# Patient Record
Sex: FEMALE | NOT HISPANIC OR LATINO | URBAN - METROPOLITAN AREA
[De-identification: names, ages, dates, MRNs, and addresses within clinical notes are randomized per-mention and may not be internally consistent; named-entity substitution may affect disease eponyms.]

---

## 2020-02-12 ENCOUNTER — HOSPITAL ENCOUNTER (OUTPATIENT)
Dept: CARDIOLOGY | Facility: HOSPITAL | Age: 85
Discharge: HOME | End: 2020-02-12

## 2020-03-10 ENCOUNTER — HOSPITAL ENCOUNTER (OUTPATIENT)
Dept: CARDIOLOGY | Facility: HOSPITAL | Age: 85
Discharge: HOME | End: 2020-03-10

## 2020-07-29 ENCOUNTER — HOSPITAL ENCOUNTER (OUTPATIENT)
Dept: WOUND CARE | Facility: HOSPITAL | Age: 85
Setting detail: RECURRING SERIES
Discharge: STILL ADMITTED OR ADMINISTRATIVE | End: 2020-10-27
Attending: INTERNAL MEDICINE

## 2020-08-14 LAB — BACTERIA SPEC AEROBE CULT: NORMAL

## 2023-08-11 ENCOUNTER — APPOINTMENT (OUTPATIENT)
Dept: CT IMAGING | Facility: HOSPITAL | Age: 88
DRG: 683 | End: 2023-08-11
Payer: MEDICARE

## 2023-08-11 ENCOUNTER — APPOINTMENT (OUTPATIENT)
Dept: GENERAL RADIOLOGY | Facility: HOSPITAL | Age: 88
DRG: 683 | End: 2023-08-11
Payer: MEDICARE

## 2023-08-11 ENCOUNTER — HOSPITAL ENCOUNTER (INPATIENT)
Facility: HOSPITAL | Age: 88
LOS: 12 days | Discharge: NURSING, REHAB OR LONG-TERM CARE | DRG: 683 | End: 2023-08-24
Attending: EMERGENCY MEDICINE | Admitting: INTERNAL MEDICINE
Payer: MEDICARE

## 2023-08-11 DIAGNOSIS — I71.40 ABDOMINAL AORTIC ANEURYSM (AAA) WITHOUT RUPTURE, UNSPECIFIED PART: ICD-10-CM

## 2023-08-11 DIAGNOSIS — N17.9 AKI (ACUTE KIDNEY INJURY): ICD-10-CM

## 2023-08-11 DIAGNOSIS — R26.2 DIFFICULTY IN WALKING: ICD-10-CM

## 2023-08-11 DIAGNOSIS — Z78.9 DECREASED ACTIVITIES OF DAILY LIVING (ADL): ICD-10-CM

## 2023-08-11 DIAGNOSIS — E86.0 DEHYDRATION: ICD-10-CM
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
## 2023-08-11 LAB
ALBUMIN SERPL-MCNC: 3.6 G/DL (ref 3.5–5.2)
ALBUMIN/GLOB SERPL: 1.2 G/DL
ALP SERPL-CCNC: 101 U/L (ref 39–117)
ALT SERPL W P-5'-P-CCNC: 21 U/L (ref 1–33)
ANION GAP SERPL CALCULATED.3IONS-SCNC: 12.9 MMOL/L (ref 5–15)
AST SERPL-CCNC: 20 U/L (ref 1–32)
BACTERIA UR QL AUTO: ABNORMAL /HPF
BASOPHILS # BLD AUTO: 0.03 10*3/MM3 (ref 0–0.2)
BASOPHILS NFR BLD AUTO: 0.2 % (ref 0–1.5)
BILIRUB SERPL-MCNC: 0.9 MG/DL (ref 0–1.2)
BILIRUB UR QL STRIP: NEGATIVE
BUN SERPL-MCNC: 59 MG/DL (ref 8–23)
BUN/CREAT SERPL: 26.7 (ref 7–25)
CALCIUM SPEC-SCNC: 9.6 MG/DL (ref 8.2–9.6)
CHLORIDE SERPL-SCNC: 106 MMOL/L (ref 98–107)
CK SERPL-CCNC: 101 U/L (ref 20–180)
CLARITY UR: CLEAR
CO2 SERPL-SCNC: 17.1 MMOL/L (ref 22–29)
COLOR UR: YELLOW
CREAT SERPL-MCNC: 2.21 MG/DL (ref 0.57–1)
DEPRECATED RDW RBC AUTO: 53.8 FL (ref 37–54)
EGFRCR SERPLBLD CKD-EPI 2021: 20.3 ML/MIN/1.73
EOSINOPHIL # BLD AUTO: 0.01 10*3/MM3 (ref 0–0.4)
EOSINOPHIL NFR BLD AUTO: 0.1 % (ref 0.3–6.2)
ERYTHROCYTE [DISTWIDTH] IN BLOOD BY AUTOMATED COUNT: 18 % (ref 12.3–15.4)
GLOBULIN UR ELPH-MCNC: 3 GM/DL
GLUCOSE SERPL-MCNC: 148 MG/DL (ref 65–99)
GLUCOSE UR STRIP-MCNC: NEGATIVE MG/DL
HCT VFR BLD AUTO: 39 % (ref 34–46.6)
HGB BLD-MCNC: 13.2 G/DL (ref 12–15.9)
HGB UR QL STRIP.AUTO: NEGATIVE
HOLD SPECIMEN: NORMAL
HOLD SPECIMEN: NORMAL
HYALINE CASTS UR QL AUTO: ABNORMAL /LPF
IMM GRANULOCYTES # BLD AUTO: 0.11 10*3/MM3 (ref 0–0.05)
IMM GRANULOCYTES NFR BLD AUTO: 0.8 % (ref 0–0.5)
KETONES UR QL STRIP: ABNORMAL
LEUKOCYTE ESTERASE UR QL STRIP.AUTO: NEGATIVE
LYMPHOCYTES # BLD AUTO: 0.89 10*3/MM3 (ref 0.7–3.1)
LYMPHOCYTES NFR BLD AUTO: 6.3 % (ref 19.6–45.3)
MAGNESIUM SERPL-MCNC: 2.7 MG/DL (ref 1.7–2.3)
MCH RBC QN AUTO: 31.1 PG (ref 26.6–33)
MCHC RBC AUTO-ENTMCNC: 33.8 G/DL (ref 31.5–35.7)
MCV RBC AUTO: 91.8 FL (ref 79–97)
MONOCYTES # BLD AUTO: 1.35 10*3/MM3 (ref 0.1–0.9)
MONOCYTES NFR BLD AUTO: 9.5 % (ref 5–12)
NEUTROPHILS NFR BLD AUTO: 11.78 10*3/MM3 (ref 1.7–7)
NEUTROPHILS NFR BLD AUTO: 83.1 % (ref 42.7–76)
NITRITE UR QL STRIP: NEGATIVE
NRBC BLD AUTO-RTO: 0 /100 WBC (ref 0–0.2)
PH UR STRIP.AUTO: 5.5 [PH] (ref 5–8)
PLATELET # BLD AUTO: 238 10*3/MM3 (ref 140–450)
PMV BLD AUTO: 9 FL (ref 6–12)
POTASSIUM SERPL-SCNC: 3.5 MMOL/L (ref 3.5–5.2)
PROT SERPL-MCNC: 6.6 G/DL (ref 6–8.5)
PROT UR QL STRIP: ABNORMAL
RBC # BLD AUTO: 4.25 10*6/MM3 (ref 3.77–5.28)
RBC # UR STRIP: ABNORMAL /HPF
REF LAB TEST METHOD: ABNORMAL
SODIUM SERPL-SCNC: 136 MMOL/L (ref 136–145)
SP GR UR STRIP: 1.02 (ref 1–1.03)
SQUAMOUS #/AREA URNS HPF: ABNORMAL /HPF
TROPONIN T SERPL HS-MCNC: 63 NG/L
UROBILINOGEN UR QL STRIP: ABNORMAL
WBC # UR STRIP: ABNORMAL /HPF
WBC NRBC COR # BLD: 14.17 10*3/MM3 (ref 3.4–10.8)
WHOLE BLOOD HOLD COAG: NORMAL
WHOLE BLOOD HOLD SPECIMEN: NORMAL

## 2023-08-11 PROCEDURE — 84100 ASSAY OF PHOSPHORUS: CPT | Performed by: STUDENT IN AN ORGANIZED HEALTH CARE EDUCATION/TRAINING PROGRAM

## 2023-08-11 PROCEDURE — G0378 HOSPITAL OBSERVATION PER HR: HCPCS

## 2023-08-11 PROCEDURE — 83735 ASSAY OF MAGNESIUM: CPT | Performed by: EMERGENCY MEDICINE

## 2023-08-11 PROCEDURE — 74176 CT ABD & PELVIS W/O CONTRAST: CPT

## 2023-08-11 PROCEDURE — 85025 COMPLETE CBC W/AUTO DIFF WBC: CPT | Performed by: EMERGENCY MEDICINE

## 2023-08-11 PROCEDURE — 72100 X-RAY EXAM L-S SPINE 2/3 VWS: CPT

## 2023-08-11 PROCEDURE — 84439 ASSAY OF FREE THYROXINE: CPT | Performed by: STUDENT IN AN ORGANIZED HEALTH CARE EDUCATION/TRAINING PROGRAM

## 2023-08-11 PROCEDURE — 99223 1ST HOSP IP/OBS HIGH 75: CPT | Performed by: STUDENT IN AN ORGANIZED HEALTH CARE EDUCATION/TRAINING PROGRAM

## 2023-08-11 PROCEDURE — 84443 ASSAY THYROID STIM HORMONE: CPT | Performed by: STUDENT IN AN ORGANIZED HEALTH CARE EDUCATION/TRAINING PROGRAM

## 2023-08-11 PROCEDURE — 70450 CT HEAD/BRAIN W/O DYE: CPT

## 2023-08-11 PROCEDURE — 82550 ASSAY OF CK (CPK): CPT | Performed by: STUDENT IN AN ORGANIZED HEALTH CARE EDUCATION/TRAINING PROGRAM

## 2023-08-11 PROCEDURE — 93005 ELECTROCARDIOGRAM TRACING: CPT | Performed by: EMERGENCY MEDICINE

## 2023-08-11 PROCEDURE — 99285 EMERGENCY DEPT VISIT HI MDM: CPT

## 2023-08-11 PROCEDURE — 93005 ELECTROCARDIOGRAM TRACING: CPT

## 2023-08-11 PROCEDURE — 80053 COMPREHEN METABOLIC PANEL: CPT | Performed by: EMERGENCY MEDICINE

## 2023-08-11 PROCEDURE — 84484 ASSAY OF TROPONIN QUANT: CPT | Performed by: EMERGENCY MEDICINE

## 2023-08-11 PROCEDURE — 71045 X-RAY EXAM CHEST 1 VIEW: CPT

## 2023-08-11 PROCEDURE — 81001 URINALYSIS AUTO W/SCOPE: CPT | Performed by: EMERGENCY MEDICINE

## 2023-08-11 RX ORDER — MONTELUKAST SODIUM 10 MG/1
10 TABLET ORAL NIGHTLY
COMMUNITY
Start: 2023-05-01

## 2023-08-11 RX ORDER — LEVOTHYROXINE SODIUM 75 MCG
75 TABLET ORAL DAILY
COMMUNITY
Start: 2023-05-01

## 2023-08-11 RX ORDER — POTASSIUM CHLORIDE 20 MEQ/1
20 TABLET, EXTENDED RELEASE ORAL DAILY
COMMUNITY
Start: 2023-05-01

## 2023-08-11 RX ORDER — SIMVASTATIN 20 MG
20 TABLET ORAL NIGHTLY
COMMUNITY
Start: 2023-05-01

## 2023-08-11 RX ORDER — SODIUM CHLORIDE 0.9 % (FLUSH) 0.9 %
10 SYRINGE (ML) INJECTION AS NEEDED
Status: DISCONTINUED | OUTPATIENT
Start: 2023-08-11 | End: 2023-08-24 | Stop reason: HOSPADM

## 2023-08-11 RX ORDER — FUROSEMIDE 40 MG/1
40 TABLET ORAL DAILY
COMMUNITY
Start: 2023-05-01

## 2023-08-11 RX ORDER — ATENOLOL 50 MG/1
50 TABLET ORAL DAILY
COMMUNITY
Start: 2023-05-01

## 2023-08-11 RX ADMIN — SODIUM CHLORIDE, POTASSIUM CHLORIDE, SODIUM LACTATE AND CALCIUM CHLORIDE 1000 ML: 600; 310; 30; 20 INJECTION, SOLUTION INTRAVENOUS at 22:31

## 2023-08-11 RX ADMIN — SODIUM CHLORIDE 500 ML: 9 INJECTION, SOLUTION INTRAVENOUS at 18:24

## 2023-08-11 RX ADMIN — Medication 10 ML: at 18:25

## 2023-08-11 RX ADMIN — SODIUM CHLORIDE 500 ML: 9 INJECTION, SOLUTION INTRAVENOUS at 19:50

## 2023-08-11 NOTE — ED PROVIDER NOTES
Time: 6:05 PM EDT  Date of encounter:  8/11/2023  Independent Historian/Clinical History and Information was obtained by:   Patient and friend    History is limited by: N/A    Chief Complaint   Patient presents with    Fall     Patient lived by self and neighbor/friend found her in floor         History of Present Illness:  Patient is a 89 y.o. year old female who presents to the emergency department for evaluation of low back pain.  She was found on the floor by a friend who checks on her periodically.  The patient states that she does not see a doctor and has not seen a doctor in years.  She lives by herself and she has no next of kin.  The patient's previous medical notes from [facility] suggest that she has a history of diabetes and COPD.    [facility]    Patient Care Team  Primary Care Provider: Provider, No Known    Past Medical History:     No Known Allergies  Past Medical History:   Diagnosis Date    Diabetes mellitus     Disease of thyroid gland     Hyperlipidemia     Hypertension     Renal disorder      History reviewed. No pertinent surgical history.  History reviewed. No pertinent family history.    Home Medications:  Prior to Admission medications    Not on File        Social History:   Social History     Tobacco Use    Smoking status: Never    Smokeless tobacco: Never   Vaping Use    Vaping Use: Never used   Substance Use Topics    Alcohol use: Never    Drug use: Never         Review of Systems:  Review of Systems   Constitutional:  Positive for activity change, appetite change and fatigue. Negative for chills and fever.   HENT:  Negative for congestion, ear pain and sore throat.    Eyes:  Negative for pain.   Respiratory:  Negative for cough, chest tightness and shortness of breath.    Cardiovascular:  Negative for chest pain.   Gastrointestinal:  Negative for abdominal pain, diarrhea, nausea and vomiting.   Genitourinary:  Negative for flank pain and hematuria.   Musculoskeletal:  Positive for back  "pain. Negative for joint swelling.   Skin:  Negative for pallor.   Neurological:  Positive for weakness. Negative for seizures and headaches.   Hematological: Negative.    Psychiatric/Behavioral: Negative.     All other systems reviewed and are negative.     Physical Exam:  /80 (BP Location: Right arm, Patient Position: Sitting)   Pulse 86   Temp 97.7 øF (36.5 øC) (Oral)   Resp 16   Ht 165.1 cm (65\")   Wt 75.6 kg (166 lb 10.7 oz)   SpO2 97%   BMI 27.73 kg/mý         Physical Exam  Vitals and nursing note reviewed.   Constitutional:       General: She is not in acute distress.     Appearance: Normal appearance. She is not toxic-appearing.   HENT:      Head: Normocephalic and atraumatic.      Mouth/Throat:      Mouth: Mucous membranes are moist.   Eyes:      General: No scleral icterus.  Cardiovascular:      Rate and Rhythm: Normal rate and regular rhythm.      Pulses: Normal pulses.      Heart sounds: Normal heart sounds.   Pulmonary:      Effort: Pulmonary effort is normal. No respiratory distress.      Breath sounds: Normal breath sounds.   Abdominal:      General: Abdomen is flat.      Palpations: Abdomen is soft.      Tenderness: There is no abdominal tenderness.   Musculoskeletal:         General: Normal range of motion.      Cervical back: Normal range of motion and neck supple.   Skin:     General: Skin is warm and dry.   Neurological:      General: No focal deficit present.      Mental Status: She is alert and oriented to person, place, and time. Mental status is at baseline.      Motor: Weakness present.   Psychiatric:         Mood and Affect: Mood normal.         Behavior: Behavior normal.         Thought Content: Thought content normal.         Judgment: Judgment normal.                    Procedures:  Procedures      Medical Decision Making:      Comorbidities that affect care:    COPD    External Notes reviewed:    Previous Clinic Note: Clinic note for COPD and diabetes.      The following " orders were placed and all results were independently analyzed by me:  Orders Placed This Encounter   Procedures    XR Chest 1 View    CT Head Without Contrast    XR Spine Lumbar 2 or 3 View    CT Abdomen Pelvis Without Contrast    M[place]ou Draw    Comprehensive Metabolic Panel    Single High Sensitivity Troponin T    Magnesium    Urinalysis With Microscopic If Indicated (No Culture) - Urine, Clean Catch    CBC Auto Differential    High Sensitivity Troponin T 2Hr    Urinalysis, Microscopic Only - Urine, Clean Catch    CK    CBC Auto Differential    Comprehensive Metabolic Panel    Magnesium    Phosphorus    Phosphorus    TSH Rfx On Abnormal To Free T4    T4, Free    Basic Metabolic Panel    CBC (No Diff)    Magnesium    Diet: Regular/House Diet; Texture: Regular Texture (IDDSI 7); Fluid Consistency: Thin (IDDSI 0)    Undress & Gown    Continuous Pulse Oximetry    Vital Signs    Orthostatic Blood Pressure    Vital Signs    Intake & Output    Weigh Patient    Oral Care    Saline Lock & Maintain IV Access    Apply Warming Horton    Code Status and Medical Interventions:    IP General Consult (Use specialty-specific consult if known)    Hospitalist (on-call MD unless specified)    Inpatient Vascular Surgery Consult    Inpatient Consult to Advance Care Planning    Inpatient Case Management  Consult    Inpatient Palliative Care Team Consult    OT Consult: Eval & Treat    PT Consult: Eval & Treat Functional Mobility Below Baseline    Oxygen Therapy- Nasal Cannula; Titrate 1-6 LPM Per SpO2; 90 - 95%    POC Glucose 4x Daily Before Meals & at Bedtime    POC Glucose Once    POC Glucose Once    POC Glucose Once    POC Glucose Once    ECG 12 Lead ED Triage Standing Order; Weak / Dizzy / AMS    Consult to Wound / Ostomy Care    Insert Peripheral IV    Insert Peripheral IV    Initiate Observation Status    Inpatient Admission    Transfer Patient    Fall Precautions    Fall Precautions    CBC & Differential    Green  Top (Gel)    Lavender Top    Gold Top - SST    Light Blue Top       Medications Given in the Emergency Department:  Medications   sodium chloride 0.9 % flush 10 mL (10 mL Intravenous Given 8/11/23 1825)   sodium chloride 0.9 % flush 10 mL (10 mL Intravenous Not Given 8/13/23 1032)   sodium chloride 0.9 % flush 10 mL (has no administration in time range)   sodium chloride 0.9 % infusion 40 mL (has no administration in time range)   sennosides-docusate (PERICOLACE) 8.6-50 MG per tablet 2 tablet (2 tablets Oral Not Given 8/13/23 1032)     And   polyethylene glycol (MIRALAX) packet 17 g (has no administration in time range)     And   bisacodyl (DULCOLAX) EC tablet 5 mg (has no administration in time range)     And   bisacodyl (DULCOLAX) suppository 10 mg (has no administration in time range)   lactated ringers infusion (100 mL/hr Intravenous New Bag 8/12/23 0612)   dextrose (GLUTOSE) oral gel 15 g (has no administration in time range)   dextrose (D50W) (25 g/50 mL) IV injection 25 g (has no administration in time range)   glucagon (GLUCAGEN) injection 1 mg (has no administration in time range)   Insulin Lispro (humaLOG) injection 2-7 Units (2 Units Subcutaneous Not Given 8/13/23 1228)   potassium chloride 10 mEq in 100 mL IVPB (10 mEq Intravenous Not Given 8/12/23 0817)   levothyroxine (SYNTHROID, LEVOTHROID) tablet 100 mcg (100 mcg Oral Given 8/13/23 0606)   nystatin (MYCOSTATIN) powder ( Topical Given 8/13/23 0839)   acetaminophen (TYLENOL) tablet 650 mg (650 mg Oral Given 8/13/23 0413)   famotidine (PEPCID) tablet 10 mg (10 mg Oral Given 8/13/23 1040)   sodium chloride 0.9 % bolus 500 mL (0 mL Intravenous Stopped 8/11/23 2229)   sodium chloride 0.9 % bolus 500 mL (0 mL Intravenous Stopped 8/12/23 0027)   lactated ringers bolus 1,000 mL (1,000 mL Intravenous New Bag 8/11/23 2231)   potassium chloride (MICRO-K) CR capsule 40 mEq (40 mEq Oral Given 8/12/23 7761)   potassium chloride 10 mEq in 100 mL IVPB (0 mEq  Intravenous Stopped 8/12/23 1251)   potassium chloride (MICRO-K) CR capsule 40 mEq (40 mEq Oral Given 8/13/23 1040)        ED Course:    The patient was initially evaluated in the triage area where orders were placed. The patient was later dispositioned by Elliot Moya DO.      The patient was advised to stay for completion of workup which includes but is not limited to communication of labs and radiological results, reassessment and plan. The patient was advised that leaving prior to disposition by a provider could result in critical findings that are not communicated to the patient.            EKG: Sinus rhythm with a rate of 83 bpm normal P wave and DC interval  Normal QRS with left axis deviation  Nonspecific ST changes.    Labs:    Lab Results (last 24 hours)       Procedure Component Value Units Date/Time    POC Glucose Once [[id number]]  (Abnormal) Collected: 08/12/23 1659    Specimen: Blood Updated: 08/12/23 1702     Glucose 110 mg/dL      Comment: Serial Number: [id number]:  [id number]       POC Glucose Once [[id number]]  (Abnormal) Collected: 08/12/23 2016    Specimen: Blood Updated: 08/12/23 2017     Glucose 126 mg/dL      Comment: Serial Number: [id number]:  [id number]       POC Glucose Once [[id number]]  (Abnormal) Collected: 08/13/23 0751    Specimen: Blood Updated: 08/13/23 0805     Glucose 103 mg/dL      Comment: Serial Number: [id number]:  [id number]       Basic Metabolic Panel [[id number]]  (Abnormal) Collected: 08/13/23 1101    Specimen: Blood Updated: 08/13/23 1242     Glucose 134 mg/dL      BUN 48 mg/dL      Creatinine 1.82 mg/dL      Sodium 135 mmol/L      Potassium 3.7 mmol/L      Chloride 107 mmol/L      CO2 15.2 mmol/L      Calcium 9.4 mg/dL      BUN/Creatinine Ratio 26.4     Anion Gap 12.8 mmol/L      eGFR 25.7 mL/min/1.73     Narrative:      GFR Normal >60  Chronic Kidney Disease <60  Kidney Failure <15    The GFR formula is only valid for adults with stable renal  function between ages 18 and 70.    CBC (No Diff) [[id number]]  (Abnormal) Collected: 08/13/23 1101    Specimen: Blood Updated: 08/13/23 1115     WBC 17.64 10*3/mm3      RBC 3.71 10*6/mm3      Hemoglobin 11.5 g/dL      Hematocrit 34.6 %      MCV 93.3 fL      MCH 31.0 pg      MCHC 33.2 g/dL      RDW 19.1 %      RDW-SD 55.9 fl      MPV 9.0 fL      Platelets 221 10*3/mm3     Magnesium [[id number]]  (Normal) Collected: 08/13/23 1101    Specimen: Blood Updated: 08/13/23 1242     Magnesium 2.3 mg/dL     POC Glucose Once [[id number]]  (Abnormal) Collected: 08/13/23 1134    Specimen: Blood Updated: 08/13/23 1147     Glucose 105 mg/dL      Comment: Serial Number: [id number]:  [id number]                Imaging:    CT Abdomen Pelvis Without Contrast    Result Date: 8/11/2023  Narrative: PROCEDURE: CT ABDOMEN PELVIS WO CONTRAST  COMPARISON: 8/11/2023.  INDICATIONS: Aneurysm on x-ray with back pain; generalized lower back pain.  TECHNIQUE: 728 CT images were created without intravenous contrast.   PROTOCOL:   Standard CT imaging protocol performed.    RADIATION:   Total DLP: 599.5 mGy*cm.   Automated exposure control was utilized to minimize radiation dose.  FINDINGS: There is a large (7.6 cm) infrarenal abdominal aortic aneurysm, probably fusiform in configuration.  The aneurysm neck is roughly estimated at 3 cm, as on image 106 of series 202.  The fusiform aneurysm extends is about 9.4 cm caudally to just above the aortic bifurcation.  No acute retroperitoneal or intraperitoneal hemorrhage is seen to suggest aneurysm leakage or rupture.  There may be mild fusiform dilatation of the bilateral common iliac arteries.  The left common iliac artery measures about 1.6 cm in greatest diameter.  The right common iliac artery measures about 1.5 cm in greatest diameter.  The proximal left internal iliac artery measures about 1.4 cm in greatest diameter.  The proximal right internal iliac artery measures about 1.1 cm in greatest  diameter.  Extensive atherosclerotic changes are seen.  For instance, extensive arterial calcification involves the proximal bilateral renal arteries.  Minimal calcified plaque is seen at the origins of the celiac trunk and superior mesenteric artery.  The inferior mesenteric artery is not well evaluated by this study; it appears to arise from the lower left anterior margin of the large fusiform aneurysm.   Age-indeterminate vertebral compression fractures are seen at T11 and L1, estimated at 30 percent or less in degree.  Vacuum phenomenon involves the disc interspaces at T11-12, T12-L1, L1-2, L2-3, L3-4, and L4-5.  Severe degenerative changes are seen throughout the imaged spine.  There is suspected levoscoliosis of the lumbar spine.  Chronic malalignment (at several levels) is seen throughout the lumbar spine.   Additionally, colonic diverticulosis is seen without acute diverticulitis.  No acute appendicitis.  No mechanical bowel obstruction.  No pneumoperitoneum or pneumatosis.  The patient has undergone cholecystectomy.  No acute pancreatitis.  Small to moderate sized hiatal hernia is seen.  There are coronary artery calcifications.  No cardiac enlargement.  A trace amount of pericardial effusion is seen.  There may be tiny bilateral pleural effusions.  Motion artifact obscures detail.  Mild prominence of the adrenal glands is seen, which is nonspecific.  No definite hydronephrosis, nephrolithiasis, or ureterolithiasis.  There are arterial calcifications in close proximity to the distal ureters.  It appears that the patient has undergone hysterectomy.  Degenerative changes involve the bilateral hip joints and the bilateral sacroiliac joints.  There is mild distension of the urinary bladder.      Impression:    1. There is a 7.6 cm fusiform aneurysm of the infrarenal abdominal aorta, as discussed.  No acute retroperitoneal hemorrhage is seen to suggest aneurysm leakage or rupture.   2. Age-indeterminate (but  possibly subacute) vertebral compression fractures are seen at T11 and L1, estimated at 30 percent or less in degree.  There is minimal if any posterior wall retropulsion associated with these findings.   3. There are small bilateral pleural effusions.   4. The patient has undergone hysterectomy.   5. Probably no significant acute findings are seen otherwise.   6. Please see above comments for further detail.     A preliminary report (regarding the abdominal aortic aneurysm, AAA) was given to Dr. Moya (ordering/attending [facility] ED Physician) at approximately 2205 hours on 8/11/2023.   Please note that portions of this note were completed with a voice recognition program.  VALERIA FRIEDMAN JR, MD       Electronically Signed and Approved By: VALERIA FRIEDMAN JR, MD on 8/11/2023 at 22:18              XR Spine Lumbar 2 or 3 View    Result Date: 8/11/2023  Narrative: PROCEDURE: XR SPINE LUMBAR 2 OR 3 VW  COMPARISON: [facility], MR, LUMBAR SPINE WITHOUT, 3/02/2010, 17:47.  INDICATIONS: GENERALIZED LOWER BACK PAIN AFTER FALL  FINDINGS:  Mild anterior wedging compression fracture of L1 is of indeterminate age.  This is not evident on 3/2/2010.  Vertebral body heights are otherwise well maintained.  Bony structures have an osteopenic appearance consistent with osteoporosis.  Disc spaces are maintained.  Moderate facet arthropathy is seen at L3-4.  Marked facet arthropathy is seen at L4-5.  A few mm of subluxation of L4 anteriorly over L5 is probably on a degenerative basis, and is unchanged in comparison to 3/2/2010.  Marked facet arthropathy is seen at L5-S1.  Left paravertebral mass measures 11 cm in greatest craniocaudal dimension, and 8 cm in greatest transverse dimension.  Curvilinear calcifications are evident.  This may represent a large abdominal aortic aneurysm, not evident on the prior MR.      Impression:   Lumbar spine series demonstrating mild anterior wedging compression fracture of L1 of  indeterminate age.  Large left paravertebral mass with peripheral curvilinear calcifications may represent an abdominal aortic aneurysm.     HERNANDEZ TILLEY MD       Electronically Signed and Approved By: HERNANDEZ TILLEY MD on 8/11/2023 at 18:53             CT Head Without Contrast    Result Date: 8/11/2023  Narrative: PROCEDURE: CT HEAD WO CONTRAST  COMPARISON:  None  INDICATIONS: HEAD TRAUMA POST FALL X TODAY, LOC  PROTOCOL:   Standard imaging protocol performed    RADIATION:   DLP: 954.9 mGy*cm   MA and/or KV was adjusted to minimize radiation dose.    TECHNIQUE: CT images were obtained without non-ionic intravenous contrast material.  FINDINGS:  The ventricles, sulci, and cerebellar folia are mildly and diffusely prominent consistent with atrophy.  Ill-defined diminished density in cerebral white matter is consistent with mild gliosis and/or scattered old lacunar infarcts.  There is no CT evidence of acute intracranial hemorrhage, mass, or mass effect.  The orbits have a normal appearance.  The paranasal sinuses, middle ears, and mastoid air cells are well aerated.  No fractures are seen on bone window images.      Impression:   CT scan of the head without IV contrast demonstrating mild atrophy and white matter changes.  No acute intracranial abnormality is seen.     HERNANDEZ TILLEY MD       Electronically Signed and Approved By: HERNANDEZ TILLEY MD on 8/11/2023 at 19:35             XR Chest 1 View    Result Date: 8/11/2023  Narrative: PROCEDURE: XR CHEST 1 VW  COMPARISON: [facility], CR, CHEST PA/AP & LAT 2V, 11/06/2010, 21:59.  INDICATIONS: Weak/Dizzy/AMS triage protocol/GENERALIZED WEAKNESS, FALL  FINDINGS:  The heart is normal in size.  The lungs are well-expanded and free of infiltrates.  Healed fracture of the lateral left 6th rib is evident.      Impression:   No active cardiopulmonary disease is seen.       HERNANDEZ TILLEY MD       Electronically Signed and Approved By: HERNANDEZ TILLEY MD on  8/11/2023 at 18:54                No Radiology Exams Resulted Within Past 24 Hours      Differential Diagnosis and Discussion:      Back Pain: The patient presents with back pain. My differential diagnosis includes but is not limited to acute spinal epidural abscess, acute spinal epidural bleed, cauda equina syndrome, abdominal aortic aneurysm, aortic dissection, kidney stone, pyelonephritis, musculoskeletal back pain, spinal fracture, and osteoarthritis.   Weakness: Based on the patient's history, signs, and symptoms, the diffential diagnosis includes but is not limited to meningitis, stroke, sepsis, subarachnoid hemorrhage, intracranial bleeding, encephalitis, acute uti, dehydration, MS, myasthenia gravis, Guillan Elizabeth, migraine variant, neuromuscular disorders vertigo, electrolyte imbalance, and metabolic disorders.    All labs were reviewed and interpreted by me.  EKG was interpreted by me.    MDM     Amount and/or Complexity of Data Reviewed  Tests in the radiology section of CPTr: reviewed  Tests in the medicine section of CPTr: reviewed  Decide to obtain previous medical records or to obtain history from someone other than the patient: yes             Patient Care Considerations:    CT CHEST: I considered ordering a CT scan of the chest, however the patient has no cardiopulmonary symptoms      Consultants/Shared Management Plan:    Hospitalist: I have discussed the case with hospitalist who agrees to accept the patient for admission.    Social Determinants of Health:    Patient is unable to carry out activities of daily life. Escalation of care is necessary.       Disposition and Care Coordination:    Admit:   Through independent evaluation of the patient's history, physical, and imperical data, the patient meets criteria for observation/admission to the hospital.        Final diagnoses:   Fall, initial encounter   Dehydration   HANNAH (acute kidney injury)   Abdominal aortic aneurysm (AAA) without rupture,  unspecified part        ED Disposition       ED Disposition   Decision to Admit    Condition   --    Comment   Level of Care: Remote Telemetry [26]   Diagnosis: HANNAH (acute kidney injury) [[id number]]                 This medical record created using voice recognition software.             Elliot Moya, DO  08/13/23 [id number]

## 2023-08-12 PROBLEM — T68.XXXA HYPOTHERMIA: Status: ACTIVE | Noted: 2023-08-12

## 2023-08-12 LAB
ALBUMIN SERPL-MCNC: 3.3 G/DL (ref 3.5–5.2)
ALBUMIN/GLOB SERPL: 1.3 G/DL
ALP SERPL-CCNC: 94 U/L (ref 39–117)
ALT SERPL W P-5'-P-CCNC: 18 U/L (ref 1–33)
ANION GAP SERPL CALCULATED.3IONS-SCNC: 13.8 MMOL/L (ref 5–15)
AST SERPL-CCNC: 13 U/L (ref 1–32)
BASOPHILS # BLD AUTO: 0.02 10*3/MM3 (ref 0–0.2)
BASOPHILS NFR BLD AUTO: 0.1 % (ref 0–1.5)
BILIRUB SERPL-MCNC: 1.1 MG/DL (ref 0–1.2)
BUN SERPL-MCNC: 54 MG/DL (ref 8–23)
BUN/CREAT SERPL: 26.1 (ref 7–25)
CALCIUM SPEC-SCNC: 9.5 MG/DL (ref 8.2–9.6)
CHLORIDE SERPL-SCNC: 108 MMOL/L (ref 98–107)
CO2 SERPL-SCNC: 17.2 MMOL/L (ref 22–29)
CREAT SERPL-MCNC: 2.07 MG/DL (ref 0.57–1)
DEPRECATED RDW RBC AUTO: 56.1 FL (ref 37–54)
EGFRCR SERPLBLD CKD-EPI 2021: 22 ML/MIN/1.73
EOSINOPHIL # BLD AUTO: 0.05 10*3/MM3 (ref 0–0.4)
EOSINOPHIL NFR BLD AUTO: 0.3 % (ref 0.3–6.2)
ERYTHROCYTE [DISTWIDTH] IN BLOOD BY AUTOMATED COUNT: 18.5 % (ref 12.3–15.4)
GEN 5 2HR TROPONIN T REFLEX: 62 NG/L
GLOBULIN UR ELPH-MCNC: 2.6 GM/DL
GLUCOSE BLDC GLUCOMTR-MCNC: 110 MG/DL (ref 70–99)
GLUCOSE BLDC GLUCOMTR-MCNC: 126 MG/DL (ref 70–99)
GLUCOSE SERPL-MCNC: 128 MG/DL (ref 65–99)
HCT VFR BLD AUTO: 37.6 % (ref 34–46.6)
HGB BLD-MCNC: 12.7 G/DL (ref 12–15.9)
IMM GRANULOCYTES # BLD AUTO: 0.07 10*3/MM3 (ref 0–0.05)
IMM GRANULOCYTES NFR BLD AUTO: 0.5 % (ref 0–0.5)
LYMPHOCYTES # BLD AUTO: 1.23 10*3/MM3 (ref 0.7–3.1)
LYMPHOCYTES NFR BLD AUTO: 8.2 % (ref 19.6–45.3)
MAGNESIUM SERPL-MCNC: 2.6 MG/DL (ref 1.7–2.3)
MCH RBC QN AUTO: 31.4 PG (ref 26.6–33)
MCHC RBC AUTO-ENTMCNC: 33.8 G/DL (ref 31.5–35.7)
MCV RBC AUTO: 92.8 FL (ref 79–97)
MONOCYTES # BLD AUTO: 1.48 10*3/MM3 (ref 0.1–0.9)
MONOCYTES NFR BLD AUTO: 9.9 % (ref 5–12)
NEUTROPHILS NFR BLD AUTO: 12.14 10*3/MM3 (ref 1.7–7)
NEUTROPHILS NFR BLD AUTO: 81 % (ref 42.7–76)
NRBC BLD AUTO-RTO: 0 /100 WBC (ref 0–0.2)
PHOSPHATE SERPL-MCNC: 2.9 MG/DL (ref 2.5–4.5)
PHOSPHATE SERPL-MCNC: 3 MG/DL (ref 2.5–4.5)
PLATELET # BLD AUTO: 225 10*3/MM3 (ref 140–450)
PMV BLD AUTO: 8.6 FL (ref 6–12)
POTASSIUM SERPL-SCNC: 2.7 MMOL/L (ref 3.5–5.2)
PROT SERPL-MCNC: 5.9 G/DL (ref 6–8.5)
RBC # BLD AUTO: 4.05 10*6/MM3 (ref 3.77–5.28)
SODIUM SERPL-SCNC: 139 MMOL/L (ref 136–145)
T4 FREE SERPL-MCNC: 0.49 NG/DL (ref 0.93–1.7)
TROPONIN T DELTA: -1 NG/L
TSH SERPL DL<=0.05 MIU/L-ACNC: 13.33 UIU/ML (ref 0.27–4.2)
WBC NRBC COR # BLD: 14.99 10*3/MM3 (ref 3.4–10.8)

## 2023-08-12 PROCEDURE — 36415 COLL VENOUS BLD VENIPUNCTURE: CPT | Performed by: STUDENT IN AN ORGANIZED HEALTH CARE EDUCATION/TRAINING PROGRAM

## 2023-08-12 PROCEDURE — 99222 1ST HOSP IP/OBS MODERATE 55: CPT | Performed by: SURGERY

## 2023-08-12 PROCEDURE — 84100 ASSAY OF PHOSPHORUS: CPT | Performed by: STUDENT IN AN ORGANIZED HEALTH CARE EDUCATION/TRAINING PROGRAM

## 2023-08-12 PROCEDURE — 82948 REAGENT STRIP/BLOOD GLUCOSE: CPT

## 2023-08-12 PROCEDURE — 80053 COMPREHEN METABOLIC PANEL: CPT | Performed by: STUDENT IN AN ORGANIZED HEALTH CARE EDUCATION/TRAINING PROGRAM

## 2023-08-12 PROCEDURE — 85025 COMPLETE CBC W/AUTO DIFF WBC: CPT | Performed by: STUDENT IN AN ORGANIZED HEALTH CARE EDUCATION/TRAINING PROGRAM

## 2023-08-12 PROCEDURE — 83735 ASSAY OF MAGNESIUM: CPT | Performed by: STUDENT IN AN ORGANIZED HEALTH CARE EDUCATION/TRAINING PROGRAM

## 2023-08-12 PROCEDURE — 99232 SBSQ HOSP IP/OBS MODERATE 35: CPT | Performed by: INTERNAL MEDICINE

## 2023-08-12 PROCEDURE — 0 POTASSIUM CHLORIDE 10 MEQ/100ML SOLUTION: Performed by: PHYSICIAN ASSISTANT

## 2023-08-12 PROCEDURE — 84484 ASSAY OF TROPONIN QUANT: CPT | Performed by: STUDENT IN AN ORGANIZED HEALTH CARE EDUCATION/TRAINING PROGRAM

## 2023-08-12 RX ORDER — SODIUM CHLORIDE 9 MG/ML
40 INJECTION, SOLUTION INTRAVENOUS AS NEEDED
Status: DISCONTINUED | OUTPATIENT
Start: 2023-08-12 | End: 2023-08-24 | Stop reason: HOSPADM

## 2023-08-12 RX ORDER — BISACODYL 5 MG/1
5 TABLET, DELAYED RELEASE ORAL DAILY PRN
Status: DISCONTINUED | OUTPATIENT
Start: 2023-08-12 | End: 2023-08-24 | Stop reason: HOSPADM

## 2023-08-12 RX ORDER — NYSTATIN 100000 [USP'U]/G
POWDER TOPICAL EVERY 12 HOURS SCHEDULED
Status: DISCONTINUED | OUTPATIENT
Start: 2023-08-12 | End: 2023-08-16

## 2023-08-12 RX ORDER — LEVOTHYROXINE SODIUM 0.1 MG/1
100 TABLET ORAL
Status: DISCONTINUED | OUTPATIENT
Start: 2023-08-13 | End: 2023-08-24 | Stop reason: HOSPADM

## 2023-08-12 RX ORDER — POLYETHYLENE GLYCOL 3350 17 G/17G
17 POWDER, FOR SOLUTION ORAL DAILY PRN
Status: DISCONTINUED | OUTPATIENT
Start: 2023-08-12 | End: 2023-08-24 | Stop reason: HOSPADM

## 2023-08-12 RX ORDER — DEXTROSE MONOHYDRATE 25 G/50ML
25 INJECTION, SOLUTION INTRAVENOUS
Status: DISCONTINUED | OUTPATIENT
Start: 2023-08-12 | End: 2023-08-24 | Stop reason: HOSPADM

## 2023-08-12 RX ORDER — SODIUM CHLORIDE, SODIUM LACTATE, POTASSIUM CHLORIDE, CALCIUM CHLORIDE 600; 310; 30; 20 MG/100ML; MG/100ML; MG/100ML; MG/100ML
100 INJECTION, SOLUTION INTRAVENOUS CONTINUOUS
Status: ACTIVE | OUTPATIENT
Start: 2023-08-12 | End: 2023-08-12

## 2023-08-12 RX ORDER — INSULIN LISPRO 100 [IU]/ML
2-7 INJECTION, SOLUTION INTRAVENOUS; SUBCUTANEOUS
Status: DISCONTINUED | OUTPATIENT
Start: 2023-08-12 | End: 2023-08-24 | Stop reason: HOSPADM

## 2023-08-12 RX ORDER — NICOTINE POLACRILEX 4 MG
15 LOZENGE BUCCAL
Status: DISCONTINUED | OUTPATIENT
Start: 2023-08-12 | End: 2023-08-24 | Stop reason: HOSPADM

## 2023-08-12 RX ORDER — ACETAMINOPHEN 325 MG/1
650 TABLET ORAL EVERY 6 HOURS PRN
Status: DISCONTINUED | OUTPATIENT
Start: 2023-08-12 | End: 2023-08-24 | Stop reason: HOSPADM

## 2023-08-12 RX ORDER — SODIUM CHLORIDE 0.9 % (FLUSH) 0.9 %
10 SYRINGE (ML) INJECTION EVERY 12 HOURS SCHEDULED
Status: DISCONTINUED | OUTPATIENT
Start: 2023-08-12 | End: 2023-08-24 | Stop reason: HOSPADM

## 2023-08-12 RX ORDER — POTASSIUM CHLORIDE 7.45 MG/ML
10 INJECTION INTRAVENOUS
Status: COMPLETED | OUTPATIENT
Start: 2023-08-12 | End: 2023-08-12

## 2023-08-12 RX ORDER — POTASSIUM CHLORIDE 750 MG/1
40 CAPSULE, EXTENDED RELEASE ORAL ONCE
Status: COMPLETED | OUTPATIENT
Start: 2023-08-12 | End: 2023-08-12

## 2023-08-12 RX ORDER — POTASSIUM CHLORIDE 7.45 MG/ML
10 INJECTION INTRAVENOUS
Status: DISPENSED | OUTPATIENT
Start: 2023-08-12 | End: 2023-08-12

## 2023-08-12 RX ORDER — SODIUM CHLORIDE 0.9 % (FLUSH) 0.9 %
10 SYRINGE (ML) INJECTION AS NEEDED
Status: DISCONTINUED | OUTPATIENT
Start: 2023-08-12 | End: 2023-08-24 | Stop reason: HOSPADM

## 2023-08-12 RX ORDER — BISACODYL 10 MG
10 SUPPOSITORY, RECTAL RECTAL DAILY PRN
Status: DISCONTINUED | OUTPATIENT
Start: 2023-08-12 | End: 2023-08-24 | Stop reason: HOSPADM

## 2023-08-12 RX ORDER — FAMOTIDINE 10 MG/ML
20 INJECTION, SOLUTION INTRAVENOUS DAILY
Status: DISCONTINUED | OUTPATIENT
Start: 2023-08-12 | End: 2023-08-13

## 2023-08-12 RX ORDER — AMOXICILLIN 250 MG
2 CAPSULE ORAL 2 TIMES DAILY
Status: DISCONTINUED | OUTPATIENT
Start: 2023-08-12 | End: 2023-08-24 | Stop reason: HOSPADM

## 2023-08-12 RX ADMIN — SODIUM CHLORIDE, POTASSIUM CHLORIDE, SODIUM LACTATE AND CALCIUM CHLORIDE 100 ML/HR: 600; 310; 30; 20 INJECTION, SOLUTION INTRAVENOUS at 01:16

## 2023-08-12 RX ADMIN — SENNOSIDES AND DOCUSATE SODIUM 2 TABLET: 50; 8.6 TABLET ORAL at 20:41

## 2023-08-12 RX ADMIN — NYSTATIN: 100000 POWDER TOPICAL at 13:36

## 2023-08-12 RX ADMIN — POTASSIUM CHLORIDE 10 MEQ: 7.46 INJECTION, SOLUTION INTRAVENOUS at 10:04

## 2023-08-12 RX ADMIN — NYSTATIN: 100000 POWDER TOPICAL at 20:18

## 2023-08-12 RX ADMIN — Medication 10 ML: at 12:52

## 2023-08-12 RX ADMIN — Medication 10 ML: at 01:16

## 2023-08-12 RX ADMIN — Medication 10 ML: at 20:18

## 2023-08-12 RX ADMIN — POTASSIUM CHLORIDE 10 MEQ: 7.46 INJECTION, SOLUTION INTRAVENOUS at 06:21

## 2023-08-12 RX ADMIN — FAMOTIDINE 20 MG: 10 INJECTION INTRAVENOUS at 13:36

## 2023-08-12 RX ADMIN — POTASSIUM CHLORIDE 40 MEQ: 10 CAPSULE, COATED, EXTENDED RELEASE ORAL at 04:17

## 2023-08-12 RX ADMIN — POTASSIUM CHLORIDE 10 MEQ: 7.46 INJECTION, SOLUTION INTRAVENOUS at 08:15

## 2023-08-12 RX ADMIN — SENNOSIDES AND DOCUSATE SODIUM 2 TABLET: 50; 8.6 TABLET ORAL at 09:15

## 2023-08-12 RX ADMIN — SODIUM CHLORIDE, POTASSIUM CHLORIDE, SODIUM LACTATE AND CALCIUM CHLORIDE 100 ML/HR: 600; 310; 30; 20 INJECTION, SOLUTION INTRAVENOUS at 06:12

## 2023-08-12 NOTE — H&P
[facility] HISTORY AND PHYSICAL  Date: 2023   Patient Name: Jayashree Longoria  : 1929  MRN: [id number]  Primary Care Physician:  Provider, No Known  Date of admission: 2023    Subjective   Subjective     Chief Complaint: Fall and found on the floor    HPI:    Jayashree Longoria is a 89 y.o. female with a past medical history of  type 2 diabetes mellitus, hypothyroidism, hyperlipidemia presented to the ED for evaluation of low back pain.  Patient was found to be on the floor by a friend who regularly checks on her.  Patient said that she has fell yesterday while walking.  Unsure why she fell.  Denies any lightheadedness, palpitations prior to the fall.  Unsure if she hit her head or not.  Was not able to get up and was lying on the floor.  Patient states that she has not seen a doctor in the last few years.  Positive for lower back pain.  Denies any fevers, chills, abdominal pain, nausea, vomiting, focal weakness, numbness, tingling.    In the ED, vital signs temperature 97.9, pulse 82, respiratory 18, blood pressure 117/79 on room air saturating around 94%.  Labs showed initial troponin 63, sodium 136, potassium 3.5, bicarb 17.1, creatinine 2.21, 3 years ago was 1.3, magnesium 2.7, rest of the CMP within normal limits, WBC 14.17, normal hemoglobin, normal platelets, urinalysis does not look infected, chest x-ray did not show any acute cardiopulmonary process.  X-ray of the lumbar spine showed compression fraction of the L1 of indeterminate age.  Large left paravertebral mass with peripheral calvarial calcifications may represent an abdominal aortic aneurysm.  CT head without contrast did not show any acute intracranial abnormality.  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  No acute retroperitoneal hemorrhage is seen to suggest aneurysm leakage or rupture.  Vertebral compression fracture at the T11 and L1 is noted.   Small bilateral pleural effusions are noted.  Case has been discussed by the ED physician with vascular surgeon on-call Dr. Vickers, will be evaluating the patient in the morning and discuss regarding the management options for abdominal aortic aneurysm.  Received IV fluids in the ED.  Patient has been admitted for further evaluation and management of abdominal aortic aneurysm, HANNAH, dehydration.      Personal History     Past medical history  Type 2 diabetes mellitus  Hypothyroidism  Hyperlipidemia    Unable to obtain past surgical history, family history as the patient has significant hearing problem and unable to obtain all the information.      No family history on file.      Social History     Socioeconomic History    Marital status:          Home Medications:  atenolol, furosemide, levothyroxine, metFORMIN, montelukast, potassium chloride, and simvastatin    Allergies:  No Known Allergies      Objective   Objective     Vitals:   Temp:  [97.9 øF (36.6 øC)] 97.9 øF (36.6 øC)  Heart Rate:  [76-83] 83  Resp:  [18] 18  BP: (111-117)/() 115/100    Physical Exam    Constitutional: Awake, alert, no acute distress   Eyes: Pupils equal, sclerae anicteric, no conjunctival injection   HENT: NCAT, mucous membranes dry   Neck: Supple, no thyromegaly, no lymphadenopathy, trachea midline   Respiratory: Clear to auscultation bilaterally, nonlabored respirations    Cardiovascular: RRR, no murmurs, rubs, or gallops   Gastrointestinal: Positive bowel sounds, soft, nontender, nondistended   Musculoskeletal: No bilateral ankle edema, no clubbing or cyanosis to extremities   Psychiatric: Appropriate affect, cooperative   Neurologic: Oriented x 3, strength symmetric 5/5 bilateral upper and lower extremities, speech clear   Skin: No rashes     Result Review    Result Review:  I have personally reviewed the results from the time of this admission to 8/11/2023 23:36 EDT and agree with these findings:  [x]  Laboratory  []   Microbiology  [x]  Radiology  [x]  EKG/Telemetry   []  Cardiology/Vascular   []  Pathology  []  Old records  []  Other:        Imaging Results (Last 24 Hours)       Procedure Component Value Units Date/Time    CT Abdomen Pelvis Without Contrast [[id number]] Collected: 08/11/23 2218     Updated: 08/11/23 2221    Narrative:      PROCEDURE: CT ABDOMEN PELVIS WO CONTRAST     COMPARISON: 8/11/2023.     INDICATIONS: Aneurysm on x-ray with back pain; generalized lower back pain.     TECHNIQUE: 728 CT images were created without intravenous contrast.       PROTOCOL:   Standard CT imaging protocol performed.      RADIATION:   Total DLP: 599.5 mGy*cm.    Automated exposure control was utilized to minimize radiation dose.      FINDINGS: There is a large (7.6 cm) infrarenal abdominal aortic aneurysm, probably fusiform in   configuration.  The aneurysm neck is roughly estimated at 3 cm, as on image 106 of series 202.  The   fusiform aneurysm extends is about 9.4 cm caudally to just above the aortic bifurcation.  No acute   retroperitoneal or intraperitoneal hemorrhage is seen to suggest aneurysm leakage or rupture.    There may be mild fusiform dilatation of the bilateral common iliac arteries.  The left common   iliac artery measures about 1.6 cm in greatest diameter.  The right common iliac artery measures   about 1.5 cm in greatest diameter.  The proximal left internal iliac artery measures about 1.4 cm   in greatest diameter.  The proximal right internal iliac artery measures about 1.1 cm in greatest   diameter.  Extensive atherosclerotic changes are seen.  For instance, extensive arterial   calcification involves the proximal bilateral renal arteries.  Minimal calcified plaque is seen at   the origins of the celiac trunk and superior mesenteric artery.  The inferior mesenteric artery is   not well evaluated by this study; it appears to arise from the lower left anterior margin of the   large fusiform aneurysm.        Age-indeterminate vertebral compression fractures are seen at T11 and L1, estimated at 30 percent   or less in degree.  Vacuum phenomenon involves the disc interspaces at T11-12, T12-L1, L1-2, L2-3,   L3-4, and L4-5.  Severe degenerative changes are seen throughout the imaged spine.  There is   suspected levoscoliosis of the lumbar spine.  Chronic malalignment (at several levels) is seen   throughout the lumbar spine.       Additionally, colonic diverticulosis is seen without acute diverticulitis.  No acute appendicitis.    No mechanical bowel obstruction.  No pneumoperitoneum or pneumatosis.  The patient has undergone   cholecystectomy.  No acute pancreatitis.  Small to moderate sized hiatal hernia is seen.  There are   coronary artery calcifications.  No cardiac enlargement.  A trace amount of pericardial effusion is   seen.  There may be tiny bilateral pleural effusions.  Motion artifact obscures detail.  Mild   prominence of the adrenal glands is seen, which is nonspecific.  No definite hydronephrosis,   nephrolithiasis, or ureterolithiasis.  There are arterial calcifications in close proximity to the   distal ureters.  It appears that the patient has undergone hysterectomy.  Degenerative changes   involve the bilateral hip joints and the bilateral sacroiliac joints.  There is mild distension of   the urinary bladder.       Impression:            1. There is a 7.6 cm fusiform aneurysm of the infrarenal abdominal aorta, as discussed.  No acute   retroperitoneal hemorrhage is seen to suggest aneurysm leakage or rupture.       2. Age-indeterminate (but possibly subacute) vertebral compression fractures are seen at T11 and   L1, estimated at 30 percent or less in degree.  There is minimal if any posterior wall retropulsion   associated with these findings.       3. There are small bilateral pleural effusions.       4. The patient has undergone hysterectomy.       5. Probably no significant acute findings are seen  otherwise.       6. Please see above comments for further detail.             A preliminary report (regarding the abdominal aortic aneurysm, AAA) was given to Dr. Moya   (ordering/attending [facility] ED Physician) at approximately 2205 hours on 8/11/2023.       Please note that portions of this note were completed with a voice recognition program.     VALERIA FRIEDMAN JR, MD         Electronically Signed and Approved By: VALERIA FRIEDMAN JR, MD on 8/11/2023 at 22:18                        CT Head Without Contrast [[id number]] Collected: 08/1934     Updated: 08/11/23 1938    Narrative:      PROCEDURE: CT HEAD WO CONTRAST     COMPARISON:  None     INDICATIONS: HEAD TRAUMA POST FALL X TODAY, LOC     PROTOCOL:   Standard imaging protocol performed      RADIATION:   DLP: 954.9 mGy*cm    MA and/or KV was adjusted to minimize radiation dose.       TECHNIQUE: CT images were obtained without non-ionic intravenous contrast material.      FINDINGS:   The ventricles, sulci, and cerebellar folia are mildly and diffusely prominent consistent with   atrophy.     Ill-defined diminished density in cerebral white matter is consistent with mild gliosis and/or   scattered old lacunar infarcts.     There is no CT evidence of acute intracranial hemorrhage, mass, or mass effect.     The orbits have a normal appearance.     The paranasal sinuses, middle ears, and mastoid air cells are well aerated.     No fractures are seen on bone window images.       Impression:         CT scan of the head without IV contrast demonstrating mild atrophy and white matter changes.     No acute intracranial abnormality is seen.            HERNANDEZ TILLEY MD         Electronically Signed and Approved By: HERNANDEZ TILLEY MD on 8/11/2023 at 19:35                     XR Chest 1 View [[id number]] Collected: 08/11/23 1854     Updated: 08/11/23 1857    Narrative:      PROCEDURE: XR CHEST 1 VW     COMPARISON: [facility], CR, CHEST PA/AP & LAT 2V,  11/06/2010, 21:59.     INDICATIONS: Weak/Dizzy/AMS triage protocol/GENERALIZED WEAKNESS, FALL     FINDINGS:   The heart is normal in size.  The lungs are well-expanded and free of infiltrates.     Healed fracture of the lateral left 6th rib is evident.       Impression:         No active cardiopulmonary disease is seen.                  HERNANDEZ TILLEY MD         Electronically Signed and Approved By: HERNANDEZ TILLEY MD on 8/11/2023 at 18:54                     XR Spine Lumbar 2 or 3 View [[id number]] Collected: 08/11/23 1853     Updated: 08/11/23 1856    Narrative:      PROCEDURE: XR SPINE LUMBAR 2 OR 3 VW     COMPARISON: [facility], MR, LUMBAR SPINE WITHOUT, 3/02/2010, 17:47.     INDICATIONS: GENERALIZED LOWER BACK PAIN AFTER FALL     FINDINGS:   Mild anterior wedging compression fracture of L1 is of indeterminate age.  This is not evident on   3/2/2010.     Vertebral body heights are otherwise well maintained.  Bony structures have an osteopenic   appearance consistent with osteoporosis.     Disc spaces are maintained.     Moderate facet arthropathy is seen at L3-4.  Marked facet arthropathy is seen at L4-5.  A few mm of   subluxation of L4 anteriorly over L5 is probably on a degenerative basis, and is unchanged in   comparison to 3/2/2010.     Marked facet arthropathy is seen at L5-S1.     Left paravertebral mass measures 11 cm in greatest craniocaudal dimension, and 8 cm in greatest   transverse dimension.  Curvilinear calcifications are evident.  This may represent a large   abdominal aortic aneurysm, not evident on the prior MR.       Impression:         Lumbar spine series demonstrating mild anterior wedging compression fracture of L1 of indeterminate   age.     Large left paravertebral mass with peripheral curvilinear calcifications may represent an abdominal   aortic aneurysm.            HERNANDEZ TILLEY MD         Electronically Signed and Approved By: HERNANDEZ TILLEY MD on 8/11/2023 at 18:53                                       Assessment & Plan   Assessment / Plan     Assessment/Plan:   Fall  HANNAH  Dehydration  7.6 cm infrarenal abdominal aortic aneurysm  T11 and L1 subacute compression fracture  Type 2 diabetes mellitus  Hypothyroidism  Hyperlipidemia    Plan  Admit to observation, remote telemetry  Received 2 L IV fluids  Continue LR at 100 cc/h for next 12 hours  Monitor urine output  Monitor electrolytes, kidney function with a.m. labs  Case has been discussed by the ED physician with vascular surgeon on-call Dr. Vickers, will be evaluating the patient in the morning to discuss regarding the management options for abdominal aortic aneurysm  Consistent carb diet  Low-dose sliding scale insulin  Fall precautions  PT OT when appropriate      DVT prophylaxis:  No DVT prophylaxis order currently exists.    CODE STATUS:    Level Of Support Discussed With: Patient  Code Status (Patient has no pulse and is not breathing): CPR (Attempt to Resuscitate)  Medical Interventions (Patient has pulse or is breathing): Full Support      Admission Status:  I believe this patient meets observation status.    Part of this note may be an electronic transcription/translation of spoken language to printed text using the Dragon Dictation System    Latanya Estrada MD

## 2023-08-12 NOTE — NURSING NOTE
Received report from nurse on 4nt, pt arrived to floor, via bed by RN, AAOx1,[place], Warming blanket placed on patient- IV inserted 20G lac, LR and 1 of 3 KCL started- see emar.

## 2023-08-12 NOTE — PROGRESS NOTES
[facility]   Hospitalist Progress Note  Date: 2023  Patient Name: Jayashree Longoria  : 1929  MRN: [id number]  Date of admission: 2023      Subjective   Subjective     Chief Complaint: Fall and found down     Summary:   89 y.o. female with a past medical history of  type 2 diabetes mellitus, hypothyroidism, hyperlipidemia presented to the ED for evaluation of low back pain.  Patient was found to be on the floor by a friend who regularly checks on her.  Patient said that she has fell yesterday while walking.  Unsure why she fell.  Denies any lightheadedness, palpitations prior to the fall.  Unsure if she hit her head or not.  Was not able to get up and was lying on the floor.  Patient states that she has not seen a doctor in the last few years.  Positive for lower back pain.  Denies any fevers, chills, abdominal pain, nausea, vomiting, focal weakness, numbness, tingling.     In the ED, vital signs temperature 97.9, pulse 82, respiratory 18, blood pressure 117/79 on room air saturating around 94%.  Labs showed initial troponin 63, sodium 136, potassium 3.5, bicarb 17.1, creatinine 2.21, 3 years ago was 1.3, magnesium 2.7, rest of the CMP within normal limits, WBC 14.17, normal hemoglobin, normal platelets, urinalysis does not look infected, chest x-ray did not show any acute cardiopulmonary process.  X-ray of the lumbar spine showed compression fraction of the L1 of indeterminate age.  Large left paravertebral mass with peripheral calvarial calcifications may represent an abdominal aortic aneurysm.  CT head without contrast did not show any acute intracranial abnormality.  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  No acute retroperitoneal hemorrhage is seen to suggest aneurysm leakage or rupture.  Vertebral compression fracture at the T11 and L1 is noted.  Small bilateral pleural effusions are noted.  Case has been discussed by the ED  physician with vascular surgeon on-call Dr. Vickers, will be evaluating the patient in the morning and discuss regarding the management options for abdominal aortic aneurysm.  Received IV fluids in the ED.  Patient has been admitted for further evaluation and management of abdominal aortic aneurysm, HANNAH, dehydration.       Interval Followup:   Patient is very confused and asking to go home  She denies any specific complaints     Review of Systems   Unable to obtain due to mental status     Objective   Objective     Vitals:   Temp:  [96 øF (35.6 øC)-97.9 øF (36.6 øC)] 96.8 øF (36 øC)  Heart Rate:  [73-84] 77  Resp:  [18] 18  BP: (111-147)/() 125/75  Physical Exam    Constitutional: Awake, alert, no acute distress resting in bed with warmer on    Eyes: Pupils equal, sclerae anicteric, no conjunctival injection   HENT: NCAT, mucous membranes moist   Neck: Supple, no thyromegaly, no lymphadenopathy, trachea midline   Respiratory: Clear to auscultation bilaterally, nonlabored respirations    Cardiovascular: RRR, no murmurs, rubs, or gallops, palpable pedal pulses bilaterally   Gastrointestinal: Positive bowel sounds, soft, nontender, nondistended   Musculoskeletal: No bilateral ankle edema, no clubbing or cyanosis to extremities   Psychiatric: Appropriate affect, cooperative   Neurologic: Oriented x 31 strength symmetric in all extremities, Cranial Nerves grossly intact to confrontation, speech clear   Skin: No rashes     Result Review    Result Review:  I have personally reviewed the results from the time of this admission to 8/12/2023 11:56 EDT and agree with these findings:  [x]  Laboratory  CBC          8/11/2023    18:06 8/12/2023    01:16   CBC   WBC 14.17  14.99    RBC 4.25  4.05    Hemoglobin 13.2  12.7    Hematocrit 39.0  37.6    MCV 91.8  92.8    MCH 31.1  31.4    MCHC 33.8  33.8    RDW 18.0  18.5    Platelets 238  225      BMP          8/11/2023    18:06 8/12/2023    01:16   BMP   BUN 59  54    Creatinine  2.21  2.07    Sodium 136  139    Potassium 3.5  2.7    Chloride 106  108    CO2 17.1  17.2    Calcium 9.6  9.5        [x]  Microbiology  No results found for: ACANTHNAEG, AFBCX, BPERTUSSISCX, BLOODCX  No results found for: BCIDPCR, CXREFLEX, CSFCX, CULTURETIS  No results found for: CULTURES, HSVCX, URCX  No results found for: EYECULTURE, GCCX, HSVCULTURE, LABHSV  No results found for: LEGIONELLA, MRSACX, MUMPSCX, MYCOPLASCX  No results found for: NOCARDIACX, STOOLCX  No results found for: THROATCX, UNSTIMCULT, URINECX, CULTURE, VZVCULTUR  No results found for: VIRALCULTU, WOUNDCX    [x]  Radiology  XR Chest 1 View    Result Date: 8/11/2023  PROCEDURE: XR CHEST 1 VW  COMPARISON: [facility], , CHEST PA/AP & LAT 2V, 11/06/2010, 21:59.  INDICATIONS: Weak/Dizzy/AMS triage protocol/GENERALIZED WEAKNESS, FALL  FINDINGS:  The heart is normal in size.  The lungs are well-expanded and free of infiltrates.  Healed fracture of the lateral left 6th rib is evident.      Impression:   No active cardiopulmonary disease is seen.       HERNANDEZ TILLEY MD       Electronically Signed and Approved By: HERNANDEZ TILLEY MD on 8/11/2023 at 18:54            CT Abdomen Pelvis Without Contrast    Result Date: 8/11/2023  PROCEDURE: CT ABDOMEN PELVIS WO CONTRAST  COMPARISON: 8/11/2023.  INDICATIONS: Aneurysm on x-ray with back pain; generalized lower back pain.  TECHNIQUE: 728 CT images were created without intravenous contrast.   PROTOCOL:   Standard CT imaging protocol performed.    RADIATION:   Total DLP: 599.5 mGy*cm.   Automated exposure control was utilized to minimize radiation dose.  FINDINGS: There is a large (7.6 cm) infrarenal abdominal aortic aneurysm, probably fusiform in configuration.  The aneurysm neck is roughly estimated at 3 cm, as on image 106 of series 202.  The fusiform aneurysm extends is about 9.4 cm caudally to just above the aortic bifurcation.  No acute retroperitoneal or intraperitoneal hemorrhage is  seen to suggest aneurysm leakage or rupture.  There may be mild fusiform dilatation of the bilateral common iliac arteries.  The left common iliac artery measures about 1.6 cm in greatest diameter.  The right common iliac artery measures about 1.5 cm in greatest diameter.  The proximal left internal iliac artery measures about 1.4 cm in greatest diameter.  The proximal right internal iliac artery measures about 1.1 cm in greatest diameter.  Extensive atherosclerotic changes are seen.  For instance, extensive arterial calcification involves the proximal bilateral renal arteries.  Minimal calcified plaque is seen at the origins of the celiac trunk and superior mesenteric artery.  The inferior mesenteric artery is not well evaluated by this study; it appears to arise from the lower left anterior margin of the large fusiform aneurysm.   Age-indeterminate vertebral compression fractures are seen at T11 and L1, estimated at 30 percent or less in degree.  Vacuum phenomenon involves the disc interspaces at T11-12, T12-L1, L1-2, L2-3, L3-4, and L4-5.  Severe degenerative changes are seen throughout the imaged spine.  There is suspected levoscoliosis of the lumbar spine.  Chronic malalignment (at several levels) is seen throughout the lumbar spine.   Additionally, colonic diverticulosis is seen without acute diverticulitis.  No acute appendicitis.  No mechanical bowel obstruction.  No pneumoperitoneum or pneumatosis.  The patient has undergone cholecystectomy.  No acute pancreatitis.  Small to moderate sized hiatal hernia is seen.  There are coronary artery calcifications.  No cardiac enlargement.  A trace amount of pericardial effusion is seen.  There may be tiny bilateral pleural effusions.  Motion artifact obscures detail.  Mild prominence of the adrenal glands is seen, which is nonspecific.  No definite hydronephrosis, nephrolithiasis, or ureterolithiasis.  There are arterial calcifications in close proximity to the  distal ureters.  It appears that the patient has undergone hysterectomy.  Degenerative changes involve the bilateral hip joints and the bilateral sacroiliac joints.  There is mild distension of the urinary bladder.      Impression:    1. There is a 7.6 cm fusiform aneurysm of the infrarenal abdominal aorta, as discussed.  No acute retroperitoneal hemorrhage is seen to suggest aneurysm leakage or rupture.   2. Age-indeterminate (but possibly subacute) vertebral compression fractures are seen at T11 and L1, estimated at 30 percent or less in degree.  There is minimal if any posterior wall retropulsion associated with these findings.   3. There are small bilateral pleural effusions.   4. The patient has undergone hysterectomy.   5. Probably no significant acute findings are seen otherwise.   6. Please see above comments for further detail.     A preliminary report (regarding the abdominal aortic aneurysm, AAA) was given to Dr. Moya (ordering/attending [facility] ED Physician) at approximately 2205 hours on 8/11/2023.   Please note that portions of this note were completed with a voice recognition program.  VALERIA FRIEDMAN JR, MD       Electronically Signed and Approved By: VALERIA FRIEDMAN JR, MD on 8/11/2023 at 22:18                 []  EKG/Telemetry   []  Cardiology/Vascular   []  Pathology  []  Old records  []  Other:    Assessment & Plan   Assessment / Plan     Assessment/Plan:  DM Type 2  Hypothyroid--uncontrolled  Suspect Dementia  HLD  Fall at home   7.6 aneurysm of the infrarenal abdominal aorta  vertebral compression fractures are seen at T11 and L1  Hypothermia 96.1    PLAN  --continue warming blanket  --PT/OT  -- Vascular surgery consulted for aneurysm  --At this time we will increase her Synthroid as her thyroid studies are abnormal  --Continue sliding scale insulin for diabetes  --We will obtain palliative care consult for long-term goals of care     Discussed plan with RN.    DVT prophylaxis:  No DVT  prophylaxis order currently exists.    CODE STATUS:   Level Of Support Discussed With: Patient  Code Status (Patient has no pulse and is not breathing): CPR (Attempt to Resuscitate)  Medical Interventions (Patient has pulse or is breathing): Full Support        Electronically signed by Myke Mitchell DO, 08/12/23, 11:56 AM EDT.

## 2023-08-12 NOTE — PAYOR COMM NOTE
"Swati, René Chinchilla (89 y.o. Female)       Date of Birth   11/14/1929    Social Security Number       Address   [address] [place] [address]    Home Phone   [phone]    MRN   [id number]       Pentecostal   Episcopal    Marital Status                               Admission Date   8/11/23    Admission Type   Emergency    Admitting Provider   Myke Mitchell DO    Attending Provider   Myke Mitchell DO    Department, Room/Bed   [facility] CARE UNIT 2, 260/1       Discharge Date       Discharge Disposition       Discharge Destination                                 Attending Provider: Myke Mitchell DO    Allergies: No Known Allergies    Isolation: None   Infection: None   Code Status: CPR    Ht: 165.1 cm (65\")   Wt: 75.6 kg (166 lb 10.7 oz)    Admission Cmt: None   Principal Problem: HANNAH (acute kidney injury) [N17.9]                   Active Insurance as of 8/11/2023       Primary Coverage       Payor Plan Insurance Group Employer/Plan Group    ANTHEM MEDICARE REPLACEMENT ANTHEM MEDICARE ADVANTAGE [id number]       Payor Plan Address Payor Plan Phone Number Payor Plan Fax Number Effective Dates    [address] [phone]  1/1/2022 - None Entered    [facility] [id number]         Subscriber Name Subscriber Birth Date Member ID       RENÉ KYLE NACHO 11/14/1929 [id number]               Secondary Coverage       Payor Plan Insurance Group Employer/Plan Group     FOR LIFE  FOR LIFE  SUP         Payor Plan Address Payor Plan Phone Number Payor Plan Fax Number Effective Dates    [address] [phone]  8/11/2023 - None Entered    [facility] [id number]         Subscriber Name Subscriber Birth Date Member ID       RENÉ KYLE NACHO 11/14/1929 [id number]                     Emergency Contacts        (Rel.) Home Phone Work Phone Mobile Phone    EDEL NOBLE (Relative) [phone] -- --    EFRAÍN RUDOLPH (Relative) [phone] -- --               "   History & Physical        Latanya Estrada MD at 23 2256           [facility] HISTORY AND PHYSICAL  Date: 2023   Patient Name: Jayashree Longoria  : 1929  MRN: [id number]  Primary Care Physician:  Provider, No Known  Date of admission: 2023    Subjective   Subjective     Chief Complaint: Fall and found on the floor    HPI:    Jayashree Longoria is a 89 y.o. female with a past medical history of  type 2 diabetes mellitus, hypothyroidism, hyperlipidemia presented to the ED for evaluation of low back pain.  Patient was found to be on the floor by a friend who regularly checks on her.  Patient said that she has fell yesterday while walking.  Unsure why she fell.  Denies any lightheadedness, palpitations prior to the fall.  Unsure if she hit her head or not.  Was not able to get up and was lying on the floor.  Patient states that she has not seen a doctor in the last few years.  Positive for lower back pain.  Denies any fevers, chills, abdominal pain, nausea, vomiting, focal weakness, numbness, tingling.    In the ED, vital signs temperature 97.9, pulse 82, respiratory 18, blood pressure 117/79 on room air saturating around 94%.  Labs showed initial troponin 63, sodium 136, potassium 3.5, bicarb 17.1, creatinine 2.21, 3 years ago was 1.3, magnesium 2.7, rest of the CMP within normal limits, WBC 14.17, normal hemoglobin, normal platelets, urinalysis does not look infected, chest x-ray did not show any acute cardiopulmonary process.  X-ray of the lumbar spine showed compression fraction of the L1 of indeterminate age.  Large left paravertebral mass with peripheral calvarial calcifications may represent an abdominal aortic aneurysm.  CT head without contrast did not show any acute intracranial abnormality.  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  No acute retroperitoneal hemorrhage is seen to suggest aneurysm leakage  or rupture.  Vertebral compression fracture at the T11 and L1 is noted.  Small bilateral pleural effusions are noted.  Case has been discussed by the ED physician with vascular surgeon on-call Dr. Vickers, will be evaluating the patient in the morning and discuss regarding the management options for abdominal aortic aneurysm.  Received IV fluids in the ED.  Patient has been admitted for further evaluation and management of abdominal aortic aneurysm, HANNAH, dehydration.      Personal History     Past medical history  Type 2 diabetes mellitus  Hypothyroidism  Hyperlipidemia    Unable to obtain past surgical history, family history as the patient has significant hearing problem and unable to obtain all the information.      No family history on file.      Social History     Socioeconomic History    Marital status:          Home Medications:  atenolol, furosemide, levothyroxine, metFORMIN, montelukast, potassium chloride, and simvastatin    Allergies:  No Known Allergies      Objective   Objective     Vitals:   Temp:  [97.9 øF (36.6 øC)] 97.9 øF (36.6 øC)  Heart Rate:  [76-83] 83  Resp:  [18] 18  BP: (111-117)/() 115/100    Physical Exam    Constitutional: Awake, alert, no acute distress   Eyes: Pupils equal, sclerae anicteric, no conjunctival injection   HENT: NCAT, mucous membranes dry   Neck: Supple, no thyromegaly, no lymphadenopathy, trachea midline   Respiratory: Clear to auscultation bilaterally, nonlabored respirations    Cardiovascular: RRR, no murmurs, rubs, or gallops   Gastrointestinal: Positive bowel sounds, soft, nontender, nondistended   Musculoskeletal: No bilateral ankle edema, no clubbing or cyanosis to extremities   Psychiatric: Appropriate affect, cooperative   Neurologic: Oriented x 3, strength symmetric 5/5 bilateral upper and lower extremities, speech clear   Skin: No rashes     Result Review    Result Review:  I have personally reviewed the results from the time of this admission to  8/11/2023 23:36 EDT and agree with these findings:  [x]  Laboratory  []  Microbiology  [x]  Radiology  [x]  EKG/Telemetry   []  Cardiology/Vascular   []  Pathology  []  Old records  []  Other:        Imaging Results (Last 24 Hours)       Procedure Component Value Units Date/Time    CT Abdomen Pelvis Without Contrast [[id number]] Collected: 08/11/23 2218     Updated: 08/11/23 2221    Narrative:      PROCEDURE: CT ABDOMEN PELVIS WO CONTRAST     COMPARISON: 8/11/2023.     INDICATIONS: Aneurysm on x-ray with back pain; generalized lower back pain.     TECHNIQUE: 728 CT images were created without intravenous contrast.       PROTOCOL:   Standard CT imaging protocol performed.      RADIATION:   Total DLP: 599.5 mGy*cm.    Automated exposure control was utilized to minimize radiation dose.      FINDINGS: There is a large (7.6 cm) infrarenal abdominal aortic aneurysm, probably fusiform in   configuration.  The aneurysm neck is roughly estimated at 3 cm, as on image 106 of series 202.  The   fusiform aneurysm extends is about 9.4 cm caudally to just above the aortic bifurcation.  No acute   retroperitoneal or intraperitoneal hemorrhage is seen to suggest aneurysm leakage or rupture.    There may be mild fusiform dilatation of the bilateral common iliac arteries.  The left common   iliac artery measures about 1.6 cm in greatest diameter.  The right common iliac artery measures   about 1.5 cm in greatest diameter.  The proximal left internal iliac artery measures about 1.4 cm   in greatest diameter.  The proximal right internal iliac artery measures about 1.1 cm in greatest   diameter.  Extensive atherosclerotic changes are seen.  For instance, extensive arterial   calcification involves the proximal bilateral renal arteries.  Minimal calcified plaque is seen at   the origins of the celiac trunk and superior mesenteric artery.  The inferior mesenteric artery is   not well evaluated by this study; it appears to arise from the  lower left anterior margin of the   large fusiform aneurysm.       Age-indeterminate vertebral compression fractures are seen at T11 and L1, estimated at 30 percent   or less in degree.  Vacuum phenomenon involves the disc interspaces at T11-12, T12-L1, L1-2, L2-3,   L3-4, and L4-5.  Severe degenerative changes are seen throughout the imaged spine.  There is   suspected levoscoliosis of the lumbar spine.  Chronic malalignment (at several levels) is seen   throughout the lumbar spine.       Additionally, colonic diverticulosis is seen without acute diverticulitis.  No acute appendicitis.    No mechanical bowel obstruction.  No pneumoperitoneum or pneumatosis.  The patient has undergone   cholecystectomy.  No acute pancreatitis.  Small to moderate sized hiatal hernia is seen.  There are   coronary artery calcifications.  No cardiac enlargement.  A trace amount of pericardial effusion is   seen.  There may be tiny bilateral pleural effusions.  Motion artifact obscures detail.  Mild   prominence of the adrenal glands is seen, which is nonspecific.  No definite hydronephrosis,   nephrolithiasis, or ureterolithiasis.  There are arterial calcifications in close proximity to the   distal ureters.  It appears that the patient has undergone hysterectomy.  Degenerative changes   involve the bilateral hip joints and the bilateral sacroiliac joints.  There is mild distension of   the urinary bladder.       Impression:            1. There is a 7.6 cm fusiform aneurysm of the infrarenal abdominal aorta, as discussed.  No acute   retroperitoneal hemorrhage is seen to suggest aneurysm leakage or rupture.       2. Age-indeterminate (but possibly subacute) vertebral compression fractures are seen at T11 and   L1, estimated at 30 percent or less in degree.  There is minimal if any posterior wall retropulsion   associated with these findings.       3. There are small bilateral pleural effusions.       4. The patient has undergone  hysterectomy.       5. Probably no significant acute findings are seen otherwise.       6. Please see above comments for further detail.             A preliminary report (regarding the abdominal aortic aneurysm, AAA) was given to Dr. Moya   (ordering/attending [facility] ED Physician) at approximately 2205 hours on 8/11/2023.       Please note that portions of this note were completed with a voice recognition program.     VALERIA FRIEDMAN JR, MD         Electronically Signed and Approved By: VALERIA FRIEDMAN JR, MD on 8/11/2023 at 22:18                        CT Head Without Contrast [[id number]] Collected: 08/1934     Updated: 08/11/23 1938    Narrative:      PROCEDURE: CT HEAD WO CONTRAST     COMPARISON:  None     INDICATIONS: HEAD TRAUMA POST FALL X TODAY, LOC     PROTOCOL:   Standard imaging protocol performed      RADIATION:   DLP: 954.9 mGy*cm    MA and/or KV was adjusted to minimize radiation dose.       TECHNIQUE: CT images were obtained without non-ionic intravenous contrast material.      FINDINGS:   The ventricles, sulci, and cerebellar folia are mildly and diffusely prominent consistent with   atrophy.     Ill-defined diminished density in cerebral white matter is consistent with mild gliosis and/or   scattered old lacunar infarcts.     There is no CT evidence of acute intracranial hemorrhage, mass, or mass effect.     The orbits have a normal appearance.     The paranasal sinuses, middle ears, and mastoid air cells are well aerated.     No fractures are seen on bone window images.       Impression:         CT scan of the head without IV contrast demonstrating mild atrophy and white matter changes.     No acute intracranial abnormality is seen.            HERNANDEZ TILLEY MD         Electronically Signed and Approved By: HERNANDEZ TILLEY MD on 8/11/2023 at 19:35                     XR Chest 1 View [[id number]] Collected: 08/11/23 1854     Updated: 08/11/23 1857    Narrative:      PROCEDURE: XR CHEST 1 VW      COMPARISON: [facility], CR, CHEST PA/AP & LAT 2V, 11/06/2010, 21:59.     INDICATIONS: Weak/Dizzy/AMS triage protocol/GENERALIZED WEAKNESS, FALL     FINDINGS:   The heart is normal in size.  The lungs are well-expanded and free of infiltrates.     Healed fracture of the lateral left 6th rib is evident.       Impression:         No active cardiopulmonary disease is seen.                  HERNANDEZ TILLEY MD         Electronically Signed and Approved By: HERNANDEZ TILLEY MD on 8/11/2023 at 18:54                     XR Spine Lumbar 2 or 3 View [[id number]] Collected: 08/11/23 1853     Updated: 08/11/23 1856    Narrative:      PROCEDURE: XR SPINE LUMBAR 2 OR 3 VW     COMPARISON: [facility], MR, LUMBAR SPINE WITHOUT, 3/02/2010, 17:47.     INDICATIONS: GENERALIZED LOWER BACK PAIN AFTER FALL     FINDINGS:   Mild anterior wedging compression fracture of L1 is of indeterminate age.  This is not evident on   3/2/2010.     Vertebral body heights are otherwise well maintained.  Bony structures have an osteopenic   appearance consistent with osteoporosis.     Disc spaces are maintained.     Moderate facet arthropathy is seen at L3-4.  Marked facet arthropathy is seen at L4-5.  A few mm of   subluxation of L4 anteriorly over L5 is probably on a degenerative basis, and is unchanged in   comparison to 3/2/2010.     Marked facet arthropathy is seen at L5-S1.     Left paravertebral mass measures 11 cm in greatest craniocaudal dimension, and 8 cm in greatest   transverse dimension.  Curvilinear calcifications are evident.  This may represent a large   abdominal aortic aneurysm, not evident on the prior MR.       Impression:         Lumbar spine series demonstrating mild anterior wedging compression fracture of L1 of indeterminate   age.     Large left paravertebral mass with peripheral curvilinear calcifications may represent an abdominal   aortic aneurysm.            HERNANDEZ TILLEY MD         Electronically  Signed and Approved By: HERNANDEZ TILLEY MD on 8/11/2023 at 18:53                                      Assessment & Plan   Assessment / Plan     Assessment/Plan:   Fall  HANNAH  Dehydration  7.6 cm infrarenal abdominal aortic aneurysm  T11 and L1 subacute compression fracture  Type 2 diabetes mellitus  Hypothyroidism  Hyperlipidemia    Plan  Admit to observation, remote telemetry  Received 2 L IV fluids  Continue LR at 100 cc/h for next 12 hours  Monitor urine output  Monitor electrolytes, kidney function with a.m. labs  Case has been discussed by the ED physician with vascular surgeon on-call Dr. Vickers, will be evaluating the patient in the morning to discuss regarding the management options for abdominal aortic aneurysm  Consistent carb diet  Low-dose sliding scale insulin  Fall precautions  PT OT when appropriate      DVT prophylaxis:  No DVT prophylaxis order currently exists.    CODE STATUS:    Level Of Support Discussed With: Patient  Code Status (Patient has no pulse and is not breathing): CPR (Attempt to Resuscitate)  Medical Interventions (Patient has pulse or is breathing): Full Support      Admission Status:  I believe this patient meets observation status.    Part of this note may be an electronic transcription/translation of spoken language to printed text using the Dragon Dictation System    Latanya Estrada MD               Electronically signed by Latanya Estrada MD at 08/11/23 2336       Vital Signs (last day)       Date/Time Temp Temp src Pulse Resp BP Patient Position SpO2    08/12/23 1129 97.5 (36.4) Oral 91 18 137/83 Lying 96    08/12/23 0727 96.8 (36) Rectal 77 18 125/75 Lying 95    08/12/23 0511 96.3 (35.7) Rectal 78 18 118/71 Lying 91    08/12/23 0220 96 (35.6) Rectal -- -- -- -- --    08/12/23 0120 96.1 (35.6) Rectal 77 18 147/76 Lying 100    08/11/23 2348 -- -- 79 -- 115/85 -- 98    08/11/23 2338 -- -- 73 -- -- -- 100    08/11/23 2228 -- -- 77 -- 122/91 -- --    08/11/23 2158 -- -- 84 --  115/90 -- --    08/11/23 2128 -- -- 79 -- 122/86 -- 95    08/11/23 1900 -- -- 83 -- 115/100 -- --    08/11/23 1745 -- -- 76 -- 111/84 -- --    08/11/23 1712 -- -- -- -- 117/79 -- --    08/11/23 1707 97.9 (36.6) Oral 82 18 -- -- 94          Lab Results (last 24 hours)       Procedure Component Value Units Date/Time    T4, Free [[id number]]  (Abnormal) Collected: 08/11/23 1806    Specimen: Blood Updated: 08/12/23 0226     Free T4 0.49 ng/dL     Narrative:      Results may be falsely increased if patient taking Biotin.      High Sensitivity Troponin T 2Hr [[id number]]  (Abnormal) Collected: 08/12/23 0116    Specimen: Blood from Arm, Left Updated: 08/12/23 0203     HS Troponin T 62 ng/L      Troponin T Delta -1 ng/L     Narrative:      High Sensitive Troponin T Reference Range:  <10.0 ng/L- Negative Female for AMI  <15.0 ng/L- Negative Male for AMI  >=10 - Abnormal Female indicating possible myocardial injury.  >=15 - Abnormal Male indicating possible myocardial injury.   Clinicians would have to utilize clinical acumen, EKG, Troponin, and serial changes to determine if it is an Acute Myocardial Infarction or myocardial injury due to an underlying chronic condition.         Magnesium [[id number]]  (Abnormal) Collected: 08/12/23 0116    Specimen: Blood from Arm, Left Updated: 08/12/23 0150     Magnesium 2.6 mg/dL     Comprehensive Metabolic Panel [[id number]]  (Abnormal) Collected: 08/12/23 0116    Specimen: Blood from Arm, Left Updated: 08/12/23 0150     Glucose 128 mg/dL      BUN 54 mg/dL      Creatinine 2.07 mg/dL      Sodium 139 mmol/L      Potassium 2.7 mmol/L      Chloride 108 mmol/L      CO2 17.2 mmol/L      Calcium 9.5 mg/dL      Total Protein 5.9 g/dL      Albumin 3.3 g/dL      ALT (SGPT) 18 U/L      AST (SGOT) 13 U/L      Alkaline Phosphatase 94 U/L      Total Bilirubin 1.1 mg/dL      Globulin 2.6 gm/dL      A/G Ratio 1.3 g/dL      BUN/Creatinine Ratio 26.1     Anion Gap 13.8 mmol/L      eGFR 22.0 mL/min/1.73      Narrative:      GFR Normal >60  Chronic Kidney Disease <60  Kidney Failure <15    The GFR formula is only valid for adults with stable renal function between ages 18 and 70.    Phosphorus [[id number]]  (Normal) Collected: 08/12/23 0116    Specimen: Blood from Arm, Left Updated: 08/12/23 0150     Phosphorus 2.9 mg/dL     TSH Rfx On Abnormal To Free T4 [[id number]]  (Abnormal) Collected: 08/11/23 1806    Specimen: Blood Updated: 08/12/23 0122     TSH 13.330 uIU/mL     CBC Auto Differential [[id number]]  (Abnormal) Collected: 08/12/23 0116    Specimen: Blood from Arm, Left Updated: 08/12/23 0122     WBC 14.99 10*3/mm3      RBC 4.05 10*6/mm3      Hemoglobin 12.7 g/dL      Hematocrit 37.6 %      MCV 92.8 fL      MCH 31.4 pg      MCHC 33.8 g/dL      RDW 18.5 %      RDW-SD 56.1 fl      MPV 8.6 fL      Platelets 225 10*3/mm3      Neutrophil % 81.0 %      Lymphocyte % 8.2 %      Monocyte % 9.9 %      Eosinophil % 0.3 %      Basophil % 0.1 %      Immature Grans % 0.5 %      Neutrophils, Absolute 12.14 10*3/mm3      Lymphocytes, Absolute 1.23 10*3/mm3      Monocytes, Absolute 1.48 10*3/mm3      Eosinophils, Absolute 0.05 10*3/mm3      Basophils, Absolute 0.02 10*3/mm3      Immature Grans, Absolute 0.07 10*3/mm3      nRBC 0.0 /100 WBC     Phosphorus [[id number]]  (Normal) Collected: 08/11/23 1806    Specimen: Blood Updated: 08/12/23 0112     Phosphorus 3.0 mg/dL     CK [[id number]]  (Normal) Collected: 08/11/23 1806    Specimen: Blood Updated: 08/11/23 2346     Creatine Kinase 101 U/L     Urinalysis With Microscopic If Indicated (No Culture) - Urine, Clean Catch [[id number]]  (Abnormal) Collected: 08/11/23 1939    Specimen: Urine, Clean Catch Updated: 08/11/23 2006     Color, UA Yellow     Appearance, UA Clear     pH, UA 5.5     Specific Gravity, UA 1.025     Glucose, UA Negative     Ketones, UA Trace     Bilirubin, UA Negative     Blood, UA Negative     Protein, UA 30 mg/dL (1+)     Leuk Esterase, UA Negative     Nitrite, UA  Negative     Urobilinogen, UA 0.2 E.U./dL    Urinalysis, Microscopic Only - Urine, Clean Catch [[id number]]  (Abnormal) Collected: 08/11/23 1939    Specimen: Urine, Clean Catch Updated: 08/11/23 2006     RBC, UA 0-2 /HPF      WBC, UA 0-2 /HPF      Bacteria, UA None Seen /HPF      Squamous Epithelial Cells, UA 0-2 /HPF      Hyaline Casts, UA 0-2 /LPF      Methodology Automated Microscopy    Single High Sensitivity Troponin T [[id number]]  (Abnormal) Collected: 08/11/23 1806    Specimen: Blood Updated: 08/11/23 1907     HS Troponin T 63 ng/L     Narrative:      High Sensitive Troponin T Reference Range:  <10.0 ng/L- Negative Female for AMI  <15.0 ng/L- Negative Male for AMI  >=10 - Abnormal Female indicating possible myocardial injury.  >=15 - Abnormal Male indicating possible myocardial injury.   Clinicians would have to utilize clinical acumen, EKG, Troponin, and serial changes to determine if it is an Acute Myocardial Infarction or myocardial injury due to an underlying chronic condition.         Comprehensive Metabolic Panel [[id number]]  (Abnormal) Collected: 08/11/23 1806    Specimen: Blood Updated: 08/11/23 1846     Glucose 148 mg/dL      BUN 59 mg/dL      Creatinine 2.21 mg/dL      Sodium 136 mmol/L      Potassium 3.5 mmol/L      Comment: Slight hemolysis detected by analyzer. Results may be affected.        Chloride 106 mmol/L      CO2 17.1 mmol/L      Calcium 9.6 mg/dL      Total Protein 6.6 g/dL      Albumin 3.6 g/dL      ALT (SGPT) 21 U/L      AST (SGOT) 20 U/L      Comment: Slight hemolysis detected by analyzer. Results may be affected.        Alkaline Phosphatase 101 U/L      Total Bilirubin 0.9 mg/dL      Globulin 3.0 gm/dL      A/G Ratio 1.2 g/dL      BUN/Creatinine Ratio 26.7     Anion Gap 12.9 mmol/L      eGFR 20.3 mL/min/1.73     Narrative:      GFR Normal >60  Chronic Kidney Disease <60  Kidney Failure <15    The GFR formula is only valid for adults with stable renal function between ages 18 and 70.     Magnesium [[id number]]  (Abnormal) Collected: 08/11/23 1806    Specimen: Blood Updated: 08/11/23 1846     Magnesium 2.7 mg/dL     [facility] [[id number]] Collected: 08/11/23 1806    Specimen: Blood Updated: 08/11/23 1825    Narrative:      The following orders were created for panel order [facility].  Procedure                               Abnormality         Status                     ---------                               -----------         ------                     Green Top (Gel)[[id number]]                                  Final result               Lavender Top[[id number]]                                     Final result               Gold Top - SST[[id number]]                                   Final result               Light Blue Top[[id number]]                                   Final result                 Please view results for these tests on the individual orders.    Gold Top - SST [[id number]] Collected: 08/11/23 1806    Specimen: Blood Updated: 08/11/23 1825     Extra Tube Hold for add-ons.     Comment: Auto resulted.       Green Top (Gel) [[id number]] Collected: 08/11/23 1806    Specimen: Blood Updated: 08/11/23 1825     Extra Tube Hold for add-ons.     Comment: Auto resulted.       Light Blue Top [[id number]] Collected: 08/11/23 1806    Specimen: Blood Updated: 08/11/23 1825     Extra Tube Hold for add-ons.     Comment: Auto resulted       Lavender Top [[id number]] Collected: 08/11/23 1806    Specimen: Blood Updated: 08/11/23 1823     Extra Tube hold for add-on     Comment: Auto resulted       CBC & Differential [[id number]]  (Abnormal) Collected: 08/11/23 1806    Specimen: Blood Updated: 08/11/23 1822    Narrative:      The following orders were created for panel order CBC & Differential.  Procedure                               Abnormality         Status                     ---------                               -----------         ------                     CBC Auto Differential[[id number]]         Abnormal            Final result                 Please view results for these tests on the individual orders.    CBC Auto Differential [[id number]]  (Abnormal) Collected: 08/11/23 1806    Specimen: Blood Updated: 08/11/23 1822     WBC 14.17 10*3/mm3      RBC 4.25 10*6/mm3      Hemoglobin 13.2 g/dL      Hematocrit 39.0 %      MCV 91.8 fL      MCH 31.1 pg      MCHC 33.8 g/dL      RDW 18.0 %      RDW-SD 53.8 fl      MPV 9.0 fL      Platelets 238 10*3/mm3      Neutrophil % 83.1 %      Lymphocyte % 6.3 %      Monocyte % 9.5 %      Eosinophil % 0.1 %      Basophil % 0.2 %      Immature Grans % 0.8 %      Neutrophils, Absolute 11.78 10*3/mm3      Lymphocytes, Absolute 0.89 10*3/mm3      Monocytes, Absolute 1.35 10*3/mm3      Eosinophils, Absolute 0.01 10*3/mm3      Basophils, Absolute 0.03 10*3/mm3      Immature Grans, Absolute 0.11 10*3/mm3      nRBC 0.0 /100 WBC           Imaging Results (Last 24 Hours)       Procedure Component Value Units Date/Time    CT Abdomen Pelvis Without Contrast [[id number]] Collected: 08/11/23 2218     Updated: 08/11/23 2221    Narrative:      PROCEDURE: CT ABDOMEN PELVIS WO CONTRAST     COMPARISON: 8/11/2023.     INDICATIONS: Aneurysm on x-ray with back pain; generalized lower back pain.     TECHNIQUE: 728 CT images were created without intravenous contrast.       PROTOCOL:   Standard CT imaging protocol performed.      RADIATION:   Total DLP: 599.5 mGy*cm.    Automated exposure control was utilized to minimize radiation dose.      FINDINGS: There is a large (7.6 cm) infrarenal abdominal aortic aneurysm, probably fusiform in   configuration.  The aneurysm neck is roughly estimated at 3 cm, as on image 106 of series 202.  The   fusiform aneurysm extends is about 9.4 cm caudally to just above the aortic bifurcation.  No acute   retroperitoneal or intraperitoneal hemorrhage is seen to suggest aneurysm leakage or rupture.    There may be mild fusiform dilatation of the bilateral common iliac  arteries.  The left common   iliac artery measures about 1.6 cm in greatest diameter.  The right common iliac artery measures   about 1.5 cm in greatest diameter.  The proximal left internal iliac artery measures about 1.4 cm   in greatest diameter.  The proximal right internal iliac artery measures about 1.1 cm in greatest   diameter.  Extensive atherosclerotic changes are seen.  For instance, extensive arterial   calcification involves the proximal bilateral renal arteries.  Minimal calcified plaque is seen at   the origins of the celiac trunk and superior mesenteric artery.  The inferior mesenteric artery is   not well evaluated by this study; it appears to arise from the lower left anterior margin of the   large fusiform aneurysm.       Age-indeterminate vertebral compression fractures are seen at T11 and L1, estimated at 30 percent   or less in degree.  Vacuum phenomenon involves the disc interspaces at T11-12, T12-L1, L1-2, L2-3,   L3-4, and L4-5.  Severe degenerative changes are seen throughout the imaged spine.  There is   suspected levoscoliosis of the lumbar spine.  Chronic malalignment (at several levels) is seen   throughout the lumbar spine.       Additionally, colonic diverticulosis is seen without acute diverticulitis.  No acute appendicitis.    No mechanical bowel obstruction.  No pneumoperitoneum or pneumatosis.  The patient has undergone   cholecystectomy.  No acute pancreatitis.  Small to moderate sized hiatal hernia is seen.  There are   coronary artery calcifications.  No cardiac enlargement.  A trace amount of pericardial effusion is   seen.  There may be tiny bilateral pleural effusions.  Motion artifact obscures detail.  Mild   prominence of the adrenal glands is seen, which is nonspecific.  No definite hydronephrosis,   nephrolithiasis, or ureterolithiasis.  There are arterial calcifications in close proximity to the   distal ureters.  It appears that the patient has undergone hysterectomy.   Degenerative changes   involve the bilateral hip joints and the bilateral sacroiliac joints.  There is mild distension of   the urinary bladder.       Impression:            1. There is a 7.6 cm fusiform aneurysm of the infrarenal abdominal aorta, as discussed.  No acute   retroperitoneal hemorrhage is seen to suggest aneurysm leakage or rupture.       2. Age-indeterminate (but possibly subacute) vertebral compression fractures are seen at T11 and   L1, estimated at 30 percent or less in degree.  There is minimal if any posterior wall retropulsion   associated with these findings.       3. There are small bilateral pleural effusions.       4. The patient has undergone hysterectomy.       5. Probably no significant acute findings are seen otherwise.       6. Please see above comments for further detail.             A preliminary report (regarding the abdominal aortic aneurysm, AAA) was given to Dr. Moya   (ordering/attending [facility] ED Physician) at approximately 2205 hours on 8/11/2023.       Please note that portions of this note were completed with a voice recognition program.     VALERIA FRIEDMAN JR, MD         Electronically Signed and Approved By: VALERIA FRIEDMAN JR, MD on 8/11/2023 at 22:18                        CT Head Without Contrast [[id number]] Collected: 08/1934     Updated: 08/11/23 1938    Narrative:      PROCEDURE: CT HEAD WO CONTRAST     COMPARISON:  None     INDICATIONS: HEAD TRAUMA POST FALL X TODAY, LOC     PROTOCOL:   Standard imaging protocol performed      RADIATION:   DLP: 954.9 mGy*cm    MA and/or KV was adjusted to minimize radiation dose.       TECHNIQUE: CT images were obtained without non-ionic intravenous contrast material.      FINDINGS:   The ventricles, sulci, and cerebellar folia are mildly and diffusely prominent consistent with   atrophy.     Ill-defined diminished density in cerebral white matter is consistent with mild gliosis and/or   scattered old lacunar infarcts.     There  is no CT evidence of acute intracranial hemorrhage, mass, or mass effect.     The orbits have a normal appearance.     The paranasal sinuses, middle ears, and mastoid air cells are well aerated.     No fractures are seen on bone window images.       Impression:         CT scan of the head without IV contrast demonstrating mild atrophy and white matter changes.     No acute intracranial abnormality is seen.            HERNANDEZ TILLEY MD         Electronically Signed and Approved By: HERNANDEZ TILLEY MD on 8/11/2023 at 19:35                     XR Chest 1 View [[id number]] Collected: 08/11/23 1854     Updated: 08/11/23 1857    Narrative:      PROCEDURE: XR CHEST 1 VW     COMPARISON: [facility], , CHEST PA/AP & LAT 2V, 11/06/2010, 21:59.     INDICATIONS: Weak/Dizzy/AMS triage protocol/GENERALIZED WEAKNESS, FALL     FINDINGS:   The heart is normal in size.  The lungs are well-expanded and free of infiltrates.     Healed fracture of the lateral left 6th rib is evident.       Impression:         No active cardiopulmonary disease is seen.                  HERNANDEZ TILLEY MD         Electronically Signed and Approved By: HERNANDEZ TILLEY MD on 8/11/2023 at 18:54                     XR Spine Lumbar 2 or 3 View [[id number]] Collected: 08/11/23 1853     Updated: 08/11/23 1856    Narrative:      PROCEDURE: XR SPINE LUMBAR 2 OR 3 VW     COMPARISON: [facility], , LUMBAR SPINE WITHOUT, 3/02/2010, 17:47.     INDICATIONS: GENERALIZED LOWER BACK PAIN AFTER FALL     FINDINGS:   Mild anterior wedging compression fracture of L1 is of indeterminate age.  This is not evident on   3/2/2010.     Vertebral body heights are otherwise well maintained.  Bony structures have an osteopenic   appearance consistent with osteoporosis.     Disc spaces are maintained.     Moderate facet arthropathy is seen at L3-4.  Marked facet arthropathy is seen at L4-5.  A few mm of   subluxation of L4 anteriorly over L5 is probably on a  degenerative basis, and is unchanged in   comparison to 3/2/2010.     Marked facet arthropathy is seen at L5-S1.     Left paravertebral mass measures 11 cm in greatest craniocaudal dimension, and 8 cm in greatest   transverse dimension.  Curvilinear calcifications are evident.  This may represent a large   abdominal aortic aneurysm, not evident on the prior MR.       Impression:         Lumbar spine series demonstrating mild anterior wedging compression fracture of L1 of indeterminate   age.     Large left paravertebral mass with peripheral curvilinear calcifications may represent an abdominal   aortic aneurysm.            HERNANDEZ TILLEY MD         Electronically Signed and Approved By: HERNANDEZ TILLEY MD on 2023 at 18:53                              Physician Progress Notes (last 24 hours)        Myke Mitchell DO at 23 1156           [facility] Hospitalist Progress Note  Date: 2023  Patient Name: Jayashree Longoria  : 1929  MRN: [id number]  Date of admission: 2023      Subjective   Subjective     Chief Complaint: Fall and found down     Summary:   89 y.o. female with a past medical history of  type 2 diabetes mellitus, hypothyroidism, hyperlipidemia presented to the ED for evaluation of low back pain.  Patient was found to be on the floor by a friend who regularly checks on her.  Patient said that she has fell yesterday while walking.  Unsure why she fell.  Denies any lightheadedness, palpitations prior to the fall.  Unsure if she hit her head or not.  Was not able to get up and was lying on the floor.  Patient states that she has not seen a doctor in the last few years.  Positive for lower back pain.  Denies any fevers, chills, abdominal pain, nausea, vomiting, focal weakness, numbness, tingling.     In the ED, vital signs temperature 97.9, pulse 82, respiratory 18, blood pressure 117/79 on room air saturating around 94%.  Labs showed initial troponin 63, sodium 136, potassium  3.5, bicarb 17.1, creatinine 2.21, 3 years ago was 1.3, magnesium 2.7, rest of the CMP within normal limits, WBC 14.17, normal hemoglobin, normal platelets, urinalysis does not look infected, chest x-ray did not show any acute cardiopulmonary process.  X-ray of the lumbar spine showed compression fraction of the L1 of indeterminate age.  Large left paravertebral mass with peripheral calvarial calcifications may represent an abdominal aortic aneurysm.  CT head without contrast did not show any acute intracranial abnormality.  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  No acute retroperitoneal hemorrhage is seen to suggest aneurysm leakage or rupture.  Vertebral compression fracture at the T11 and L1 is noted.  Small bilateral pleural effusions are noted.  Case has been discussed by the ED physician with vascular surgeon on-call Dr. Vickers, will be evaluating the patient in the morning and discuss regarding the management options for abdominal aortic aneurysm.  Received IV fluids in the ED.  Patient has been admitted for further evaluation and management of abdominal aortic aneurysm, HANNAH, dehydration.       Interval Followup:   Patient is very confused and asking to go home  She denies any specific complaints     Review of Systems   Unable to obtain due to mental status     Objective   Objective     Vitals:   Temp:  [96 øF (35.6 øC)-97.9 øF (36.6 øC)] 96.8 øF (36 øC)  Heart Rate:  [73-84] 77  Resp:  [18] 18  BP: (111-147)/() 125/75  Physical Exam    Constitutional: Awake, alert, no acute distress resting in bed with warmer on    Eyes: Pupils equal, sclerae anicteric, no conjunctival injection   HENT: NCAT, mucous membranes moist   Neck: Supple, no thyromegaly, no lymphadenopathy, trachea midline   Respiratory: Clear to auscultation bilaterally, nonlabored respirations    Cardiovascular: RRR, no murmurs, rubs, or gallops, palpable pedal pulses  bilaterally   Gastrointestinal: Positive bowel sounds, soft, nontender, nondistended   Musculoskeletal: No bilateral ankle edema, no clubbing or cyanosis to extremities   Psychiatric: Appropriate affect, cooperative   Neurologic: Oriented x 31 strength symmetric in all extremities, Cranial Nerves grossly intact to confrontation, speech clear   Skin: No rashes     Result Review    Result Review:  I have personally reviewed the results from the time of this admission to 8/12/2023 11:56 EDT and agree with these findings:  [x]  Laboratory  CBC          8/11/2023    18:06 8/12/2023    01:16   CBC   WBC 14.17  14.99    RBC 4.25  4.05    Hemoglobin 13.2  12.7    Hematocrit 39.0  37.6    MCV 91.8  92.8    MCH 31.1  31.4    MCHC 33.8  33.8    RDW 18.0  18.5    Platelets 238  225      BMP          8/11/2023    18:06 8/12/2023    01:16   BMP   BUN 59  54    Creatinine 2.21  2.07    Sodium 136  139    Potassium 3.5  2.7    Chloride 106  108    CO2 17.1  17.2    Calcium 9.6  9.5        [x]  Microbiology  No results found for: ACANTHNAEG, AFBCX, BPERTUSSISCX, BLOODCX  No results found for: BCIDPCR, CXREFLEX, CSFCX, CULTURETIS  No results found for: CULTURES, HSVCX, URCX  No results found for: EYECULTURE, GCCX, HSVCULTURE, LABHSV  No results found for: LEGIONELLA, MRSACX, MUMPSCX, MYCOPLASCX  No results found for: NOCARDIACX, STOOLCX  No results found for: THROATCX, UNSTIMCULT, URINECX, CULTURE, VZVCULTUR  No results found for: VIRALCULTU, WOUNDCX    [x]  Radiology  XR Chest 1 View    Result Date: 8/11/2023  PROCEDURE: XR CHEST 1 VW  COMPARISON: [facility], CR, CHEST PA/AP & LAT 2V, 11/06/2010, 21:59.  INDICATIONS: Weak/Dizzy/AMS triage protocol/GENERALIZED WEAKNESS, FALL  FINDINGS:  The heart is normal in size.  The lungs are well-expanded and free of infiltrates.  Healed fracture of the lateral left 6th rib is evident.      Impression:   No active cardiopulmonary disease is seen.       HERNANDEZ TILLEY MD        Electronically Signed and Approved By: HERNANDEZ TILLEY MD on 8/11/2023 at 18:54            CT Abdomen Pelvis Without Contrast    Result Date: 8/11/2023  PROCEDURE: CT ABDOMEN PELVIS WO CONTRAST  COMPARISON: 8/11/2023.  INDICATIONS: Aneurysm on x-ray with back pain; generalized lower back pain.  TECHNIQUE: 728 CT images were created without intravenous contrast.   PROTOCOL:   Standard CT imaging protocol performed.    RADIATION:   Total DLP: 599.5 mGy*cm.   Automated exposure control was utilized to minimize radiation dose.  FINDINGS: There is a large (7.6 cm) infrarenal abdominal aortic aneurysm, probably fusiform in configuration.  The aneurysm neck is roughly estimated at 3 cm, as on image 106 of series 202.  The fusiform aneurysm extends is about 9.4 cm caudally to just above the aortic bifurcation.  No acute retroperitoneal or intraperitoneal hemorrhage is seen to suggest aneurysm leakage or rupture.  There may be mild fusiform dilatation of the bilateral common iliac arteries.  The left common iliac artery measures about 1.6 cm in greatest diameter.  The right common iliac artery measures about 1.5 cm in greatest diameter.  The proximal left internal iliac artery measures about 1.4 cm in greatest diameter.  The proximal right internal iliac artery measures about 1.1 cm in greatest diameter.  Extensive atherosclerotic changes are seen.  For instance, extensive arterial calcification involves the proximal bilateral renal arteries.  Minimal calcified plaque is seen at the origins of the celiac trunk and superior mesenteric artery.  The inferior mesenteric artery is not well evaluated by this study; it appears to arise from the lower left anterior margin of the large fusiform aneurysm.   Age-indeterminate vertebral compression fractures are seen at T11 and L1, estimated at 30 percent or less in degree.  Vacuum phenomenon involves the disc interspaces at T11-12, T12-L1, L1-2, L2-3, L3-4, and L4-5.  Severe  degenerative changes are seen throughout the imaged spine.  There is suspected levoscoliosis of the lumbar spine.  Chronic malalignment (at several levels) is seen throughout the lumbar spine.   Additionally, colonic diverticulosis is seen without acute diverticulitis.  No acute appendicitis.  No mechanical bowel obstruction.  No pneumoperitoneum or pneumatosis.  The patient has undergone cholecystectomy.  No acute pancreatitis.  Small to moderate sized hiatal hernia is seen.  There are coronary artery calcifications.  No cardiac enlargement.  A trace amount of pericardial effusion is seen.  There may be tiny bilateral pleural effusions.  Motion artifact obscures detail.  Mild prominence of the adrenal glands is seen, which is nonspecific.  No definite hydronephrosis, nephrolithiasis, or ureterolithiasis.  There are arterial calcifications in close proximity to the distal ureters.  It appears that the patient has undergone hysterectomy.  Degenerative changes involve the bilateral hip joints and the bilateral sacroiliac joints.  There is mild distension of the urinary bladder.      Impression:    1. There is a 7.6 cm fusiform aneurysm of the infrarenal abdominal aorta, as discussed.  No acute retroperitoneal hemorrhage is seen to suggest aneurysm leakage or rupture.   2. Age-indeterminate (but possibly subacute) vertebral compression fractures are seen at T11 and L1, estimated at 30 percent or less in degree.  There is minimal if any posterior wall retropulsion associated with these findings.   3. There are small bilateral pleural effusions.   4. The patient has undergone hysterectomy.   5. Probably no significant acute findings are seen otherwise.   6. Please see above comments for further detail.     A preliminary report (regarding the abdominal aortic aneurysm, AAA) was given to Dr. Moya (ordering/attending [facility] ED Physician) at approximately 2205 hours on 8/11/2023.   Please note that portions of this note  were completed with a voice recognition program.  VALERIA FRIEDMAN JR, MD       Electronically Signed and Approved By: VALERIA FRIEDMAN JR, MD on 2023 at 22:18                 []  EKG/Telemetry   []  Cardiology/Vascular   []  Pathology  []  Old records  []  Other:    Assessment & Plan   Assessment / Plan     Assessment/Plan:  DM Type 2  Hypothyroid--uncontrolled  Suspect Dementia  HLD  Fall at home   7.6 aneurysm of the infrarenal abdominal aorta  vertebral compression fractures are seen at T11 and L1  Hypothermia 96.1    PLAN  --continue warming blanket  --PT/OT  -- Vascular surgery consulted for aneurysm  --At this time we will increase her Synthroid as her thyroid studies are abnormal  --Continue sliding scale insulin for diabetes  --We will obtain palliative care consult for long-term goals of care     Discussed plan with RN.    DVT prophylaxis:  No DVT prophylaxis order currently exists.    CODE STATUS:   Level Of Support Discussed With: Patient  Code Status (Patient has no pulse and is not breathing): CPR (Attempt to Resuscitate)  Medical Interventions (Patient has pulse or is breathing): Full Support        Electronically signed by Myke Mitchell DO, 23, 11:56 AM EDT.             Electronically signed by Myke Mitchell DO at 23 1304          Consult Notes (last 24 hours)        Jakob Vickers MD at 23 1312        Consult Orders    1. Inpatient Vascular Surgery Consult [[id number]] ordered by Latanya Estrada MD at 23 [id number]                  [facility]   VASCULAR SURGERY CONSULT    Patient Name: Jayashree Longoria  : 1929  MRN: [id number]  Primary Care Physician:  Provider, No Known  Date of admission: 2023    Subjective   Subjective     Chief Complaint: Abdominal aorta aneurysm    HPI:    Jayashree Longoria is a 89 y.o. female who was found at home on the floor by a friend.  The patient said she had fallen and was not sure why.  She was brought to the hospital for  further evaluation.  She was found to be in acute kidney injury.  She was also as part of her evaluation identified to have an abdominal aorta aneurysm.  I have been asked to evaluate from the vascular standpoint.  She had no prior knowledge of the aneurysm.    Review of Systems    Noncontributory except for the history of present illness.    Personal History     Past Medical History:   Diagnosis Date    Diabetes mellitus     Disease of thyroid gland     Hyperlipidemia     Hypertension     Renal disorder        History reviewed. No pertinent surgical history.    Family History: family history is not on file. Otherwise pertinent FHx was reviewed and not pertinent to current issue.    Social History:  reports that she has never smoked. She has never used smokeless tobacco. She reports that she does not drink alcohol and does not use drugs.    Home Medications:  atenolol, furosemide, levothyroxine, metFORMIN, montelukast, potassium chloride, and simvastatin      Allergies:  No Known Allergies    Objective   Objective     Vitals:   Temp:  [96 øF (35.6 øC)-97.9 øF (36.6 øC)] 97.5 øF (36.4 øC)  Heart Rate:  [73-91] 91  Resp:  [18] 18  BP: (111-147)/() 137/83    Physical Exam    General: Awake, alert, NAD   Eyes:  JOSAFAT   Neck: Supple   Lungs: Clear   Heart: RRR   Abdomen: benign   Musculoskeletal:  normal motor tone, symmetric   Skin: warm, normal turgor   Neuro: strength 5/5 all extremities      Diagnostic studies: A CT scan of the abdomen and pelvis without contrast dated 8/11/2023 has been reviewed.  An infrarenal abdominal aorta aneurysm with a maximum diameter on my review of 7.1 cm is identified.    Assessment & Plan   Assessment / Plan     Active Hospital Problems:  Active Hospital Problems    Diagnosis     **HANNAH (acute kidney injury)     Hypothermia        Assessment/plan:   Mrs. Longoria has a large infrarenal abdominal aortic aneurysm.  I have discussed with her the presence of the aneurysm as well as the  concern for rupture.  I discussed with her management options to include surgical intervention versus no intervention.  At this time she would like to hold off on any intervention.  She does not feel well and does not want to have anything done at this time.  She would like to think about it and will discuss at a later time.  I explained to her that if the aneurysm ruptures the chances of survival are small.  She appears to understand.  Recommend follow-up as an outpatient once recovered recall during this admission should the patient desire to discuss further.        Electronically signed by Jakob Vickers MD, 08/12/23, 1:12 PM EDT.    Electronically signed by Jakob Vickers MD at 08/12/23 9740

## 2023-08-12 NOTE — CONSULTS
[facility]   VASCULAR SURGERY CONSULT    Patient Name: Jayashree Longoria  : 1929  MRN: [id number]  Primary Care Physician:  Yaakov, No Known  Date of admission: 2023    Subjective   Subjective     Chief Complaint: Abdominal aorta aneurysm    HPI:    Jayashree Longoria is a 89 y.o. female who was found at home on the floor by a friend.  The patient said she had fallen and was not sure why.  She was brought to the hospital for further evaluation.  She was found to be in acute kidney injury.  She was also as part of her evaluation identified to have an abdominal aorta aneurysm.  I have been asked to evaluate from the vascular standpoint.  She had no prior knowledge of the aneurysm.    Review of Systems    Noncontributory except for the history of present illness.    Personal History     Past Medical History:   Diagnosis Date    Diabetes mellitus     Disease of thyroid gland     Hyperlipidemia     Hypertension     Renal disorder        History reviewed. No pertinent surgical history.    Family History: family history is not on file. Otherwise pertinent FHx was reviewed and not pertinent to current issue.    Social History:  reports that she has never smoked. She has never used smokeless tobacco. She reports that she does not drink alcohol and does not use drugs.    Home Medications:  atenolol, furosemide, levothyroxine, metFORMIN, montelukast, potassium chloride, and simvastatin      Allergies:  No Known Allergies    Objective   Objective     Vitals:   Temp:  [96 øF (35.6 øC)-97.9 øF (36.6 øC)] 97.5 øF (36.4 øC)  Heart Rate:  [73-91] 91  Resp:  [18] 18  BP: (111-147)/() 137/83    Physical Exam    General: Awake, alert, NAD   Eyes:  JOSAFAT   Neck: Supple   Lungs: Clear   Heart: RRR   Abdomen: benign   Musculoskeletal:  normal motor tone, symmetric   Skin: warm, normal turgor   Neuro: strength 5/5 all extremities      Diagnostic studies: A CT scan of the abdomen and pelvis without contrast dated  8/11/2023 has been reviewed.  An infrarenal abdominal aorta aneurysm with a maximum diameter on my review of 7.1 cm is identified.    Assessment & Plan   Assessment / Plan     Active Hospital Problems:  Active Hospital Problems    Diagnosis     **HANNAH (acute kidney injury)     Hypothermia        Assessment/plan:   Mrs. Longoria has a large infrarenal abdominal aortic aneurysm.  I have discussed with her the presence of the aneurysm as well as the concern for rupture.  I discussed with her management options to include surgical intervention versus no intervention.  At this time she would like to hold off on any intervention.  She does not feel well and does not want to have anything done at this time.  She would like to think about it and will discuss at a later time.  I explained to her that if the aneurysm ruptures the chances of survival are small.  She appears to understand.  Recommend follow-up as an outpatient once recovered recall during this admission should the patient desire to discuss further.        Electronically signed by Jakob Vickers MD, 08/12/23, 1:12 PM EDT.

## 2023-08-12 NOTE — PLAN OF CARE
Goal Outcome Evaluation:   Patient new admit from the ED to 4nt this shift. Patient alert and disoriented to time and intermittently situation. Very hard of hearing. Patient's rectal temp was 96.2 degrees F upon arriving to 4nt, PHILIP CORTEZ notified- instructed to increase room temp and apply warm blankets to patient and recheck temp in an hour. Temp 96.0 degrees F an hour later, PHILIP CORTEZ notfied, transferred patient to PCU.

## 2023-08-12 NOTE — PLAN OF CARE
Goal Outcome Evaluation: pt alert, [place].  Recognizes visitors, but often confused to time, place and situation.  Temperature back to normal.  Drinking water, but refusing to eat meals.  Very unsteady on feet, continent.  Vss. Will monitor.

## 2023-08-13 LAB
ANION GAP SERPL CALCULATED.3IONS-SCNC: 12.8 MMOL/L (ref 5–15)
BUN SERPL-MCNC: 48 MG/DL (ref 8–23)
BUN/CREAT SERPL: 26.4 (ref 7–25)
CALCIUM SPEC-SCNC: 9.4 MG/DL (ref 8.2–9.6)
CHLORIDE SERPL-SCNC: 107 MMOL/L (ref 98–107)
CO2 SERPL-SCNC: 15.2 MMOL/L (ref 22–29)
CREAT SERPL-MCNC: 1.82 MG/DL (ref 0.57–1)
DEPRECATED RDW RBC AUTO: 55.9 FL (ref 37–54)
EGFRCR SERPLBLD CKD-EPI 2021: 25.7 ML/MIN/1.73
ERYTHROCYTE [DISTWIDTH] IN BLOOD BY AUTOMATED COUNT: 19.1 % (ref 12.3–15.4)
GLUCOSE BLDC GLUCOMTR-MCNC: 103 MG/DL (ref 70–99)
GLUCOSE BLDC GLUCOMTR-MCNC: 105 MG/DL (ref 70–99)
GLUCOSE BLDC GLUCOMTR-MCNC: 95 MG/DL (ref 70–99)
GLUCOSE SERPL-MCNC: 134 MG/DL (ref 65–99)
HCT VFR BLD AUTO: 34.6 % (ref 34–46.6)
HGB BLD-MCNC: 11.5 G/DL (ref 12–15.9)
MAGNESIUM SERPL-MCNC: 2.3 MG/DL (ref 1.7–2.3)
MCH RBC QN AUTO: 31 PG (ref 26.6–33)
MCHC RBC AUTO-ENTMCNC: 33.2 G/DL (ref 31.5–35.7)
MCV RBC AUTO: 93.3 FL (ref 79–97)
PLATELET # BLD AUTO: 221 10*3/MM3 (ref 140–450)
PMV BLD AUTO: 9 FL (ref 6–12)
POTASSIUM SERPL-SCNC: 3.7 MMOL/L (ref 3.5–5.2)
RBC # BLD AUTO: 3.71 10*6/MM3 (ref 3.77–5.28)
SODIUM SERPL-SCNC: 135 MMOL/L (ref 136–145)
WBC NRBC COR # BLD: 17.64 10*3/MM3 (ref 3.4–10.8)

## 2023-08-13 PROCEDURE — 99232 SBSQ HOSP IP/OBS MODERATE 35: CPT | Performed by: INTERNAL MEDICINE

## 2023-08-13 PROCEDURE — 82948 REAGENT STRIP/BLOOD GLUCOSE: CPT

## 2023-08-13 PROCEDURE — 85027 COMPLETE CBC AUTOMATED: CPT | Performed by: INTERNAL MEDICINE

## 2023-08-13 PROCEDURE — 80048 BASIC METABOLIC PNL TOTAL CA: CPT | Performed by: INTERNAL MEDICINE

## 2023-08-13 PROCEDURE — 83735 ASSAY OF MAGNESIUM: CPT | Performed by: INTERNAL MEDICINE

## 2023-08-13 RX ORDER — POTASSIUM CHLORIDE 750 MG/1
40 CAPSULE, EXTENDED RELEASE ORAL ONCE
Status: COMPLETED | OUTPATIENT
Start: 2023-08-13 | End: 2023-08-13

## 2023-08-13 RX ORDER — FAMOTIDINE 10 MG
10 TABLET ORAL DAILY
Status: DISCONTINUED | OUTPATIENT
Start: 2023-08-13 | End: 2023-08-24 | Stop reason: HOSPADM

## 2023-08-13 RX ADMIN — SENNOSIDES AND DOCUSATE SODIUM 2 TABLET: 50; 8.6 TABLET ORAL at 20:59

## 2023-08-13 RX ADMIN — NYSTATIN: 100000 POWDER TOPICAL at 08:39

## 2023-08-13 RX ADMIN — NYSTATIN: 100000 POWDER TOPICAL at 20:58

## 2023-08-13 RX ADMIN — POTASSIUM CHLORIDE 40 MEQ: 10 CAPSULE, COATED, EXTENDED RELEASE ORAL at 10:40

## 2023-08-13 RX ADMIN — LEVOTHYROXINE SODIUM 100 MCG: 0.1 TABLET ORAL at 06:06

## 2023-08-13 RX ADMIN — FAMOTIDINE 10 MG: 10 TABLET ORAL at 10:40

## 2023-08-13 RX ADMIN — ACETAMINOPHEN 650 MG: 325 TABLET ORAL at 04:13

## 2023-08-13 NOTE — PLAN OF CARE
Goal Outcome Evaluation:   VSS. Pt is more alert tonight and had appetite.  Up in chair today.

## 2023-08-13 NOTE — PLAN OF CARE
Goal Outcome Evaluation:pt yelling out wanting to go down fraire for a minute to get papers, insistent and argument.  Food intake better today, eating 10-15% of meals.  Continent of urine.  Tried to get oob multiple times today. Vss. Will monitor.

## 2023-08-13 NOTE — PROGRESS NOTES
[facility]   Hospitalist Progress Note  Date: 2023  Patient Name: Jayashree Longoria  : 1929  MRN: [id number]  Date of admission: 2023      Subjective   Subjective     Chief Complaint: Fall and found down     Summary:   89 y.o. female with a past medical history of  type 2 diabetes mellitus, hypothyroidism, hyperlipidemia presented to the ED for evaluation of low back pain.  Patient was found to be on the floor by a friend who regularly checks on her.  Patient said that she has fell yesterday while walking.  Unsure why she fell.  Denies any lightheadedness, palpitations prior to the fall.  Unsure if she hit her head or not.  Was not able to get up and was lying on the floor.  Patient states that she has not seen a doctor in the last few years.  Positive for lower back pain.  Denies any fevers, chills, abdominal pain, nausea, vomiting, focal weakness, numbness, tingling.     In the ED, vital signs temperature 97.9, pulse 82, respiratory 18, blood pressure 117/79 on room air saturating around 94%.  Labs showed initial troponin 63, sodium 136, potassium 3.5, bicarb 17.1, creatinine 2.21, 3 years ago was 1.3, magnesium 2.7, rest of the CMP within normal limits, WBC 14.17, normal hemoglobin, normal platelets, urinalysis does not look infected, chest x-ray did not show any acute cardiopulmonary process.  X-ray of the lumbar spine showed compression fraction of the L1 of indeterminate age.  Large left paravertebral mass with peripheral calvarial calcifications may represent an abdominal aortic aneurysm.  CT head without contrast did not show any acute intracranial abnormality.  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  No acute retroperitoneal hemorrhage is seen to suggest aneurysm leakage or rupture.  Vertebral compression fracture at the T11 and L1 is noted.  Small bilateral pleural effusions are noted.  Case has been discussed by the ED  physician with vascular surgeon on-call Dr. Vickers, will be evaluating the patient in the morning and discuss regarding the management options for abdominal aortic aneurysm.  Received IV fluids in the ED.  Patient has been admitted for further evaluation and management of abdominal aortic aneurysm, HANNAH, dehydration.       Interval Followup:   Patient remains confused.  She is wanting to go home   She denies any specific complaints   Cr better  Potassium better     Review of Systems   Unable to obtain due to mental status     Objective   Objective     Vitals:   Temp:  [97.2 øF (36.2 øC)-98.4 øF (36.9 øC)] 98.4 øF (36.9 øC)  Heart Rate:  [89-98] 89  Resp:  [18-19] 18  BP: (106-137)/(64-86) 129/72  Physical Exam    Constitutional: Awake, alert, no acute distress resting in bedside chair    Eyes: Pupils equal, sclerae anicteric, no conjunctival injection   HENT: NCAT, mucous membranes moist   Neck: Supple, no thyromegaly, no lymphadenopathy, trachea midline   Respiratory: Clear to auscultation bilaterally, nonlabored respirations    Cardiovascular: RRR, no murmurs, rubs, or gallops, palpable pedal pulses bilaterally   Gastrointestinal: Positive bowel sounds, soft, nontender, nondistended   Musculoskeletal: No bilateral ankle edema, no clubbing or cyanosis to extremities   Psychiatric: Appropriate affect, cooperative   Neurologic: Oriented x 1 strength symmetric in all extremities, Cranial Nerves grossly intact to confrontation, speech clear   Skin: No rashes     Result Review    Result Review:  I have personally reviewed the results from the time of this admission to 8/13/2023 10:58 EDT and agree with these findings:  [x]  Laboratory  CBC          8/11/2023    18:06 8/12/2023    01:16   CBC   WBC 14.17  14.99    RBC 4.25  4.05    Hemoglobin 13.2  12.7    Hematocrit 39.0  37.6    MCV 91.8  92.8    MCH 31.1  31.4    MCHC 33.8  33.8    RDW 18.0  18.5    Platelets 238  225      BMP          8/11/2023    18:06 8/12/2023     01:16   BMP   BUN 59  54    Creatinine 2.21  2.07    Sodium 136  139    Potassium 3.5  2.7    Chloride 106  108    CO2 17.1  17.2    Calcium 9.6  9.5        [x]  Microbiology  No results found for: ACANTHNAEG, AFBCX, BPERTUSSISCX, BLOODCX  No results found for: BCIDPCR, CXREFLEX, CSFCX, CULTURETIS  No results found for: CULTURES, HSVCX, URCX  No results found for: EYECULTURE, GCCX, HSVCULTURE, LABHSV  No results found for: LEGIONELLA, MRSACX, MUMPSCX, MYCOPLASCX  No results found for: NOCARDIACX, STOOLCX  No results found for: THROATCX, UNSTIMCULT, URINECX, CULTURE, VZVCULTUR  No results found for: VIRALCULTU, WOUNDCX    [x]  Radiology  XR Chest 1 View    Result Date: 8/11/2023  PROCEDURE: XR CHEST 1 VW  COMPARISON: [facility], CR, CHEST PA/AP & LAT 2V, 11/06/2010, 21:59.  INDICATIONS: Weak/Dizzy/AMS triage protocol/GENERALIZED WEAKNESS, FALL  FINDINGS:  The heart is normal in size.  The lungs are well-expanded and free of infiltrates.  Healed fracture of the lateral left 6th rib is evident.      Impression:   No active cardiopulmonary disease is seen.       HERNANDEZ TILLEY MD       Electronically Signed and Approved By: HERNANDEZ TILLEY MD on 8/11/2023 at 18:54            CT Abdomen Pelvis Without Contrast    Result Date: 8/11/2023  PROCEDURE: CT ABDOMEN PELVIS WO CONTRAST  COMPARISON: 8/11/2023.  INDICATIONS: Aneurysm on x-ray with back pain; generalized lower back pain.  TECHNIQUE: 728 CT images were created without intravenous contrast.   PROTOCOL:   Standard CT imaging protocol performed.    RADIATION:   Total DLP: 599.5 mGy*cm.   Automated exposure control was utilized to minimize radiation dose.  FINDINGS: There is a large (7.6 cm) infrarenal abdominal aortic aneurysm, probably fusiform in configuration.  The aneurysm neck is roughly estimated at 3 cm, as on image 106 of series 202.  The fusiform aneurysm extends is about 9.4 cm caudally to just above the aortic bifurcation.  No acute  retroperitoneal or intraperitoneal hemorrhage is seen to suggest aneurysm leakage or rupture.  There may be mild fusiform dilatation of the bilateral common iliac arteries.  The left common iliac artery measures about 1.6 cm in greatest diameter.  The right common iliac artery measures about 1.5 cm in greatest diameter.  The proximal left internal iliac artery measures about 1.4 cm in greatest diameter.  The proximal right internal iliac artery measures about 1.1 cm in greatest diameter.  Extensive atherosclerotic changes are seen.  For instance, extensive arterial calcification involves the proximal bilateral renal arteries.  Minimal calcified plaque is seen at the origins of the celiac trunk and superior mesenteric artery.  The inferior mesenteric artery is not well evaluated by this study; it appears to arise from the lower left anterior margin of the large fusiform aneurysm.   Age-indeterminate vertebral compression fractures are seen at T11 and L1, estimated at 30 percent or less in degree.  Vacuum phenomenon involves the disc interspaces at T11-12, T12-L1, L1-2, L2-3, L3-4, and L4-5.  Severe degenerative changes are seen throughout the imaged spine.  There is suspected levoscoliosis of the lumbar spine.  Chronic malalignment (at several levels) is seen throughout the lumbar spine.   Additionally, colonic diverticulosis is seen without acute diverticulitis.  No acute appendicitis.  No mechanical bowel obstruction.  No pneumoperitoneum or pneumatosis.  The patient has undergone cholecystectomy.  No acute pancreatitis.  Small to moderate sized hiatal hernia is seen.  There are coronary artery calcifications.  No cardiac enlargement.  A trace amount of pericardial effusion is seen.  There may be tiny bilateral pleural effusions.  Motion artifact obscures detail.  Mild prominence of the adrenal glands is seen, which is nonspecific.  No definite hydronephrosis, nephrolithiasis, or ureterolithiasis.  There are  arterial calcifications in close proximity to the distal ureters.  It appears that the patient has undergone hysterectomy.  Degenerative changes involve the bilateral hip joints and the bilateral sacroiliac joints.  There is mild distension of the urinary bladder.      Impression:    1. There is a 7.6 cm fusiform aneurysm of the infrarenal abdominal aorta, as discussed.  No acute retroperitoneal hemorrhage is seen to suggest aneurysm leakage or rupture.   2. Age-indeterminate (but possibly subacute) vertebral compression fractures are seen at T11 and L1, estimated at 30 percent or less in degree.  There is minimal if any posterior wall retropulsion associated with these findings.   3. There are small bilateral pleural effusions.   4. The patient has undergone hysterectomy.   5. Probably no significant acute findings are seen otherwise.   6. Please see above comments for further detail.     A preliminary report (regarding the abdominal aortic aneurysm, AAA) was given to Dr. Moya (ordering/attending [facility] ED Physician) at approximately 2205 hours on 8/11/2023.   Please note that portions of this note were completed with a voice recognition program.  VALERIA FRIEDMAN JR, MD       Electronically Signed and Approved By: VALERIA FRIEDMAN JR, MD on 8/11/2023 at 22:18                 []  EKG/Telemetry   []  Cardiology/Vascular   []  Pathology  []  Old records  []  Other:    Assessment & Plan   Assessment / Plan     Assessment/Plan:  DM Type 2  Hypothyroid--uncontrolled  Suspect Dementia  HLD  Fall at home   7.6 aneurysm of the infrarenal abdominal aorta  vertebral compression fractures are seen at T11 and L1  Hypothermia 96.1    PLAN  --Temperature back up to 97.7  --PT/OT  -- Vascular surgery consulted for aneurysm. Patient declines at the time  --Continue  increased dose of her Synthroid as her thyroid studies are abnormal  --Continue sliding scale insulin for diabetes  --palliative care consult for long-term goals of  care  --Cr is improving   --Potassium back to normal      Discussed plan with RN.    DVT prophylaxis:  No DVT prophylaxis order currently exists.    CODE STATUS:   Medical Intervention Limits: NO intubation (DNI)  Level Of Support Discussed With: Patient  Code Status (Patient has no pulse and is not breathing): No CPR (Do Not Attempt to Resuscitate)  Medical Interventions (Patient has pulse or is breathing): Limited Support

## 2023-08-14 LAB
ANION GAP SERPL CALCULATED.3IONS-SCNC: 10.6 MMOL/L (ref 5–15)
BUN SERPL-MCNC: 41 MG/DL (ref 8–23)
BUN/CREAT SERPL: 23.4 (ref 7–25)
CALCIUM SPEC-SCNC: 8.8 MG/DL (ref 8.2–9.6)
CHLORIDE SERPL-SCNC: 110 MMOL/L (ref 98–107)
CO2 SERPL-SCNC: 17.4 MMOL/L (ref 22–29)
CREAT SERPL-MCNC: 1.75 MG/DL (ref 0.57–1)
DEPRECATED RDW RBC AUTO: 59.3 FL (ref 37–54)
EGFRCR SERPLBLD CKD-EPI 2021: 26.9 ML/MIN/1.73
ERYTHROCYTE [DISTWIDTH] IN BLOOD BY AUTOMATED COUNT: 19.3 % (ref 12.3–15.4)
GLUCOSE BLDC GLUCOMTR-MCNC: 105 MG/DL (ref 70–99)
GLUCOSE BLDC GLUCOMTR-MCNC: 53 MG/DL (ref 70–99)
GLUCOSE BLDC GLUCOMTR-MCNC: 73 MG/DL (ref 70–99)
GLUCOSE BLDC GLUCOMTR-MCNC: 83 MG/DL (ref 70–99)
GLUCOSE BLDC GLUCOMTR-MCNC: 85 MG/DL (ref 70–99)
GLUCOSE SERPL-MCNC: 89 MG/DL (ref 65–99)
HCT VFR BLD AUTO: 31.4 % (ref 34–46.6)
HGB BLD-MCNC: 10.4 G/DL (ref 12–15.9)
MAGNESIUM SERPL-MCNC: 2.2 MG/DL (ref 1.7–2.3)
MCH RBC QN AUTO: 31.4 PG (ref 26.6–33)
MCHC RBC AUTO-ENTMCNC: 33.1 G/DL (ref 31.5–35.7)
MCV RBC AUTO: 94.9 FL (ref 79–97)
PLATELET # BLD AUTO: 207 10*3/MM3 (ref 140–450)
PMV BLD AUTO: 9 FL (ref 6–12)
POTASSIUM SERPL-SCNC: 4.5 MMOL/L (ref 3.5–5.2)
RBC # BLD AUTO: 3.31 10*6/MM3 (ref 3.77–5.28)
SODIUM SERPL-SCNC: 138 MMOL/L (ref 136–145)
WBC NRBC COR # BLD: 13.64 10*3/MM3 (ref 3.4–10.8)

## 2023-08-14 PROCEDURE — 80048 BASIC METABOLIC PNL TOTAL CA: CPT | Performed by: INTERNAL MEDICINE

## 2023-08-14 PROCEDURE — 82948 REAGENT STRIP/BLOOD GLUCOSE: CPT

## 2023-08-14 PROCEDURE — 99232 SBSQ HOSP IP/OBS MODERATE 35: CPT | Performed by: INTERNAL MEDICINE

## 2023-08-14 PROCEDURE — 85027 COMPLETE CBC AUTOMATED: CPT | Performed by: INTERNAL MEDICINE

## 2023-08-14 PROCEDURE — 83735 ASSAY OF MAGNESIUM: CPT | Performed by: INTERNAL MEDICINE

## 2023-08-14 RX ADMIN — LEVOTHYROXINE SODIUM 100 MCG: 0.1 TABLET ORAL at 06:04

## 2023-08-14 RX ADMIN — SENNOSIDES AND DOCUSATE SODIUM 2 TABLET: 50; 8.6 TABLET ORAL at 20:33

## 2023-08-14 RX ADMIN — WHITE PETROLATUM 1 APPLICATION: 1.75 OINTMENT TOPICAL at 23:10

## 2023-08-14 RX ADMIN — NYSTATIN: 100000 POWDER TOPICAL at 20:33

## 2023-08-14 RX ADMIN — NYSTATIN: 100000 POWDER TOPICAL at 08:56

## 2023-08-14 RX ADMIN — FAMOTIDINE 10 MG: 10 TABLET ORAL at 08:56

## 2023-08-14 NOTE — CONSULTS
Patient's nephew, Romain Lagunas has faxed patient's current living will to hospital.   Information will be placed in Epic system.

## 2023-08-14 NOTE — PLAN OF CARE
Goal Outcome Evaluation:      Patient asleep throughout shift. Unable to answer questions or follow commands. Patient is not eating or drinking.       Brianne Shine RN

## 2023-08-14 NOTE — CONSULTS
Purpose of the visit was to evaluate for: goals of care/advanced care planning. Spoke with MD, RN, patient, and [facility] and discussed palliative care, goals of care, resuscitation status, and [facility].      Assessment:  Patient is currently located on PCU, palliative care nurse collaborated with primary RN, ALMA and MD regarding patient's current condition.     Patient lives home alone, she had a fall at home and unable to get up on her own. She has a friend that checks on her regularly.  RN reported patient has not been seen by a physician in several years due to fear of being placed in a nursing home. She has compression fractures of LI and T11 vertebrae and an abdominal aortic aneurysm measuring 7.5 cm.    Patient has been confused, asking to go home per RN and refusing to eat or drink.  Vascular Dr. Vickers documented patient did not want any intervention for the aneurysm.  Hospice has been recommended by Dr. Villarreal, vascular surgeon.      Called patient's nephew Romain, introduced palliative care and explained my role.  He lives in [place] and his wife is faxing the living will to the hospital for patient's records.  We discussed patient's current condition, confirmed code status and explained the recommendation for hospice.  Romain reported that he does not want any type of aggressive surgeries including PEG placement if patient continues to refuse to eat.  He is agreeable to a coretrak if indicated.  He stated he was not sure he was ready for hospice at this time.  He requested a PT evaluation and referral for inpatient rehab at [facility].  Explained the discharge process and referrals would need to be made and a bed offered.  We discussed hospice at home as an option and he indicated they have attempted to find care givers for her to be able to remain at home and have been unsuccessful.  He felt she has the finances to pay privately for facility placement if necessary but he is not her financial POA.  He  is going to reach out to her  to determine if she has appointed a financial POA.      Collaborated with Dr. Mitchell and ALMA Gee regarding Romain's wishes.  EMS DNR was emailed to Romain to sign and send back for transport.  Palliative care contact numbers were provided.     Recommendations/Plan: Inpatient rehab referrals made by .    Tasks Completed: EMS DNR, Code Status clarification, and Emotional Support.        Natalya RAMÍREZ, RN, CHPN

## 2023-08-14 NOTE — PROGRESS NOTES
[facility]   Hospitalist Progress Note  Date: 2023  Patient Name: Jayashree Longoria  : 1929  MRN: [id number]  Date of admission: 2023      Subjective   Subjective     Chief Complaint: Fall and found down     Summary:   89 y.o. female with a past medical history of  type 2 diabetes mellitus, hypothyroidism, hyperlipidemia presented to the ED for evaluation of low back pain.  Patient was found to be on the floor by a friend who regularly checks on her.  Patient said that she has fell yesterday while walking.  Unsure why she fell.  Denies any lightheadedness, palpitations prior to the fall.  Unsure if she hit her head or not.  Was not able to get up and was lying on the floor.  Patient states that she has not seen a doctor in the last few years.  Positive for lower back pain.  Denies any fevers, chills, abdominal pain, nausea, vomiting, focal weakness, numbness, tingling.     In the ED, vital signs temperature 97.9, pulse 82, respiratory 18, blood pressure 117/79 on room air saturating around 94%.  Labs showed initial troponin 63, sodium 136, potassium 3.5, bicarb 17.1, creatinine 2.21, 3 years ago was 1.3, magnesium 2.7, rest of the CMP within normal limits, WBC 14.17, normal hemoglobin, normal platelets, urinalysis does not look infected, chest x-ray did not show any acute cardiopulmonary process.  X-ray of the lumbar spine showed compression fraction of the L1 of indeterminate age.  Large left paravertebral mass with peripheral calvarial calcifications may represent an abdominal aortic aneurysm.  CT head without contrast did not show any acute intracranial abnormality.  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  No acute retroperitoneal hemorrhage is seen to suggest aneurysm leakage or rupture.  Vertebral compression fracture at the T11 and L1 is noted.  Small bilateral pleural effusions are noted.  Case has been discussed by the ED  physician with vascular surgeon on-call Dr. Vickers, will be evaluating the patient in the morning and discuss regarding the management options for abdominal aortic aneurysm.  Received IV fluids in the ED.  Patient has been admitted for further evaluation and management of abdominal aortic aneurysm, HANNAH, dehydration.       Interval Followup:   Patient remains confused.  She is wanting to go home   She denies any specific complaints   Cr better  Potassium better   Awaiting palliative care evaluation     Review of Systems   Unable to obtain due to mental status     Objective   Objective     Vitals:   Temp:  [97.4 øF (36.3 øC)-98.1 øF (36.7 øC)] 97.7 øF (36.5 øC)  Heart Rate:  [66-90] 86  Resp:  [16-19] 16  BP: (114-132)/(55-80) 127/56  Physical Exam    Constitutional: Resting in bed sleeping    Eyes: Pupils equal, sclerae anicteric, no conjunctival injection   HENT: NCAT, mucous membranes moist   Neck: Supple, no thyromegaly, no lymphadenopathy, trachea midline   Respiratory: Clear to auscultation bilaterally, nonlabored respirations    Cardiovascular: RRR, no murmurs, rubs, or gallops, palpable pedal pulses bilaterally   Gastrointestinal: Positive bowel sounds, soft, nontender, nondistended   Musculoskeletal: No bilateral ankle edema, no clubbing or cyanosis to extremities   Psychiatric: Appropriate affect, cooperative   Neurologic: Oriented x 1 strength symmetric in all extremities, Cranial Nerves grossly intact to confrontation, speech clear   Skin: No rashes     Result Review    Result Review:  I have personally reviewed the results from the time of this admission to 8/14/2023 10:25 EDT and agree with these findings:  [x]  Laboratory  CBC          8/12/2023    01:16 8/13/2023    11:01 8/14/2023    04:02   CBC   WBC 14.99  17.64  13.64    RBC 4.05  3.71  3.31    Hemoglobin 12.7  11.5  10.4    Hematocrit 37.6  34.6  31.4    MCV 92.8  93.3  94.9    MCH 31.4  31.0  31.4    MCHC 33.8  33.2  33.1    RDW 18.5  19.1  19.3     Platelets 225  221  207      BMP          8/12/2023    01:16 8/13/2023    11:01 8/14/2023    04:02   BMP   BUN 54  48  41    Creatinine 2.07  1.82  1.75    Sodium 139  135  138    Potassium 2.7  3.7  4.5    Chloride 108  107  110    CO2 17.2  15.2  17.4    Calcium 9.5  9.4  8.8        [x]  Microbiology  No results found for: ACANTHNAEG, AFBCX, BPERTUSSISCX, BLOODCX  No results found for: BCIDPCR, CXREFLEX, CSFCX, CULTURETIS  No results found for: CULTURES, HSVCX, URCX  No results found for: EYECULTURE, GCCX, HSVCULTURE, LABHSV  No results found for: LEGIONELLA, MRSACX, MUMPSCX, MYCOPLASCX  No results found for: NOCARDIACX, STOOLCX  No results found for: THROATCX, UNSTIMCULT, URINECX, CULTURE, VZVCULTUR  No results found for: VIRALCULTU, WOUNDCX    [x]  Radiology  XR Chest 1 View    Result Date: 8/11/2023  PROCEDURE: XR CHEST 1 VW  COMPARISON: [facility], CR, CHEST PA/AP & LAT 2V, 11/06/2010, 21:59.  INDICATIONS: Weak/Dizzy/AMS triage protocol/GENERALIZED WEAKNESS, FALL  FINDINGS:  The heart is normal in size.  The lungs are well-expanded and free of infiltrates.  Healed fracture of the lateral left 6th rib is evident.      Impression:   No active cardiopulmonary disease is seen.       HERNANDEZ TILLEY MD       Electronically Signed and Approved By: HERNANDEZ TILLEY MD on 8/11/2023 at 18:54            CT Abdomen Pelvis Without Contrast    Result Date: 8/11/2023  PROCEDURE: CT ABDOMEN PELVIS WO CONTRAST  COMPARISON: 8/11/2023.  INDICATIONS: Aneurysm on x-ray with back pain; generalized lower back pain.  TECHNIQUE: 728 CT images were created without intravenous contrast.   PROTOCOL:   Standard CT imaging protocol performed.    RADIATION:   Total DLP: 599.5 mGy*cm.   Automated exposure control was utilized to minimize radiation dose.  FINDINGS: There is a large (7.6 cm) infrarenal abdominal aortic aneurysm, probably fusiform in configuration.  The aneurysm neck is roughly estimated at 3 cm, as on image 106  of series 202.  The fusiform aneurysm extends is about 9.4 cm caudally to just above the aortic bifurcation.  No acute retroperitoneal or intraperitoneal hemorrhage is seen to suggest aneurysm leakage or rupture.  There may be mild fusiform dilatation of the bilateral common iliac arteries.  The left common iliac artery measures about 1.6 cm in greatest diameter.  The right common iliac artery measures about 1.5 cm in greatest diameter.  The proximal left internal iliac artery measures about 1.4 cm in greatest diameter.  The proximal right internal iliac artery measures about 1.1 cm in greatest diameter.  Extensive atherosclerotic changes are seen.  For instance, extensive arterial calcification involves the proximal bilateral renal arteries.  Minimal calcified plaque is seen at the origins of the celiac trunk and superior mesenteric artery.  The inferior mesenteric artery is not well evaluated by this study; it appears to arise from the lower left anterior margin of the large fusiform aneurysm.   Age-indeterminate vertebral compression fractures are seen at T11 and L1, estimated at 30 percent or less in degree.  Vacuum phenomenon involves the disc interspaces at T11-12, T12-L1, L1-2, L2-3, L3-4, and L4-5.  Severe degenerative changes are seen throughout the imaged spine.  There is suspected levoscoliosis of the lumbar spine.  Chronic malalignment (at several levels) is seen throughout the lumbar spine.   Additionally, colonic diverticulosis is seen without acute diverticulitis.  No acute appendicitis.  No mechanical bowel obstruction.  No pneumoperitoneum or pneumatosis.  The patient has undergone cholecystectomy.  No acute pancreatitis.  Small to moderate sized hiatal hernia is seen.  There are coronary artery calcifications.  No cardiac enlargement.  A trace amount of pericardial effusion is seen.  There may be tiny bilateral pleural effusions.  Motion artifact obscures detail.  Mild prominence of the adrenal  glands is seen, which is nonspecific.  No definite hydronephrosis, nephrolithiasis, or ureterolithiasis.  There are arterial calcifications in close proximity to the distal ureters.  It appears that the patient has undergone hysterectomy.  Degenerative changes involve the bilateral hip joints and the bilateral sacroiliac joints.  There is mild distension of the urinary bladder.      Impression:    1. There is a 7.6 cm fusiform aneurysm of the infrarenal abdominal aorta, as discussed.  No acute retroperitoneal hemorrhage is seen to suggest aneurysm leakage or rupture.   2. Age-indeterminate (but possibly subacute) vertebral compression fractures are seen at T11 and L1, estimated at 30 percent or less in degree.  There is minimal if any posterior wall retropulsion associated with these findings.   3. There are small bilateral pleural effusions.   4. The patient has undergone hysterectomy.   5. Probably no significant acute findings are seen otherwise.   6. Please see above comments for further detail.     A preliminary report (regarding the abdominal aortic aneurysm, AAA) was given to Dr. Moya (ordering/attending [facility] ED Physician) at approximately 2205 hours on 8/11/2023.   Please note that portions of this note were completed with a voice recognition program.  VALERIA FRIEDMAN JR, MD       Electronically Signed and Approved By: VALERIA FRIEDMAN JR, MD on 8/11/2023 at 22:18                 []  EKG/Telemetry   []  Cardiology/Vascular   []  Pathology  []  Old records  []  Other:    Assessment & Plan   Assessment / Plan     Assessment/Plan:  DM Type 2  Hypothyroid--uncontrolled  Suspect Dementia  HLD  Fall at home   7.6 aneurysm of the infrarenal abdominal aorta  vertebral compression fractures are seen at T11 and L1  Hypothermia 96.1    PLAN  --Temperature back up to normal   --PT/OT  -- Vascular surgery consulted for aneurysm. Patient declines at the time and she is not a good operative candidate due to advanced age    --Continue  increased dose of her Synthroid as her thyroid studies are abnormal  --Continue sliding scale insulin for diabetes  --palliative care consult for long-term goals of care. Nephew has faxed living will. She does not appear to have any immediate family   --Cr is improving   --Potassium back to normal      Discussed plan with RN.    DVT prophylaxis:  No DVT prophylaxis order currently exists.    CODE STATUS:   Medical Intervention Limits: NO intubation (DNI)  Level Of Support Discussed With: Patient  Code Status (Patient has no pulse and is not breathing): No CPR (Do Not Attempt to Resuscitate)  Medical Interventions (Patient has pulse or is breathing): Limited Support

## 2023-08-14 NOTE — SIGNIFICANT NOTE
Wound Eval / Progress Noted    BROOKLYNN Pena     Patient Name: Jayashree Longoria  : 1929  MRN: [id number]  Today's Date: 2023                 Admit Date: 2023    Visit Dx:    ICD-10-CM ICD-9-CM   1. Fall, initial encounter  W19.XXXA E888.9   2. Dehydration  E86.0 276.51   3. HANNAH (acute kidney injury)  N17.9 584.9   4. Abdominal aortic aneurysm (AAA) without rupture, unspecified part  I71.40 441.4         HANNAH (acute kidney injury)    Hypothermia        Past Medical History:   Diagnosis Date    Diabetes mellitus     Disease of thyroid gland     Hyperlipidemia     Hypertension     Renal disorder         History reviewed. No pertinent surgical history.      Physical Assessment:     23 1030   Wound 23 Bilateral anterior abdomen MASD (Moisture associated skin damage)   Placement Date/Time: 23   Present on Hospital Admission: Yes  Side: Bilateral  Orientation: anterior  Location: abdomen  Primary Wound Type: MASD (Moisture associated skin damage)   Dressing Appearance open to air   Closure None   Base moist;red   Periwound moist;redness   Periwound Temperature warm   Periwound Skin Turgor soft   Edges open;rolled/closed   Drainage Characteristics/Odor serosanguineous   Drainage Amount small   Wound 23 Left posterior plantar   Placement Date/Time: 23   Side: Left  Orientation: posterior  Location: plantar   Dressing Appearance open to air   Closure None   Base dry;red;scab   Periwound dry;redness   Periwound Temperature warm   Periwound Skin Turgor firm   Edges callused;rolled/closed   Wound Length (cm) 3 cm   Wound Width (cm) 2 cm   Wound Surface Area (cm^2) 6 cm^2   Drainage Amount none   Wound 23 Bilateral posterior gluteal   Placement Date/Time: 23   Present on Hospital Admission: Yes  Side: Bilateral  Orientation: posterior  Location: gluteal   Pressure Injury Stage DTPI   Dressing Appearance no drainage   Base  non-blanchable;purple;red   Periwound blanchable;redness   Periwound Temperature warm   Periwound Skin Turgor soft   Edges rolled/closed   Wound Length (cm) 1 cm   Wound Width (cm) 0.8 cm   Wound Surface Area (cm^2) 0.8 cm^2   Drainage Amount none   Wound 08/12/23 0104 Right anterior foot Traumatic   Placement Date/Time: 08/12/23 0104   Present on Hospital Admission: Yes  Side: Right  Orientation: anterior  Location: foot  Primary Wound Type: Traumatic   Dressing Appearance no drainage;open to air   Base dry;pink;red   Periwound dry;redness   Periwound Temperature warm   Periwound Skin Turgor firm   Edges callused;open;rolled/closed   Wound Length (cm) 1 cm   Wound Width (cm) 0.8 cm   Wound Surface Area (cm^2) 0.8 cm^2   Drainage Amount none          Wound Check / Follow-up:  Patient seen today for wound consult. Patient with large deep creases with excoriation noted within skin creases especially to right side of abdomen and to left breast. Recommending skin protection, moisture prevention.   Patient also with MASD to bilateral gluteal aspects with blanchable redness. She has a small DTI noted to left gluteal aspect. Recommending skin protection, moisture prevention and pressure reduction.   Patient also with neuropathic ulcerations to left plantar foot and right lateral foot with thick callus formation. 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 Skin protection, moisture prevention, pressure reduction, Skin care / hygiene.     Juli Lechuga RN    8/14/2023    14:47 EDT

## 2023-08-14 NOTE — PLAN OF CARE
Goal Outcome Evaluation:  Pt was able to get some rest.  Did refuse blood glucose last night.

## 2023-08-15 LAB
ANION GAP SERPL CALCULATED.3IONS-SCNC: 9.7 MMOL/L (ref 5–15)
BUN SERPL-MCNC: 31 MG/DL (ref 8–23)
BUN/CREAT SERPL: 21.5 (ref 7–25)
CALCIUM SPEC-SCNC: 8.6 MG/DL (ref 8.2–9.6)
CHLORIDE SERPL-SCNC: 112 MMOL/L (ref 98–107)
CO2 SERPL-SCNC: 17.3 MMOL/L (ref 22–29)
CREAT SERPL-MCNC: 1.44 MG/DL (ref 0.57–1)
DEPRECATED RDW RBC AUTO: 62.2 FL (ref 37–54)
EGFRCR SERPLBLD CKD-EPI 2021: 34 ML/MIN/1.73
ERYTHROCYTE [DISTWIDTH] IN BLOOD BY AUTOMATED COUNT: 19.7 % (ref 12.3–15.4)
GLUCOSE BLDC GLUCOMTR-MCNC: 69 MG/DL (ref 70–99)
GLUCOSE BLDC GLUCOMTR-MCNC: 77 MG/DL (ref 70–99)
GLUCOSE BLDC GLUCOMTR-MCNC: 81 MG/DL (ref 70–99)
GLUCOSE BLDC GLUCOMTR-MCNC: 87 MG/DL (ref 70–99)
GLUCOSE SERPL-MCNC: 77 MG/DL (ref 65–99)
HCT VFR BLD AUTO: 33.8 % (ref 34–46.6)
HGB BLD-MCNC: 10.8 G/DL (ref 12–15.9)
MAGNESIUM SERPL-MCNC: 2.2 MG/DL (ref 1.7–2.3)
MCH RBC QN AUTO: 31.3 PG (ref 26.6–33)
MCHC RBC AUTO-ENTMCNC: 32 G/DL (ref 31.5–35.7)
MCV RBC AUTO: 98 FL (ref 79–97)
PLATELET # BLD AUTO: 207 10*3/MM3 (ref 140–450)
PMV BLD AUTO: 9 FL (ref 6–12)
POTASSIUM SERPL-SCNC: 4 MMOL/L (ref 3.5–5.2)
RBC # BLD AUTO: 3.45 10*6/MM3 (ref 3.77–5.28)
SODIUM SERPL-SCNC: 139 MMOL/L (ref 136–145)
WBC NRBC COR # BLD: 8.77 10*3/MM3 (ref 3.4–10.8)

## 2023-08-15 PROCEDURE — 82948 REAGENT STRIP/BLOOD GLUCOSE: CPT

## 2023-08-15 PROCEDURE — 83735 ASSAY OF MAGNESIUM: CPT | Performed by: INTERNAL MEDICINE

## 2023-08-15 PROCEDURE — 80048 BASIC METABOLIC PNL TOTAL CA: CPT | Performed by: INTERNAL MEDICINE

## 2023-08-15 PROCEDURE — 99232 SBSQ HOSP IP/OBS MODERATE 35: CPT | Performed by: INTERNAL MEDICINE

## 2023-08-15 PROCEDURE — 85027 COMPLETE CBC AUTOMATED: CPT | Performed by: INTERNAL MEDICINE

## 2023-08-15 PROCEDURE — 25010000002 GLUCAGON (RDNA) PER 1 MG: Performed by: STUDENT IN AN ORGANIZED HEALTH CARE EDUCATION/TRAINING PROGRAM

## 2023-08-15 RX ADMIN — GLUCAGON HYDROCHLORIDE 1 MG: KIT at 11:48

## 2023-08-15 RX ADMIN — SENNOSIDES AND DOCUSATE SODIUM 2 TABLET: 50; 8.6 TABLET ORAL at 20:26

## 2023-08-15 RX ADMIN — NYSTATIN: 100000 POWDER TOPICAL at 20:28

## 2023-08-15 RX ADMIN — WHITE PETROLATUM 1 APPLICATION: 1.75 OINTMENT TOPICAL at 22:49

## 2023-08-15 RX ADMIN — LEVOTHYROXINE SODIUM 100 MCG: 0.1 TABLET ORAL at 05:21

## 2023-08-15 RX ADMIN — NYSTATIN: 100000 POWDER TOPICAL at 09:04

## 2023-08-15 RX ADMIN — WHITE PETROLATUM 1 APPLICATION: 1.75 OINTMENT TOPICAL at 09:04

## 2023-08-15 NOTE — PROGRESS NOTES
[facility]   Hospitalist Progress Note  Date: 8/15/2023  Patient Name: Jayashree Longoria  : 1929  MRN: [id number]  Date of admission: 2023      Subjective   Subjective     Chief Complaint: Fall and found down     Summary:   89 y.o. female with a past medical history of  type 2 diabetes mellitus, hypothyroidism, hyperlipidemia presented to the ED for evaluation of low back pain.  Patient was found to be on the floor by a friend who regularly checks on her.  Patient said that she has fell yesterday while walking.  Unsure why she fell.  Denies any lightheadedness, palpitations prior to the fall.  Unsure if she hit her head or not.  Was not able to get up and was lying on the floor.  Patient states that she has not seen a doctor in the last few years.  Positive for lower back pain.  Denies any fevers, chills, abdominal pain, nausea, vomiting, focal weakness, numbness, tingling.     In the ED, vital signs temperature 97.9, pulse 82, respiratory 18, blood pressure 117/79 on room air saturating around 94%.  Labs showed initial troponin 63, sodium 136, potassium 3.5, bicarb 17.1, creatinine 2.21, 3 years ago was 1.3, magnesium 2.7, rest of the CMP within normal limits, WBC 14.17, normal hemoglobin, normal platelets, urinalysis does not look infected, chest x-ray did not show any acute cardiopulmonary process.  X-ray of the lumbar spine showed compression fraction of the L1 of indeterminate age.  Large left paravertebral mass with peripheral calvarial calcifications may represent an abdominal aortic aneurysm.  CT head without contrast did not show any acute intracranial abnormality.  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  No acute retroperitoneal hemorrhage is seen to suggest aneurysm leakage or rupture.  Vertebral compression fracture at the T11 and L1 is noted.  Small bilateral pleural effusions are noted.  Case has been discussed by the ED  physician with vascular surgeon on-call Dr. Vickers, will be evaluating the patient in the morning and discuss regarding the management options for abdominal aortic aneurysm.  Received IV fluids in the ED.  Patient has been admitted for further evaluation and management of abdominal aortic aneurysm, HANNAH, dehydration.       Interval Followup:   Denies new complaints, just wants some ice cream    Review of Systems   Unable to obtain due to mental status     Objective   Objective     Vitals:   Temp:  [97.6 øF (36.4 øC)-98.1 øF (36.7 øC)] 97.8 øF (36.6 øC)  Heart Rate:  [63-94] 78  Resp:  [16] 16  BP: (102-120)/(37-66) 112/62  Physical Exam    Constitutional:awake, alert   Eyes: Pupils equal, sclerae anicteric, no conjunctival injection   HENT: NCAT, mucous membranes moist   Neck: Supple, no thyromegaly, no lymphadenopathy, trachea midline   Respiratory: Clear to auscultation bilaterally, nonlabored respirations    Cardiovascular: RRR, no murmurs, rubs, or gallops, palpable pedal pulses bilaterally   Gastrointestinal: Positive bowel sounds, soft, nontender, nondistended   Musculoskeletal: No bilateral ankle edema, no clubbing or cyanosis to extremities   Psychiatric: Appropriate affect, cooperative   Neurologic: Oriented x 1; speech clear   Skin: No rashes     Result Review    Result Review:  I have reviewed the following:  [x]  Laboratory  CBC          8/13/2023    11:01 8/14/2023    04:02 8/15/2023    04:41   CBC   WBC 17.64  13.64  8.77    RBC 3.71  3.31  3.45    Hemoglobin 11.5  10.4  10.8    Hematocrit 34.6  31.4  33.8    MCV 93.3  94.9  98.0    MCH 31.0  31.4  31.3    MCHC 33.2  33.1  32.0    RDW 19.1  19.3  19.7    Platelets 221  207  207      BMP          8/13/2023    11:01 8/14/2023    04:02 8/15/2023    04:41   BMP   BUN 48  41  31    Creatinine 1.82  1.75  1.44    Sodium 135  138  139    Potassium 3.7  4.5  4.0    Chloride 107  110  112    CO2 15.2  17.4  17.3    Calcium 9.4  8.8  8.6        [x]   Microbiology  No results found for: ACANTHNAEG, AFBCX, BPERTUSSISCX, BLOODCX  No results found for: BCIDPCR, CXREFLEX, CSFCX, CULTURETIS  No results found for: CULTURES, HSVCX, URCX  No results found for: EYECULTURE, GCCX, HSVCULTURE, LABHSV  No results found for: LEGIONELLA, MRSACX, MUMPSCX, MYCOPLASCX  No results found for: NOCARDIACX, STOOLCX  No results found for: THROATCX, UNSTIMCULT, URINECX, CULTURE, VZVCULTUR  No results found for: VIRALCULTU, WOUNDCX    [x]  Radiology     CT Abdomen Pelvis Without Contrast    Result Date: 8/11/2023  PROCEDURE: CT ABDOMEN PELVIS WO CONTRAST  COMPARISON: 8/11/2023.  INDICATIONS: Aneurysm on x-ray with back pain; generalized lower back pain.  TECHNIQUE: 728 CT images were created without intravenous contrast.   PROTOCOL:   Standard CT imaging protocol performed.    RADIATION:   Total DLP: 599.5 mGy*cm.   Automated exposure control was utilized to minimize radiation dose.  FINDINGS: There is a large (7.6 cm) infrarenal abdominal aortic aneurysm, probably fusiform in configuration.  The aneurysm neck is roughly estimated at 3 cm, as on image 106 of series 202.  The fusiform aneurysm extends is about 9.4 cm caudally to just above the aortic bifurcation.  No acute retroperitoneal or intraperitoneal hemorrhage is seen to suggest aneurysm leakage or rupture.  There may be mild fusiform dilatation of the bilateral common iliac arteries.  The left common iliac artery measures about 1.6 cm in greatest diameter.  The right common iliac artery measures about 1.5 cm in greatest diameter.  The proximal left internal iliac artery measures about 1.4 cm in greatest diameter.  The proximal right internal iliac artery measures about 1.1 cm in greatest diameter.  Extensive atherosclerotic changes are seen.  For instance, extensive arterial calcification involves the proximal bilateral renal arteries.  Minimal calcified plaque is seen at the origins of the celiac trunk and superior mesenteric  artery.  The inferior mesenteric artery is not well evaluated by this study; it appears to arise from the lower left anterior margin of the large fusiform aneurysm.   Age-indeterminate vertebral compression fractures are seen at T11 and L1, estimated at 30 percent or less in degree.  Vacuum phenomenon involves the disc interspaces at T11-12, T12-L1, L1-2, L2-3, L3-4, and L4-5.  Severe degenerative changes are seen throughout the imaged spine.  There is suspected levoscoliosis of the lumbar spine.  Chronic malalignment (at several levels) is seen throughout the lumbar spine.   Additionally, colonic diverticulosis is seen without acute diverticulitis.  No acute appendicitis.  No mechanical bowel obstruction.  No pneumoperitoneum or pneumatosis.  The patient has undergone cholecystectomy.  No acute pancreatitis.  Small to moderate sized hiatal hernia is seen.  There are coronary artery calcifications.  No cardiac enlargement.  A trace amount of pericardial effusion is seen.  There may be tiny bilateral pleural effusions.  Motion artifact obscures detail.  Mild prominence of the adrenal glands is seen, which is nonspecific.  No definite hydronephrosis, nephrolithiasis, or ureterolithiasis.  There are arterial calcifications in close proximity to the distal ureters.  It appears that the patient has undergone hysterectomy.  Degenerative changes involve the bilateral hip joints and the bilateral sacroiliac joints.  There is mild distension of the urinary bladder.      Impression:    1. There is a 7.6 cm fusiform aneurysm of the infrarenal abdominal aorta, as discussed.  No acute retroperitoneal hemorrhage is seen to suggest aneurysm leakage or rupture.   2. Age-indeterminate (but possibly subacute) vertebral compression fractures are seen at T11 and L1, estimated at 30 percent or less in degree.  There is minimal if any posterior wall retropulsion associated with these findings.   3. There are small bilateral pleural  effusions.   4. The patient has undergone hysterectomy.   5. Probably no significant acute findings are seen otherwise.   6. Please see above comments for further detail.     A preliminary report (regarding the abdominal aortic aneurysm, AAA) was given to Dr. Moya (ordering/attending [facility] ED Physician) at approximately 2205 hours on 8/11/2023.   Please note that portions of this note were completed with a voice recognition program.  VALERIA FRIEMDAN JR, MD       Electronically Signed and Approved By: VALERIA FRIEDMAN JR, MD on 8/11/2023 at 22:18                 []  EKG/Telemetry   []  Cardiology/Vascular   []  Pathology  []  Old records  []  Other:    Assessment & Plan   Assessment / Plan     Assessment:  DM Type 2  Hypothyroid--uncontrolled  Suspect Dementia  HLD  Fall at home   7.6 aneurysm of the infrarenal abdominal aorta  vertebral compression fractures are seen at T11 and L1  Hypothermia 96.1    Plan:  Vascular surgery consulted for aneurysm. Patient declines at the time and she is not a good operative candidate due to advanced age   Continue  increased dose of her Synthroid as her thyroid studies are abnormal  Continue sliding scale insulin for diabetes  Potassium normalized  Monitor Cr  Palliative consulted  [facility] EOS meeting tomorrow    Discussed plan with RN.    DVT prophylaxis:  No DVT prophylaxis order currently exists.    CODE STATUS:   Medical Intervention Limits: NO intubation (DNI)  Level Of Support Discussed With: Patient  Code Status (Patient has no pulse and is not breathing): No CPR (Do Not Attempt to Resuscitate)  Medical Interventions (Patient has pulse or is breathing): Limited Support    Electronically signed by Maikol Giles MD, 08/15/23, 5:20 PM EDT.

## 2023-08-15 NOTE — PLAN OF CARE
Goal Outcome Evaluation:  Pt has slept most of the night.  Was cleaned several times for incontinence.  Purwick was applied  Does cough intermittently through shift.  Pending hospice at home.

## 2023-08-15 NOTE — NURSING NOTE
Palliative care nurse was notified patient is more alert and talking and her neighbor Omar is present.    Met with Omar and introduced palliative care.  We discussed patient's condition and discharge planning.  At this time, we are awaiting bed offers for inpatient rehab.     Patient was able to eat lunch and asked for assistance to use the bathroom.  PCA assisted with bedpan.      Requested PT to see the patient now that she is more alert and able to participate in therapy.      Patient has a hospice visit scheduled for tomorrow for an EOS.  Palliative care will follow up after that visit.      Natalya RAMÍREZ, RN, CHPN

## 2023-08-15 NOTE — NURSING NOTE
Patient remains on PCU 2. Collaborated with the RN.  Patient is non responsive. The nurse tells me 2 day ago, Ms Longoria was awake and responsive.   Patient [facility] is asking for referral for in patient rehab.  Marlen was notified.  [facility] was ok for a hospice referral for explanation of services.  Palliative care will place referral and continue to follow for support as needed  Holly SCHMID RN, BSN  Palliative Care

## 2023-08-16 LAB
ALBUMIN SERPL-MCNC: 2.6 G/DL (ref 3.5–5.2)
ANION GAP SERPL CALCULATED.3IONS-SCNC: 10.3 MMOL/L (ref 5–15)
BASOPHILS # BLD AUTO: 0.03 10*3/MM3 (ref 0–0.2)
BASOPHILS NFR BLD AUTO: 0.4 % (ref 0–1.5)
BILIRUB UR QL STRIP: NEGATIVE
BUN SERPL-MCNC: 26 MG/DL (ref 8–23)
BUN/CREAT SERPL: 16.9 (ref 7–25)
CALCIUM SPEC-SCNC: 8.3 MG/DL (ref 8.2–9.6)
CHLORIDE SERPL-SCNC: 109 MMOL/L (ref 98–107)
CLARITY UR: CLEAR
CO2 SERPL-SCNC: 15.7 MMOL/L (ref 22–29)
COLOR UR: YELLOW
CREAT SERPL-MCNC: 1.54 MG/DL (ref 0.57–1)
DEPRECATED RDW RBC AUTO: 64 FL (ref 37–54)
EGFRCR SERPLBLD CKD-EPI 2021: 31.4 ML/MIN/1.73
EOSINOPHIL # BLD AUTO: 0.12 10*3/MM3 (ref 0–0.4)
EOSINOPHIL NFR BLD AUTO: 1.5 % (ref 0.3–6.2)
ERYTHROCYTE [DISTWIDTH] IN BLOOD BY AUTOMATED COUNT: 19.2 % (ref 12.3–15.4)
GLUCOSE BLDC GLUCOMTR-MCNC: 136 MG/DL (ref 70–99)
GLUCOSE BLDC GLUCOMTR-MCNC: 79 MG/DL (ref 70–99)
GLUCOSE BLDC GLUCOMTR-MCNC: 91 MG/DL (ref 70–99)
GLUCOSE BLDC GLUCOMTR-MCNC: 92 MG/DL (ref 70–99)
GLUCOSE SERPL-MCNC: 85 MG/DL (ref 65–99)
GLUCOSE UR STRIP-MCNC: NEGATIVE MG/DL
HCT VFR BLD AUTO: 32.3 % (ref 34–46.6)
HGB BLD-MCNC: 10.1 G/DL (ref 12–15.9)
HGB UR QL STRIP.AUTO: NEGATIVE
IMM GRANULOCYTES # BLD AUTO: 0.05 10*3/MM3 (ref 0–0.05)
IMM GRANULOCYTES NFR BLD AUTO: 0.6 % (ref 0–0.5)
KETONES UR QL STRIP: NEGATIVE
LEUKOCYTE ESTERASE UR QL STRIP.AUTO: NEGATIVE
LYMPHOCYTES # BLD AUTO: 1.9 10*3/MM3 (ref 0.7–3.1)
LYMPHOCYTES NFR BLD AUTO: 24.5 % (ref 19.6–45.3)
MCH RBC QN AUTO: 30.6 PG (ref 26.6–33)
MCHC RBC AUTO-ENTMCNC: 31.3 G/DL (ref 31.5–35.7)
MCV RBC AUTO: 97.9 FL (ref 79–97)
MONOCYTES # BLD AUTO: 1.05 10*3/MM3 (ref 0.1–0.9)
MONOCYTES NFR BLD AUTO: 13.5 % (ref 5–12)
NEUTROPHILS NFR BLD AUTO: 4.61 10*3/MM3 (ref 1.7–7)
NEUTROPHILS NFR BLD AUTO: 59.5 % (ref 42.7–76)
NITRITE UR QL STRIP: NEGATIVE
NRBC BLD AUTO-RTO: 0 /100 WBC (ref 0–0.2)
PH UR STRIP.AUTO: 6 [PH] (ref 5–8)
PHOSPHATE SERPL-MCNC: 2.8 MG/DL (ref 2.5–4.5)
PLATELET # BLD AUTO: 220 10*3/MM3 (ref 140–450)
PMV BLD AUTO: 8.9 FL (ref 6–12)
POTASSIUM SERPL-SCNC: 4.1 MMOL/L (ref 3.5–5.2)
PROT UR QL STRIP: ABNORMAL
RBC # BLD AUTO: 3.3 10*6/MM3 (ref 3.77–5.28)
SODIUM SERPL-SCNC: 135 MMOL/L (ref 136–145)
SP GR UR STRIP: 1.01 (ref 1–1.03)
UROBILINOGEN UR QL STRIP: ABNORMAL
WBC NRBC COR # BLD: 7.76 10*3/MM3 (ref 3.4–10.8)

## 2023-08-16 PROCEDURE — 97161 PT EVAL LOW COMPLEX 20 MIN: CPT

## 2023-08-16 PROCEDURE — 97165 OT EVAL LOW COMPLEX 30 MIN: CPT

## 2023-08-16 PROCEDURE — 82948 REAGENT STRIP/BLOOD GLUCOSE: CPT

## 2023-08-16 PROCEDURE — 85025 COMPLETE CBC W/AUTO DIFF WBC: CPT | Performed by: INTERNAL MEDICINE

## 2023-08-16 PROCEDURE — 99232 SBSQ HOSP IP/OBS MODERATE 35: CPT | Performed by: INTERNAL MEDICINE

## 2023-08-16 PROCEDURE — 80069 RENAL FUNCTION PANEL: CPT | Performed by: INTERNAL MEDICINE

## 2023-08-16 PROCEDURE — 81003 URINALYSIS AUTO W/O SCOPE: CPT | Performed by: PHYSICIAN ASSISTANT

## 2023-08-16 RX ORDER — SODIUM BICARBONATE 650 MG/1
325 TABLET ORAL 2 TIMES DAILY
Status: DISCONTINUED | OUTPATIENT
Start: 2023-08-16 | End: 2023-08-17

## 2023-08-16 RX ADMIN — SODIUM BICARBONATE 650 MG TABLET 325 MG: at 11:39

## 2023-08-16 RX ADMIN — FAMOTIDINE 10 MG: 10 TABLET ORAL at 08:42

## 2023-08-16 RX ADMIN — WHITE PETROLATUM 1 APPLICATION: 1.75 OINTMENT TOPICAL at 09:14

## 2023-08-16 RX ADMIN — SODIUM BICARBONATE 650 MG TABLET 325 MG: at 20:29

## 2023-08-16 RX ADMIN — WHITE PETROLATUM 1 APPLICATION: 1.75 OINTMENT TOPICAL at 21:03

## 2023-08-16 RX ADMIN — LEVOTHYROXINE SODIUM 100 MCG: 0.1 TABLET ORAL at 06:23

## 2023-08-16 RX ADMIN — MICONAZOLE NITRATE: 2 POWDER TOPICAL at 20:30

## 2023-08-16 RX ADMIN — NYSTATIN: 100000 POWDER TOPICAL at 08:42

## 2023-08-16 NOTE — PLAN OF CARE
Goal Outcome Evaluation:  Plan of Care Reviewed With: patient        Progress: no change  Outcome Evaluation: Patient presents with limitations in self-care, functional transfers, balance, and endurance. She would benefit from continued skilled occupational therapy services to maximize independence with ADLs/functional transfers.      Anticipated Discharge Disposition (OT): sub acute care setting

## 2023-08-16 NOTE — PROGRESS NOTES
[facility]   Hospitalist Progress Note  Date: 2023  Patient Name: Jayashree Longoria  : 1929  MRN: [id number]  Date of admission: 2023      Subjective   Subjective     Chief Complaint: Fall and found down     Summary:   89 y.o. female with a past medical history of  type 2 diabetes mellitus, hypothyroidism, hyperlipidemia presented to the ED for evaluation of low back pain.  Patient was found to be on the floor by a friend who regularly checks on her.  Patient said that she has fell yesterday while walking.  Unsure why she fell.  Denies any lightheadedness, palpitations prior to the fall.  Unsure if she hit her head or not.  Was not able to get up and was lying on the floor.  Patient states that she has not seen a doctor in the last few years.  Positive for lower back pain.  Denies any fevers, chills, abdominal pain, nausea, vomiting, focal weakness, numbness, tingling.     In the ED, vital signs temperature 97.9, pulse 82, respiratory 18, blood pressure 117/79 on room air saturating around 94%.  Labs showed initial troponin 63, sodium 136, potassium 3.5, bicarb 17.1, creatinine 2.21, 3 years ago was 1.3, magnesium 2.7, rest of the CMP within normal limits, WBC 14.17, normal hemoglobin, normal platelets, urinalysis does not look infected, chest x-ray did not show any acute cardiopulmonary process.  X-ray of the lumbar spine showed compression fraction of the L1 of indeterminate age.  Large left paravertebral mass with peripheral calvarial calcifications may represent an abdominal aortic aneurysm.  CT head without contrast did not show any acute intracranial abnormality.  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  No acute retroperitoneal hemorrhage is seen to suggest aneurysm leakage or rupture.  Vertebral compression fracture at the T11 and L1 is noted.  Small bilateral pleural effusions are noted.  Case has been discussed by the ED  physician with vascular surgeon on-call Dr. Vickers, will be evaluating the patient in the morning and discuss regarding the management options for abdominal aortic aneurysm.  Received IV fluids in the ED.  Patient has been admitted for further evaluation and management of abdominal aortic aneurysm, HANNAH, dehydration.       Interval Followup:   Patient seen and examined awake alert oriented patient's friend is at the bedside urine extremely concentrated foul-smelling checking UA CNS patient with metabolic acidosis presumed secondary to CKD adding sodium bicarbonate tabs.  Patient meeting with hospice later today palliative care has been consulted.  Patient with large aneurysm seen by vascular surgery not a surgical candidate given her advanced age and quality of life.    Review of Systems  All systems reviewed negative septa following: Patient complains of generalized weakness fatigue    Objective   Objective     Vitals:   Temp:  [97.8 øF (36.6 øC)-98.3 øF (36.8 øC)] 98.3 øF (36.8 øC)  Heart Rate:  [78-88] 83  Resp:  [16] 16  BP: (106-141)/(52-86) 108/58  Physical Exam    Constitutional:awake, alert answering questions   Eyes: Pupils equal, sclerae anicteric, no conjunctival injection   HENT: NCAT, mucous membranes moist   Neck: Supple, no thyromegaly, no lymphadenopathy, trachea midline   Respiratory: Clear to auscultation bilaterally, nonlabored respirations    Cardiovascular: RRR, no murmurs, rubs, or gallops, palpable pedal pulses bilaterally   Gastrointestinal: Positive bowel sounds, soft, nontender, nondistended   Musculoskeletal: No bilateral ankle edema, no clubbing or cyanosis to extremities   Psychiatric: Appropriate affect, cooperative   Neurologic: Oriented x 1; speech clear   Skin: No rashes     Result Review    Result Review:  I have reviewed the following:  [x]  Laboratory  CBC          8/14/2023    04:02 8/15/2023    04:41 8/16/2023    04:32   CBC   WBC 13.64  8.77  7.76    RBC 3.31  3.45  3.30     Hemoglobin 10.4  10.8  10.1    Hematocrit 31.4  33.8  32.3    MCV 94.9  98.0  97.9    MCH 31.4  31.3  30.6    MCHC 33.1  32.0  31.3    RDW 19.3  19.7  19.2    Platelets 207  207  220      BMP          8/14/2023    04:02 8/15/2023    04:41 8/16/2023    04:32   BMP   BUN 41  31  26    Creatinine 1.75  1.44  1.54    Sodium 138  139  135    Potassium 4.5  4.0  4.1    Chloride 110  112  109    CO2 17.4  17.3  15.7    Calcium 8.8  8.6  8.3      [x]  Microbiology  No results found for: ACANTHNAEG, AFBCX, BPERTUSSISCX, BLOODCX  No results found for: BCIDPCR, CXREFLEX, CSFCX, CULTURETIS  No results found for: CULTURES, HSVCX, URCX  No results found for: EYECULTURE, GCCX, HSVCULTURE, LABHSV  No results found for: LEGIONELLA, MRSACX, MUMPSCX, MYCOPLASCX  No results found for: NOCARDIACX, STOOLCX  No results found for: THROATCX, UNSTIMCULT, URINECX, CULTURE, VZVCULTUR  No results found for: VIRALCULTU, WOUNDCX    [x]  Radiology     CT Abdomen Pelvis Without Contrast    Result Date: 8/11/2023  PROCEDURE: CT ABDOMEN PELVIS WO CONTRAST  COMPARISON: 8/11/2023.  INDICATIONS: Aneurysm on x-ray with back pain; generalized lower back pain.  TECHNIQUE: 728 CT images were created without intravenous contrast.   PROTOCOL:   Standard CT imaging protocol performed.    RADIATION:   Total DLP: 599.5 mGy*cm.   Automated exposure control was utilized to minimize radiation dose.  FINDINGS: There is a large (7.6 cm) infrarenal abdominal aortic aneurysm, probably fusiform in configuration.  The aneurysm neck is roughly estimated at 3 cm, as on image 106 of series 202.  The fusiform aneurysm extends is about 9.4 cm caudally to just above the aortic bifurcation.  No acute retroperitoneal or intraperitoneal hemorrhage is seen to suggest aneurysm leakage or rupture.  There may be mild fusiform dilatation of the bilateral common iliac arteries.  The left common iliac artery measures about 1.6 cm in greatest diameter.  The right common iliac artery  measures about 1.5 cm in greatest diameter.  The proximal left internal iliac artery measures about 1.4 cm in greatest diameter.  The proximal right internal iliac artery measures about 1.1 cm in greatest diameter.  Extensive atherosclerotic changes are seen.  For instance, extensive arterial calcification involves the proximal bilateral renal arteries.  Minimal calcified plaque is seen at the origins of the celiac trunk and superior mesenteric artery.  The inferior mesenteric artery is not well evaluated by this study; it appears to arise from the lower left anterior margin of the large fusiform aneurysm.   Age-indeterminate vertebral compression fractures are seen at T11 and L1, estimated at 30 percent or less in degree.  Vacuum phenomenon involves the disc interspaces at T11-12, T12-L1, L1-2, L2-3, L3-4, and L4-5.  Severe degenerative changes are seen throughout the imaged spine.  There is suspected levoscoliosis of the lumbar spine.  Chronic malalignment (at several levels) is seen throughout the lumbar spine.   Additionally, colonic diverticulosis is seen without acute diverticulitis.  No acute appendicitis.  No mechanical bowel obstruction.  No pneumoperitoneum or pneumatosis.  The patient has undergone cholecystectomy.  No acute pancreatitis.  Small to moderate sized hiatal hernia is seen.  There are coronary artery calcifications.  No cardiac enlargement.  A trace amount of pericardial effusion is seen.  There may be tiny bilateral pleural effusions.  Motion artifact obscures detail.  Mild prominence of the adrenal glands is seen, which is nonspecific.  No definite hydronephrosis, nephrolithiasis, or ureterolithiasis.  There are arterial calcifications in close proximity to the distal ureters.  It appears that the patient has undergone hysterectomy.  Degenerative changes involve the bilateral hip joints and the bilateral sacroiliac joints.  There is mild distension of the urinary bladder.      Impression:     1. There is a 7.6 cm fusiform aneurysm of the infrarenal abdominal aorta, as discussed.  No acute retroperitoneal hemorrhage is seen to suggest aneurysm leakage or rupture.   2. Age-indeterminate (but possibly subacute) vertebral compression fractures are seen at T11 and L1, estimated at 30 percent or less in degree.  There is minimal if any posterior wall retropulsion associated with these findings.   3. There are small bilateral pleural effusions.   4. The patient has undergone hysterectomy.   5. Probably no significant acute findings are seen otherwise.   6. Please see above comments for further detail.     A preliminary report (regarding the abdominal aortic aneurysm, AAA) was given to Dr. Moya (ordering/attending [facility] ED Physician) at approximately 2205 hours on 8/11/2023.   Please note that portions of this note were completed with a voice recognition program.  AVLERIA FRIEDMAN JR, MD       Electronically Signed and Approved By: VALERIA FRIEDMAN JR, MD on 8/11/2023 at 22:18                 []  EKG/Telemetry   []  Cardiology/Vascular   []  Pathology  []  Old records  []  Other:    Assessment & Plan   Assessment / Plan     Assessment:  DM Type 2  Hypothyroid--uncontrolled  Suspect Dementia  HLD  Fall at home   7.6 aneurysm of the infrarenal abdominal aorta  vertebral compression fractures are seen at T11 and L1  Hypothermia 96.1    Plan:  Vascular surgery consulted for aneurysm. Patient declines at the time and she is not a good operative candidate due to advanced age/quality of life  Check UA CNS  Add sodium bicarbonate  Monitor chemistries for renal function electrolytes  Monitor CBC  Miconazole for groin rash  Continue  increased dose of her Synthroid as her thyroid studies are abnormal  Continue sliding scale insulin for diabetes  Palliative consulted  [facility] EOS 8/16/2023  Discussed with RN discussed with patient patient's friend at bedside    DVT prophylaxis:  No DVT prophylaxis order currently  exists.    CODE STATUS:   Medical Intervention Limits: NO intubation (DNI)  Level Of Support Discussed With: Patient  Code Status (Patient has no pulse and is not breathing): No CPR (Do Not Attempt to Resuscitate)  Medical Interventions (Patient has pulse or is breathing): Limited Support    Electronically signed by DIEGO Coreas, 08/16/23, 12:35 PM EDT.      Attending Documentation:  Patient independently seen and evaluated, above documentation reflects plan put forth during bedside rounds.  More than 51% of the time of this patient encounter was performed by me. I discussed the care plan with JULIANO Ely PA-C, I agree with his findings and plan as documented, what I have added to the care plan and modified is as follows in my documentation and my medical decision making; 89-year-old female with a host of medical problems presented with low back pain, found to have aneurysm, declined vascular surgery evaluation/intervention at this time.  We are consulting with hospice.  Continue other current care.  We will check UA and sensitivity given some dysuria.  Electronically signed by Maikol Giles MD, 08/16/23, 3:25 PM EDT.

## 2023-08-16 NOTE — THERAPY EVALUATION
Acute Care - Physical Therapy Initial Evaluation  [facility] Pena     Patient Name: Jayashree Longoria  : 1929  MRN: [id number]  Today's Date: 2023   Admit date: 2023     Referring Physician: Maikol Giles MD     Surgery Date:* No surgery found *           Visit Dx:     ICD-10-CM ICD-9-CM   1. Fall, initial encounter  W19.XXXA E888.9   2. Dehydration  E86.0 276.51   3. HANNAH (acute kidney injury)  N17.9 584.9   4. Abdominal aortic aneurysm (AAA) without rupture, unspecified part  I71.40 441.4   5. Decreased activities of daily living (ADL)  Z78.9 V49.89   6. Difficulty in walking  R26.2 719.7     Patient Active Problem List   Diagnosis    HANNAH (acute kidney injury)    Hypothermia     Past Medical History:   Diagnosis Date    Diabetes mellitus     Disease of thyroid gland     Hyperlipidemia     Hypertension     Renal disorder      History reviewed. No pertinent surgical history.  PT Assessment (last 12 hours)       PT Evaluation and Treatment       Row Name 23 1515          Physical Therapy Time and Intention    Subjective Information complains of;fatigue;pain  -REBECCA     Document Type evaluation  -REBECCA     Mode of Treatment individual therapy;physical therapy  -REBECCA     Patient Effort good  -REBECCA       Row Name 23 1515          General Information    Patient Profile Reviewed yes  -REBECCA     Patient Observations alert;cooperative;agree to therapy  -REBECCA     Prior Level of Function min assist:;all household mobility;ADL's  -REBECCA     Equipment Currently Used at Home walker, rolling  -REBECCA     Existing Precautions/Restrictions fall  -REBECCA       Row Name 23 1515          Living Environment    Current Living Arrangements home  -REBECCA     People in Home alone;other (see comments)  Has friends that check on her regularly  -REBECCA     Primary Care Provided by self;friend  -REBECCA       Row Name 23 1515          Home Use of Assistive/Adaptive Equipment    Equipment Currently Used at Home walker, rolling  -REBECCA       Row Name  08/16/23 1515          Cognition    Affect/Mental Status (Cognition) confused  -REBECCA       Row Name 08/16/23 1515          Range of Motion (ROM)    Range of Motion ROM is WFL  -REBECCA       Row Name 08/16/23 1515          Strength (Manual Muscle Testing)    Strength (Manual Muscle Testing) bilateral lower extremities  3+/5  -REBECCA       Row Name 08/16/23 1515          Bed Mobility    Bed Mobility bed mobility (all) activities  -REBECCA     All Activities, [place] (Bed Mobility) minimum assist (75% patient effort)  -REBECCA     Supine-Sit [place] (Bed Mobility) minimum assist (75% patient effort)  -REBECCA     Sit-Supine [place] (Bed Mobility) minimum assist (75% patient effort)  -REBECCA       Row Name 08/16/23 1515          Transfers    Transfers bed-chair transfer;sit-stand transfer;stand-sit transfer  -REBECCA       Row Name 08/16/23 1515          Bed-Chair Transfer    Bed-Chair [place] (Transfers) minimum assist (75% patient effort)  -REBECCA     Assistive Device (Bed-Chair Transfers) walker, front-wheeled  -REBECCA       Row Name 08/16/23 1515          Sit-Stand Transfer    Sit-Stand [place] (Transfers) minimum assist (75% patient effort)  -REBECCA     Assistive Device (Sit-Stand Transfers) walker, front-wheeled  -REBECCA       Row Name 08/16/23 1515          Stand-Sit Transfer    Stand-Sit [place] (Transfers) contact guard  -REBECCA     Assistive Device (Stand-Sit Transfers) walker, front-wheeled  -REBECCA       Row Name 08/16/23 1515          Gait/Stairs (Locomotion)    Gait/Stairs Locomotion gait/ambulation assistive device  -REBECCA     [place] Level (Gait) contact guard  -REBECCA     Assistive Device (Gait) walker, front-wheeled  -REBECCA     Patient was Unable to Ambulate yes  -REBECCA     Distance in Feet (Gait) 5  -REBECCA       Row Name 08/16/23 1515          Safety Issues, Functional Mobility    Impairments Affecting Function (Mobility) balance;pain;strength  -REBECCA       Row Name 08/16/23 1515          Balance    Balance Assessment standing dynamic balance   -REBECCA     Dynamic Standing Balance contact guard  -REBECCA     Position/Device Used, Standing Balance walker, front-wheeled  -REBECCA       Row Name             Wound 08/12/23 0053 Bilateral anterior abdomen MASD (Moisture associated skin damage)    Wound - Properties Group Placement Date: 08/12/23  -AK Placement Time: 0053  -AK Present on Hospital Admission: Y  -AK Side: Bilateral  -AK Orientation: anterior  -AK Location: abdomen  -AK Primary Wound Type: MASD  -AK    Retired Wound - Properties Group Placement Date: 08/12/23  -AK Placement Time: 0053  -AK Present on Hospital Admission: Y  -AK Side: Bilateral  -AK Orientation: anterior  -AK Location: abdomen  -AK Primary Wound Type: MASD  -AK    Retired Wound - Properties Group Date first assessed: 08/12/23  -AK Time first assessed: 0053  -AK Present on Hospital Admission: Y  -AK Side: Bilateral  -AK Location: abdomen  -AK Primary Wound Type: MASD  -AK      Row Name             Wound 08/12/23 0054 Left posterior plantar    Wound - Properties Group Placement Date: 08/12/23  -AK Placement Time: 0054  -AK Side: Left  -AK Orientation: posterior  -AK Location: plantar  -AK    Retired Wound - Properties Group Placement Date: 08/12/23  -AK Placement Time: 0054  -AK Side: Left  -AK Orientation: posterior  -AK Location: plantar  -AK    Retired Wound - Properties Group Date first assessed: 08/12/23  -AK Time first assessed: 0054  -AK Side: Left  -AK Location: plantar  -AK      Row Name             Wound 08/12/23 0055 Bilateral anterior fourth toe Abrasion    Wound - Properties Group Placement Date: 08/12/23  -AK Placement Time: 0055  -AK Present on Hospital Admission: Y  -AK Side: Bilateral  -AK Orientation: anterior  -AK Location: fourth toe  -AK Primary Wound Type: Abrasion  -AK    Retired Wound - Properties Group Placement Date: 08/12/23  -AK Placement Time: 0055  -AK Present on Hospital Admission: Y  -AK Side: Bilateral  -AK Orientation: anterior  -AK Location: fourth toe  -AK  Primary Wound Type: Abrasion  -AK    Retired Wound - Properties Group Date first assessed: 08/12/23  -AK Time first assessed: 0055  -AK Present on Hospital Admission: Y  -AK Side: Bilateral  -AK Location: fourth toe  -AK Primary Wound Type: Abrasion  -AK      Row Name             Wound 08/12/23 0056 Bilateral posterior gluteal    Wound - Properties Group Placement Date: 08/12/23  -AK Placement Time: 0056  -AK Present on Hospital Admission: Y  -AK Side: Bilateral  -AK Orientation: posterior  -AK Location: gluteal  -AK    Retired Wound - Properties Group Placement Date: 08/12/23  -AK Placement Time: 0056  -AK Present on Hospital Admission: Y  -AK Side: Bilateral  -AK Orientation: posterior  -AK Location: gluteal  -AK    Retired Wound - Properties Group Date first assessed: 08/12/23  -AK Time first assessed: 0056  -AK Present on Hospital Admission: Y  -AK Side: Bilateral  -AK Location: gluteal  -AK      Row Name             Wound 08/12/23 0104 Right anterior foot Traumatic    Wound - Properties Group Placement Date: 08/12/23  -AK Placement Time: 0104  -AK Present on Hospital Admission: Y  -AK Side: Right  -AK Orientation: anterior  -AK Location: foot  -AK Primary Wound Type: Traumatic  -AK    Retired Wound - Properties Group Placement Date: 08/12/23  -AK Placement Time: 0104  -AK Present on Hospital Admission: Y  -AK Side: Right  -AK Orientation: anterior  -AK Location: foot  -AK Primary Wound Type: Traumatic  -AK    Retired Wound - Properties Group Date first assessed: 08/12/23  -AK Time first assessed: 0104  -AK Present on Hospital Admission: Y  -AK Side: Right  -AK Location: foot  -AK Primary Wound Type: Traumatic  -AK      Row Name             Wound 08/12/23 0106 Left anterior foot Traumatic    Wound - Properties Group Placement Date: 08/12/23  -AK Placement Time: 0106  -AK Present on Hospital Admission: Y  -AK Side: Left  -AK Orientation: anterior  -AK Location: foot  -AK Primary Wound Type: Traumatic  -AK     Retired Wound - Properties Group Placement Date: 08/12/23  -AK Placement Time: 0106  -AK Present on Hospital Admission: Y  -AK Side: Left  -AK Orientation: anterior  -AK Location: foot  -AK Primary Wound Type: Traumatic  -AK    Retired Wound - Properties Group Date first assessed: 08/12/23  -AK Time first assessed: 0106  -AK Present on Hospital Admission: Y  -AK Side: Left  -AK Location: foot  -AK Primary Wound Type: Traumatic  -AK      Row Name             Wound 08/12/23 0113 Bilateral posterior heel Pressure Injury    Wound - Properties Group Placement Date: 08/12/23  -AK Placement Time: 0113 -AK Present on Hospital Admission: Y  -AK Side: Bilateral  -AK Orientation: posterior  -AK Location: heel  -AK Primary Wound Type: Pressure inj  -AK    Retired Wound - Properties Group Placement Date: 08/12/23  -AK Placement Time: 0113  -AK Present on Hospital Admission: Y  -AK Side: Bilateral  -AK Orientation: posterior  -AK Location: heel  -AK Primary Wound Type: Pressure inj  -AK    Retired Wound - Properties Group Date first assessed: 08/12/23  -AK Time first assessed: 0113  -AK Present on Hospital Admission: Y  -AK Side: Bilateral  -AK Location: heel  -AK Primary Wound Type: Pressure inj  -AK      Row Name 08/16/23 1515          Plan of Care Review    Plan of Care Reviewed With patient  -REBECCA     Outcome Evaluation Patient presents with decreased LE strength and is unable to safely transfer and ambulate independently. Skilled PT is required to address these deficits.  -REBECCA       Row Name 08/16/23 1515          Therapy Assessment/Plan (PT)    Rehab Potential (PT) good, to achieve stated therapy goals  -REBECCA     Criteria for Skilled Interventions Met (PT) skilled treatment is necessary  -REBECCA     Therapy Frequency (PT) daily  -REBECCA     Predicted Duration of Therapy Intervention (PT) 10 days  -REBECCA     Problem List (PT) problems related to;balance;cognition;strength;pain  -REBECCA     Activity Limitations Related to Problem List (PT)  unable to ambulate safely;unable to transfer safely;BADLs not performed adequately or safely;IADLs not performed adequately or safely;community activities not performed adequately or safely  -REBECCA       Row Name 08/16/23 1515          Therapy Plan Review/Discharge Plan (PT)    Therapy Plan Review (PT) evaluation/treatment results reviewed;care plan/treatment goals reviewed;patient  -REBECCA       Row Name 08/16/23 1515          Physical Therapy Goals    Transfer Goal Selection (PT) transfer, PT goal 1  -REBECCA     Gait Training Goal Selection (PT) gait training, PT goal 2  -REBECCA     Strength Goal Selection (PT) strength, PT goal 3  -REBECCA       Row Name 08/16/23 1515          Transfer Goal 1 (PT)    Activity/Assistive Device (Transfer Goal 1, PT) transfers, all  -REBECCA     [place] Level/Cues Needed (Transfer Goal 1, PT) independent  -REBECCA     Time Frame (Transfer Goal 1, PT) long term goal (LTG);10 days  -REBECCA       Row Name 08/16/23 1515          Gait Training Goal 2 (PT)    Activity/Assistive Device (Gait Training Goal 2, PT) gait (walking locomotion)  -REBECCA     [place] Level (Gait Training Goal 2, PT) independent  -REBECCA     Distance (Gait Training Goal 2, PT) 50ft  -REBECCA     Time Frame (Gait Training Goal 2, PT) long term goal (LTG);10 days  -REBECCA       Row Name 08/16/23 1515          Strength Goal 3 (PT)    Strength Goal 3 (PT) Patient will have 5/5 LE strength bilaterally.  -REBECCA     Time Frame (Strength Goal 3, PT) long term goal (LTG);10 days  -REBECCA               User Key  (r) = Recorded By, (t) = Taken By, (c) = Cosigned By      Initials Name Provider Type    REBECCASyed Mera, PT Physical Therapist    Conchita Orlando, RN Registered Nurse                    Physical Therapy Education       Title: PT OT SLP Therapies (Done)       Topic: Physical Therapy (Done)       Point: Mobility training (Done)       Learning Progress Summary             Patient Acceptance, E,TB, VU by REBECCA at 8/16/2023 152                                          User Key       Initials Effective Dates Name Provider Type Discipline    REBECCA 06/03/21 -  Syed Tejeda PT Physical Therapist PT                  PT Recommendation and Plan  Anticipated Discharge Disposition (PT): sub acute care setting  Planned Therapy Interventions (PT): balance training, bed mobility training, gait training, home exercise program, motor coordination training, neuromuscular re-education, ROM (range of motion), stair training, strengthening, stretching, transfer training  Therapy Frequency (PT): daily  Plan of Care Reviewed With: patient  Outcome Evaluation: Patient presents with decreased LE strength and is unable to safely transfer and ambulate independently. Skilled PT is required to address these deficits.   Outcome Measures       Row Name 08/16/23 1500             How much help from another person do you currently need...    Turning from your back to your side while in flat bed without using bedrails? 2  -REBECCA      Moving from lying on back to sitting on the side of a flat bed without bedrails? 2  -REBECCA      Moving to and from a bed to a chair (including a wheelchair)? 2  -REBECCA      Standing up from a chair using your arms (e.g., wheelchair, bedside chair)? 2  -REBECCA      Climbing 3-5 steps with a railing? 1  -REBECCA      To walk in hospital room? 2  -REBECCA      AM-PAC 6 Clicks Score (PT) 11  -REBECCA                User Key  (r) = Recorded By, (t) = Taken By, (c) = Cosigned By      Initials Name Provider Type    Syed Barragan PT Physical Therapist                     Time Calculation:    PT Charges       Row Name 08/16/23 1515             Time Calculation    PT Received On 08/16/23  -REBECCA      PT Goal Re-Cert Due Date 08/25/23  -REBECCA         Untimed Charges    PT Eval/Re-eval Minutes 35  -REBECCA         Total Minutes    Untimed Charges Total Minutes 35  -REBECCA       Total Minutes 35  -REBECCA                User Key  (r) = Recorded By, (t) = Taken By, (c) = Cosigned By      Initials Name Provider Type    REBECCA Tejeda  Syed DUNLAP, PT Physical Therapist                      PT G-Codes  Outcome Measure Options: AM-PAC 6 Clicks Daily Activity (OT), Optimal Instrument  AM-PAC 6 Clicks Score (PT): 11  AM-PAC 6 Clicks Score (OT): 15    Syed Tejeda, PT  8/16/2023

## 2023-08-16 NOTE — THERAPY EVALUATION
Patient Name: Jayashree Longoria  : 1929    MRN: [id number]                              Today's Date: 2023       Admit Date: 2023    Visit Dx:     ICD-10-CM ICD-9-CM   1. Fall, initial encounter  W19.XXXA E888.9   2. Dehydration  E86.0 276.51   3. HANNAH (acute kidney injury)  N17.9 584.9   4. Abdominal aortic aneurysm (AAA) without rupture, unspecified part  I71.40 441.4   5. Decreased activities of daily living (ADL)  Z78.9 V49.89     Patient Active Problem List   Diagnosis    HANNAH (acute kidney injury)    Hypothermia     Past Medical History:   Diagnosis Date    Diabetes mellitus     Disease of thyroid gland     Hyperlipidemia     Hypertension     Renal disorder      History reviewed. No pertinent surgical history.   General Information       Row Name 23 1415          OT Time and Intention    Document Type evaluation  -     Mode of Treatment individual therapy;occupational therapy  -       Row Name 23 141          General Information    Patient Profile Reviewed yes  -     Prior Level of Function --  (I) with ADLs/IADLs, ambulated w/o a device, has a step over tub where she stands to shower or sponge bathes, has a standard commode, stands to groom, drives, and no home.  -     Existing Precautions/Restrictions fall  -     Barriers to Rehab none identified  -       Row Name 23 1415          Occupational Profile    Reason for Services/Referral (Occupational Profile) Patient is a 89 year old female admitted to [facility] s/p fall on 2023. Occupational therapy consulted due to recent decline in ADLs/functional transfers. No previous occupational therapy services for current condition.  -       Row Name 23 1415          Living Environment    People in Home alone  Friend/neighbor checks on her regularly  -       Row Name 23 1415          Home Main Entrance    Number of Stairs, Main Entrance three  -       Row Name 23 141           Cognition    Orientation Status (Cognition) oriented x 3  [place]  -[facility] Name 08/16/23 1415          Safety Issues, Functional Mobility    Impairments Affecting Function (Mobility) balance;endurance/activity tolerance;pain;strength  -               User Key  (r) = Recorded By, (t) = Taken By, (c) = Cosigned By      Initials Name Provider Type     Leanne Bazzi OT Occupational Therapist                     Mobility/ADL's       [facility] Name 08/16/23 1418          Bed Mobility    Bed Mobility supine-sit;sit-supine  -     Supine-Sit [place] (Bed Mobility) maximum assist (25% patient effort)  -     Sit-Supine [place] (Bed Mobility) maximum assist (25% patient effort)  -     Bed Mobility, Safety Issues decreased use of arms for pushing/pulling;decreased use of legs for bridging/pushing  -LF       Row Name 08/16/23 1418          Transfers    Transfers sit-stand transfer;stand-sit transfer  -LF       Row Name 08/16/23 1418          Sit-Stand Transfer    Sit-Stand [place] (Transfers) maximum assist (25% patient effort)  -     Assistive Device (Sit-Stand Transfers) walker, front-wheeled  -LF       Row Name 08/16/23 1418          Stand-Sit Transfer    Stand-Sit [place] (Transfers) maximum assist (25% patient effort)  -     Assistive Device (Stand-Sit Transfers) walker, front-wheeled  -[facility] Name 08/16/23 1418          Activities of Daily Living    BADL Assessment/Intervention bathing;upper body dressing;lower body dressing;grooming;feeding;toileting  -LF       Row Name 08/16/23 1418          Bathing Assessment/Intervention    [place] Level (Bathing) bathing skills;upper body;minimum assist (75% patient effort);lower body;maximum assist (25% patient effort)  -[facility] Name 08/16/23 1418          Upper Body Dressing Assessment/Training    [place] Level (Upper Body Dressing) upper body dressing skills;minimum assist (75% patient effort)  -LF       Row Name 08/16/23  1418          Lower Body Dressing Assessment/Training    [place] Level (Lower Body Dressing) lower body dressing skills;maximum assist (25% patient effort)  -       Row Name 08/16/23 1418          Grooming Assessment/Training    [place] Level (Grooming) grooming skills;standby assist  -LF       Row Name 08/16/23 1418          Self-Feeding Assessment/Training    [place] Level (Feeding) feeding skills;set up  -LF       Row Name 08/16/23 1418          Toileting Assessment/Training    [place] Level (Toileting) toileting skills;dependent (less than 25% patient effort)  -     Comment, (Toileting) [place] currently in place  -               User Key  (r) = Recorded By, (t) = Taken By, (c) = Cosigned By      Initials Name Provider Type     Leanne Bazzi OT Occupational Therapist                   Obj/Interventions       [facility] Name 08/16/23 1418          Sensory Assessment (Somatosensory)    Sensory Assessment (Somatosensory) UE sensation intact  -LF       Row Name 08/16/23 1418          Vision Assessment/Intervention    Visual Impairment/Limitations WFL  -LF       Row Name 08/16/23 1418          Range of Motion Comprehensive    General Range of Motion bilateral upper extremity ROM WFL  -LF       Row Name 08/16/23 1418          Strength Comprehensive (MMT)    Comment, General Manual Muscle Testing (MMT) Assessment 4-/5 BUEs  -[facility] Name 08/16/23 1418          Motor Skills    Motor Skills coordination;functional endurance  -     Coordination WFL  -     Functional Endurance Fair-  -LF       Row Name 08/16/23 1418          Balance    Balance Assessment sitting dynamic balance;standing static balance  -     Dynamic Sitting Balance minimal assist  -LF     Position, Sitting Balance unsupported;sitting edge of bed  -     Static Standing Balance moderate assist  -     Position/Device Used, Standing Balance supported;walker, front-wheeled  -               User Key  (r) = Recorded By,  (t) = Taken By, (c) = Cosigned By      Initials Name Provider Type    LF Leanne Bazzi, OT Occupational Therapist                   Goals/Plan       Row Name 08/16/23 1428          Bed Mobility Goal 1 (OT)    Activity/Assistive Device (Bed Mobility Goal 1, OT) bed mobility activities, all  -LF     [place] Level/Cues Needed (Bed Mobility Goal 1, OT) modified independence  -LF     Time Frame (Bed Mobility Goal 1, OT) long term goal (LTG);10 days  -LF       Row Name 08/16/23 1428          Transfer Goal 1 (OT)    Activity/Assistive Device (Transfer Goal 1, OT) transfers, all;walker, rolling  -LF     [place] Level/Cues Needed (Transfer Goal 1, OT) modified independence  -LF     Time Frame (Transfer Goal 1, OT) long term goal (LTG);10 days  -LF       Row Name 08/16/23 1428          Bathing Goal 1 (OT)    Activity/Device (Bathing Goal 1, OT) bathing skills, all;lower body bathing  -LF     [place] Level/Cues Needed (Bathing Goal 1, OT) minimum assist (75% or more patient effort)  -LF     Time Frame (Bathing Goal 1, OT) long term goal (LTG);10 days  -LF       Row Name 08/16/23 1428          Dressing Goal 1 (OT)    Activity/Device (Dressing Goal 1, OT) dressing skills, all;lower body dressing  -LF     [place]/Cues Needed (Dressing Goal 1, OT) minimum assist (75% or more patient effort)  -LF     Time Frame (Dressing Goal 1, OT) long term goal (LTG);10 days  -LF       Row Name 08/16/23 1428          Toileting Goal 1 (OT)    Activity/Device (Toileting Goal 1, OT) toileting skills, all  -LF     [place] Level/Cues Needed (Toileting Goal 1, OT) modified independence  -LF     Time Frame (Toileting Goal 1, OT) long term goal (LTG);10 days  -LF       Row Name 08/16/23 1428          Grooming Goal 1 (OT)    Activity/Device (Grooming Goal 1, OT) grooming skills, all  -LF     [place] (Grooming Goal 1, OT) set-up required  -LF     Time Frame (Grooming Goal 1, OT) long term goal (LTG);10 days  -       Row  Name 08/16/23 1428          Strength Goal 1 (OT)    Strength Goal 1 (OT) Patient will demonstrate 4/5 BUE strength to support ADLs/functional transfers.  -     Time Frame (Strength Goal 1, OT) long term goal (LTG);10 days  -       Row Name 08/16/23 1428          Problem Specific Goal 1 (OT)    Problem Specific Goal 1 (OT) Patient will demonstrate fair/fair+ endurance to support ADLs/functional transfers.  -     Time Frame (Problem Specific Goal 1, OT) long term goal (LTG);10 days  -       Row Name 08/16/23 1428          Therapy Assessment/Plan (OT)    Planned Therapy Interventions (OT) activity tolerance training;patient/caregiver education/training;BADL retraining;functional balance retraining;occupation/activity based interventions;strengthening exercise  -               User Key  (r) = Recorded By, (t) = Taken By, (c) = Cosigned By      Initials Name Provider Type     Leanne Bazzi, OT Occupational Therapist                   Clinical Impression       Row Name 08/16/23 1427          Pain Assessment    Additional Documentation Pain Scale: FACES Pre/Post-Treatment (Group)  -       Row Name 08/16/23 1427          Pain Scale: FACES Pre/Post-Treatment    Pain: FACES Scale, Pretreatment 2-->hurts little bit  -LF     Posttreatment Pain Rating 2-->hurts little bit  -LF     Pain Location generalized  -       Row Name 08/16/23 1427          Plan of Care Review    Plan of Care Reviewed With patient  -     Progress no change  -     Outcome Evaluation Patient presents with limitations in self-care, functional transfers, balance, and endurance. She would benefit from continued skilled occupational therapy services to maximize independence with ADLs/functional transfers.  -       Row Name 08/16/23 1427          Therapy Assessment/Plan (OT)    Patient/Family Therapy Goal Statement (OT) To maximize independence.  -     Rehab Potential (OT) good, to achieve stated therapy goals  -     Criteria for  Skilled Therapeutic Interventions Met (OT) yes;meets criteria;skilled treatment is necessary  -     Therapy Frequency (OT) 5 times/wk  -       Row Name 08/16/23 1427          Therapy Plan Review/Discharge Plan (OT)    Anticipated Discharge Disposition (OT) sub acute care setting  -       Row Name 08/16/23 1427          Vital Signs    O2 Delivery Pre Treatment room air  -LF     O2 Delivery Intra Treatment room air  -LF     O2 Delivery Post Treatment room air  -LF               User Key  (r) = Recorded By, (t) = Taken By, (c) = Cosigned By      Initials Name Provider Type     Leanne Bazzi, OT Occupational Therapist                   Outcome Measures       Row Name 08/16/23 1430          How much help from another is currently needed...    Putting on and taking off regular lower body clothing? 2  -LF     Bathing (including washing, rinsing, and drying) 2  -LF     Toileting (which includes using toilet bed pan or urinal) 1  -LF     Putting on and taking off regular upper body clothing 3  -LF     Taking care of personal grooming (such as brushing teeth) 3  -LF     Eating meals 4  -LF     AM-PAC 6 Clicks Score (OT) 15  -       Row Name 08/16/23 0800          How much help from another person do you currently need...    Turning from your back to your side while in flat bed without using bedrails? 2  -JH     Moving from lying on back to sitting on the side of a flat bed without bedrails? 2  -JH     Moving to and from a bed to a chair (including a wheelchair)? 2  -JH     Standing up from a chair using your arms (e.g., wheelchair, bedside chair)? 2  -JH     Climbing 3-5 steps with a railing? 1  -JH     To walk in hospital room? 2  -JH     AM-PAC 6 Clicks Score (PT) 11  -JH     Highest level of mobility 4 --> Transferred to chair/commode  -       Row Name 08/16/23 1430          Functional Assessment    Outcome Measure Options AM-PAC 6 Clicks Daily Activity (OT);Optimal Instrument  -       Row Name 08/16/23  1430          Optimal Instrument    Optimal Instrument Optimal - 3  -LF     Bending/Stooping 4  -LF     Standing 4  -LF     Reaching 1  -LF     From the list, choose the 3 activities you would most like to be able to do without any difficulty Bending/stooping;Standing;Reaching  -LF     Total Score Optimal - 3 9  -LF               User Key  (r) = Recorded By, (t) = Taken By, (c) = Cosigned By      Initials Name Provider Type    Holly Desai RN Registered Nurse     Leanne Bazzi OT Occupational Therapist                    Occupational Therapy Education       Title: PT OT SLP Therapies (Done)       Topic: Occupational Therapy (Done)       Point: ADL training (Done)       Description:   Instruct learner(s) on proper safety adaptation and remediation techniques during self care or transfers.   Instruct in proper use of assistive devices.                  Learning Progress Summary             Patient Acceptance, E,TB, VU by  at 8/16/2023 1430                         Point: Precautions (Done)       Description:   Instruct learner(s) on prescribed precautions during self-care and functional transfers.                  Learning Progress Summary             Patient Acceptance, E,TB, VU by  at 8/16/2023 1430                         Point: Body mechanics (Done)       Description:   Instruct learner(s) on proper positioning and spine alignment during self-care, functional mobility activities and/or exercises.                  Learning Progress Summary             Patient Acceptance, E,TB, VU by  at 8/16/2023 1430                                         User Key       Initials Effective Dates Name Provider Type Discipline     06/16/21 -  Leanne Bazzi OT Occupational Therapist OT                  OT Recommendation and Plan  Planned Therapy Interventions (OT): activity tolerance training, patient/caregiver education/training, BADL retraining, functional balance retraining, occupation/activity based  interventions, strengthening exercise  Therapy Frequency (OT): 5 times/wk  Plan of Care Review  Plan of Care Reviewed With: patient  Progress: no change  Outcome Evaluation: Patient presents with limitations in self-care, functional transfers, balance, and endurance. She would benefit from continued skilled occupational therapy services to maximize independence with ADLs/functional transfers.     Time Calculation:   Evaluation Complexity (OT)  Review Occupational Profile/Medical/Therapy History Complexity: brief/low complexity  Assessment, Occupational Performance/Identification of Deficit Complexity: 3-5 performance deficits  Clinical Decision Making Complexity (OT): problem focused assessment/low complexity  Overall Complexity of Evaluation (OT): low complexity     Time Calculation- OT       Row Name 08/16/23 1430             Time Calculation- OT    OT Received On 08/16/23  -LF      OT Goal Re-Cert Due Date 08/25/23  -LF         Untimed Charges    OT Eval/Re-eval Minutes 50  -LF         Total Minutes    Untimed Charges Total Minutes 50  -LF       Total Minutes 50  -LF                User Key  (r) = Recorded By, (t) = Taken By, (c) = Cosigned By      Initials Name Provider Type    LF Leanne Bazzi OT Occupational Therapist                  Therapy Charges for Today       Code Description Service Date Service Provider Modifiers Qty    [id number] HC OT EVAL LOW COMPLEXITY 4 8/16/2023 Leanne Bazzi OT GO 1                 Leanne Bazzi OT  8/16/2023

## 2023-08-16 NOTE — PLAN OF CARE
Goal Outcome Evaluation:  Plan of Care Reviewed With: patient           Outcome Evaluation: Patient presents with decreased LE strength and is unable to safely transfer and ambulate independently. Skilled PT is required to address these deficits.      Anticipated Discharge Disposition (PT): sub acute care setting

## 2023-08-17 LAB
ALBUMIN SERPL-MCNC: 2.3 G/DL (ref 3.5–5.2)
ANION GAP SERPL CALCULATED.3IONS-SCNC: 8.8 MMOL/L (ref 5–15)
BASOPHILS # BLD AUTO: 0.04 10*3/MM3 (ref 0–0.2)
BASOPHILS NFR BLD AUTO: 0.6 % (ref 0–1.5)
BUN SERPL-MCNC: 20 MG/DL (ref 8–23)
BUN/CREAT SERPL: 13.3 (ref 7–25)
CALCIUM SPEC-SCNC: 8.2 MG/DL (ref 8.2–9.6)
CHLORIDE SERPL-SCNC: 109 MMOL/L (ref 98–107)
CO2 SERPL-SCNC: 14.2 MMOL/L (ref 22–29)
CREAT SERPL-MCNC: 1.5 MG/DL (ref 0.57–1)
DEPRECATED RDW RBC AUTO: 69.1 FL (ref 37–54)
EGFRCR SERPLBLD CKD-EPI 2021: 32.4 ML/MIN/1.73
EOSINOPHIL # BLD AUTO: 0.13 10*3/MM3 (ref 0–0.4)
EOSINOPHIL NFR BLD AUTO: 1.9 % (ref 0.3–6.2)
ERYTHROCYTE [DISTWIDTH] IN BLOOD BY AUTOMATED COUNT: 19.7 % (ref 12.3–15.4)
GLUCOSE BLDC GLUCOMTR-MCNC: 109 MG/DL (ref 70–99)
GLUCOSE BLDC GLUCOMTR-MCNC: 79 MG/DL (ref 70–99)
GLUCOSE BLDC GLUCOMTR-MCNC: 91 MG/DL (ref 70–99)
GLUCOSE BLDC GLUCOMTR-MCNC: 99 MG/DL (ref 70–99)
GLUCOSE SERPL-MCNC: 85 MG/DL (ref 65–99)
HCT VFR BLD AUTO: 37 % (ref 34–46.6)
HGB BLD-MCNC: 11 G/DL (ref 12–15.9)
IMM GRANULOCYTES # BLD AUTO: 0.06 10*3/MM3 (ref 0–0.05)
IMM GRANULOCYTES NFR BLD AUTO: 0.9 % (ref 0–0.5)
LYMPHOCYTES # BLD AUTO: 2.22 10*3/MM3 (ref 0.7–3.1)
LYMPHOCYTES NFR BLD AUTO: 32.1 % (ref 19.6–45.3)
MCH RBC QN AUTO: 30.6 PG (ref 26.6–33)
MCHC RBC AUTO-ENTMCNC: 29.7 G/DL (ref 31.5–35.7)
MCV RBC AUTO: 103.1 FL (ref 79–97)
MONOCYTES # BLD AUTO: 1.17 10*3/MM3 (ref 0.1–0.9)
MONOCYTES NFR BLD AUTO: 16.9 % (ref 5–12)
NEUTROPHILS NFR BLD AUTO: 3.29 10*3/MM3 (ref 1.7–7)
NEUTROPHILS NFR BLD AUTO: 47.6 % (ref 42.7–76)
NRBC BLD AUTO-RTO: 0 /100 WBC (ref 0–0.2)
PHOSPHATE SERPL-MCNC: 3.2 MG/DL (ref 2.5–4.5)
PLATELET # BLD AUTO: 200 10*3/MM3 (ref 140–450)
PMV BLD AUTO: 9.1 FL (ref 6–12)
POTASSIUM SERPL-SCNC: 4.2 MMOL/L (ref 3.5–5.2)
RBC # BLD AUTO: 3.59 10*6/MM3 (ref 3.77–5.28)
SODIUM SERPL-SCNC: 132 MMOL/L (ref 136–145)
WBC NRBC COR # BLD: 6.91 10*3/MM3 (ref 3.4–10.8)

## 2023-08-17 PROCEDURE — 85025 COMPLETE CBC W/AUTO DIFF WBC: CPT | Performed by: INTERNAL MEDICINE

## 2023-08-17 PROCEDURE — 80069 RENAL FUNCTION PANEL: CPT | Performed by: INTERNAL MEDICINE

## 2023-08-17 PROCEDURE — 82948 REAGENT STRIP/BLOOD GLUCOSE: CPT

## 2023-08-17 PROCEDURE — 99232 SBSQ HOSP IP/OBS MODERATE 35: CPT | Performed by: INTERNAL MEDICINE

## 2023-08-17 RX ORDER — SODIUM BICARBONATE 650 MG/1
650 TABLET ORAL 3 TIMES DAILY
Status: DISCONTINUED | OUTPATIENT
Start: 2023-08-17 | End: 2023-08-24 | Stop reason: HOSPADM

## 2023-08-17 RX ADMIN — WHITE PETROLATUM 1 APPLICATION: 1.75 OINTMENT TOPICAL at 09:58

## 2023-08-17 RX ADMIN — FAMOTIDINE 10 MG: 10 TABLET ORAL at 09:50

## 2023-08-17 RX ADMIN — SODIUM BICARBONATE 650 MG TABLET 650 MG: at 22:13

## 2023-08-17 RX ADMIN — SENNOSIDES AND DOCUSATE SODIUM 2 TABLET: 50; 8.6 TABLET ORAL at 09:50

## 2023-08-17 RX ADMIN — SODIUM BICARBONATE 650 MG TABLET 650 MG: at 09:50

## 2023-08-17 RX ADMIN — MICONAZOLE NITRATE: 2 POWDER TOPICAL at 22:13

## 2023-08-17 RX ADMIN — LEVOTHYROXINE SODIUM 100 MCG: 0.1 TABLET ORAL at 05:00

## 2023-08-17 RX ADMIN — SENNOSIDES AND DOCUSATE SODIUM 2 TABLET: 50; 8.6 TABLET ORAL at 22:13

## 2023-08-17 RX ADMIN — SODIUM BICARBONATE 650 MG TABLET 650 MG: at 16:26

## 2023-08-17 RX ADMIN — WHITE PETROLATUM 1 APPLICATION: 1.75 OINTMENT TOPICAL at 22:13

## 2023-08-17 RX ADMIN — MICONAZOLE NITRATE: 2 POWDER TOPICAL at 09:58

## 2023-08-17 NOTE — PLAN OF CARE
Goal Outcome Evaluation:      VSS.  No acute changes this shift.  No signs of distress noted at this time.

## 2023-08-17 NOTE — PROGRESS NOTES
[facility]   Hospitalist Progress Note  Date: 2023  Patient Name: Jayashree Longoria  : 1929  MRN: [id number]  Date of admission: 2023      Subjective   Subjective     Chief Complaint: Fall and found down     Summary:   89 y.o. female with a past medical history of  type 2 diabetes mellitus, hypothyroidism, hyperlipidemia presented to the ED for evaluation of low back pain.  Patient was found to be on the floor by a friend who regularly checks on her.  Patient said that she has fell yesterday while walking.  Unsure why she fell.  Denies any lightheadedness, palpitations prior to the fall.  Unsure if she hit her head or not.  Was not able to get up and was lying on the floor.  Patient states that she has not seen a doctor in the last few years.  Positive for lower back pain.  Denies any fevers, chills, abdominal pain, nausea, vomiting, focal weakness, numbness, tingling.     In the ED, vital signs temperature 97.9, pulse 82, respiratory 18, blood pressure 117/79 on room air saturating around 94%.  Labs showed initial troponin 63, sodium 136, potassium 3.5, bicarb 17.1, creatinine 2.21, 3 years ago was 1.3, magnesium 2.7, rest of the CMP within normal limits, WBC 14.17, normal hemoglobin, normal platelets, urinalysis does not look infected, chest x-ray did not show any acute cardiopulmonary process.  X-ray of the lumbar spine showed compression fraction of the L1 of indeterminate age.  Large left paravertebral mass with peripheral calvarial calcifications may represent an abdominal aortic aneurysm.  CT head without contrast did not show any acute intracranial abnormality.  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  No acute retroperitoneal hemorrhage is seen to suggest aneurysm leakage or rupture.  Vertebral compression fracture at the T11 and L1 is noted.  Small bilateral pleural effusions are noted.  Case has been discussed by the ED  physician with vascular surgeon on-call Dr. Vickers, will be evaluating the patient in the morning and discuss regarding the management options for abdominal aortic aneurysm.  Received IV fluids in the ED.  Patient has been admitted for further evaluation and management of abdominal aortic aneurysm, HANNAH, dehydration.       Interval Followup: Patient awake and alert overall improved increasing sodium bicarbonate tabs for metabolic acidosis evidently will be having hospice meeting later if care has been consulted patient is DO NOT RESUSCITATE DO NOT INTUBATE.  Patient does have large abdominal aortic aneurysm seen by vascular surgery not a surgical candidate given her advanced age and quality of life overall it appears patient would be appropriate for hospice level of care.  At the very least patient will require inpatient rehab at time of discharge.    Review of Systems  All systems reviewed negative septa following: Patient complains of generalized weakness fatigue    Objective   Objective     Vitals:   Temp:  [97.2 øF (36.2 øC)-98.8 øF (37.1 øC)] 97.2 øF (36.2 øC)  Heart Rate:  [80-96] 87  Resp:  [16-18] 17  BP: ()/() 134/67  Physical Exam    Constitutional:awake, alert answering questions   Eyes: Pupils equal, sclerae anicteric, no conjunctival injection   HENT: NCAT, mucous membranes moist   Neck: Supple, no thyromegaly, no lymphadenopathy, trachea midline   Respiratory: Clear to auscultation bilaterally, nonlabored respirations    Cardiovascular: RRR, no murmurs, rubs, or gallops, palpable pedal pulses bilaterally   Gastrointestinal: Positive bowel sounds, soft, nontender, nondistended   Musculoskeletal: No bilateral ankle edema, no clubbing or cyanosis to extremities   Psychiatric: Appropriate affect, cooperative   Neurologic: Oriented x 1; speech clear   Skin: No rashes     Result Review    Result Review:  I have reviewed the following:  [x]  Laboratory  CBC          8/15/2023    04:41 8/16/2023     04:32 8/17/2023    04:47   CBC   WBC 8.77  7.76  6.91    RBC 3.45  3.30  3.59    Hemoglobin 10.8  10.1  11.0    Hematocrit 33.8  32.3  37.0    MCV 98.0  97.9  103.1    MCH 31.3  30.6  30.6    MCHC 32.0  31.3  29.7    RDW 19.7  19.2  19.7    Platelets 207  220  200      BMP          8/15/2023    04:41 8/16/2023    04:32 8/17/2023    04:47   BMP   BUN 31  26  20    Creatinine 1.44  1.54  1.50    Sodium 139  135  132    Potassium 4.0  4.1  4.2    Chloride 112  109  109    CO2 17.3  15.7  14.2    Calcium 8.6  8.3  8.2    [x]  Microbiology  No results found for: ACANTHNAEG, AFBCX, BPERTUSSISCX, BLOODCX  No results found for: BCIDPCR, CXREFLEX, CSFCX, CULTURETIS  No results found for: CULTURES, HSVCX, URCX  No results found for: EYECULTURE, GCCX, HSVCULTURE, LABHSV  No results found for: LEGIONELLA, MRSACX, MUMPSCX, MYCOPLASCX  No results found for: NOCARDIACX, STOOLCX  No results found for: THROATCX, UNSTIMCULT, URINECX, CULTURE, VZVCULTUR  No results found for: VIRALCULTU, WOUNDCX    [x]  Radiology     CT Abdomen Pelvis Without Contrast    Result Date: 8/11/2023  PROCEDURE: CT ABDOMEN PELVIS WO CONTRAST  COMPARISON: 8/11/2023.  INDICATIONS: Aneurysm on x-ray with back pain; generalized lower back pain.  TECHNIQUE: 728 CT images were created without intravenous contrast.   PROTOCOL:   Standard CT imaging protocol performed.    RADIATION:   Total DLP: 599.5 mGy*cm.   Automated exposure control was utilized to minimize radiation dose.  FINDINGS: There is a large (7.6 cm) infrarenal abdominal aortic aneurysm, probably fusiform in configuration.  The aneurysm neck is roughly estimated at 3 cm, as on image 106 of series 202.  The fusiform aneurysm extends is about 9.4 cm caudally to just above the aortic bifurcation.  No acute retroperitoneal or intraperitoneal hemorrhage is seen to suggest aneurysm leakage or rupture.  There may be mild fusiform dilatation of the bilateral common iliac arteries.  The left common iliac artery  measures about 1.6 cm in greatest diameter.  The right common iliac artery measures about 1.5 cm in greatest diameter.  The proximal left internal iliac artery measures about 1.4 cm in greatest diameter.  The proximal right internal iliac artery measures about 1.1 cm in greatest diameter.  Extensive atherosclerotic changes are seen.  For instance, extensive arterial calcification involves the proximal bilateral renal arteries.  Minimal calcified plaque is seen at the origins of the celiac trunk and superior mesenteric artery.  The inferior mesenteric artery is not well evaluated by this study; it appears to arise from the lower left anterior margin of the large fusiform aneurysm.   Age-indeterminate vertebral compression fractures are seen at T11 and L1, estimated at 30 percent or less in degree.  Vacuum phenomenon involves the disc interspaces at T11-12, T12-L1, L1-2, L2-3, L3-4, and L4-5.  Severe degenerative changes are seen throughout the imaged spine.  There is suspected levoscoliosis of the lumbar spine.  Chronic malalignment (at several levels) is seen throughout the lumbar spine.   Additionally, colonic diverticulosis is seen without acute diverticulitis.  No acute appendicitis.  No mechanical bowel obstruction.  No pneumoperitoneum or pneumatosis.  The patient has undergone cholecystectomy.  No acute pancreatitis.  Small to moderate sized hiatal hernia is seen.  There are coronary artery calcifications.  No cardiac enlargement.  A trace amount of pericardial effusion is seen.  There may be tiny bilateral pleural effusions.  Motion artifact obscures detail.  Mild prominence of the adrenal glands is seen, which is nonspecific.  No definite hydronephrosis, nephrolithiasis, or ureterolithiasis.  There are arterial calcifications in close proximity to the distal ureters.  It appears that the patient has undergone hysterectomy.  Degenerative changes involve the bilateral hip joints and the bilateral sacroiliac  joints.  There is mild distension of the urinary bladder.      Impression:    1. There is a 7.6 cm fusiform aneurysm of the infrarenal abdominal aorta, as discussed.  No acute retroperitoneal hemorrhage is seen to suggest aneurysm leakage or rupture.   2. Age-indeterminate (but possibly subacute) vertebral compression fractures are seen at T11 and L1, estimated at 30 percent or less in degree.  There is minimal if any posterior wall retropulsion associated with these findings.   3. There are small bilateral pleural effusions.   4. The patient has undergone hysterectomy.   5. Probably no significant acute findings are seen otherwise.   6. Please see above comments for further detail.     A preliminary report (regarding the abdominal aortic aneurysm, AAA) was given to Dr. Moya (ordering/attending [facility] ED Physician) at approximately 2205 hours on 8/11/2023.   Please note that portions of this note were completed with a voice recognition program.  VALERIA FRIEDMAN JR, MD       Electronically Signed and Approved By: VALERIA FRIEDMAN JR, MD on 8/11/2023 at 22:18                 []  EKG/Telemetry   []  Cardiology/Vascular   []  Pathology  []  Old records  []  Other:    Assessment & Plan   Assessment / Plan     Assessment:  DM Type 2  Hypothyroid--uncontrolled  Suspect Dementia  HLD  Fall at home   7.6 aneurysm of the infrarenal abdominal aorta  vertebral compression fractures are seen at T11 and L1  Hypothermia 96.1    Plan:  Vascular surgery consulted for aneurysm. Patient declines at the time and she is not a good operative candidate due to advanced age/quality of life  Increased dose of sodium bicarbonate  Monitor chemistries for renal function electrolytes  Monitor CBC  Continue with miconazole for groin rash  Continue  increased dose of her Synthroid as her thyroid studies are abnormal  Continue sliding scale insulin for diabetes  Palliative consulted  [facility] 8/16/2023 long-term prognosis poor/guarded would be  appropriate for hospice level of care.  Discussed with RN discussed with patient patient's friend at bedside    DVT prophylaxis:  No DVT prophylaxis order currently exists.    CODE STATUS:   Medical Intervention Limits: NO intubation (DNI)  Level Of Support Discussed With: Patient  Code Status (Patient has no pulse and is not breathing): No CPR (Do Not Attempt to Resuscitate)  Medical Interventions (Patient has pulse or is breathing): Limited Support  Electronically signed by DIEGO Coreas, 08/17/23, 2:17 PM EDT.        Attending Documentation:  Patient independently seen and evaluated, above documentation reflects plan put forth during bedside rounds.  More than 51% of the time of this patient encounter was performed by me. I discussed the care plan with JULIANO Ely PA-C, I agree with his findings and plan as documented, what I have added to the care plan and modified is as follows in my documentation and my medical decision making; 89-year-old female with a host of medical problems presented with low back pain, found to have aneurysm, declined vascular surgery evaluation/intervention at this time.  Nephew visiting on Monday to decide on a nursing facility.  Patient denies complaints today and says that she is doing pretty well to be 89.Electronically signed by Maikol Giles MD, 08/17/23, 7:02 PM EDT.

## 2023-08-17 NOTE — PLAN OF CARE
Goal Outcome Evaluation:      Pt is pleasantly confused and able to follow commands. Very hard of hearing. No c/o pain or discomfort. VSS. Kasie Clark RN

## 2023-08-18 LAB
ALBUMIN SERPL-MCNC: 2.7 G/DL (ref 3.5–5.2)
ANION GAP SERPL CALCULATED.3IONS-SCNC: 10 MMOL/L (ref 5–15)
BUN SERPL-MCNC: 18 MG/DL (ref 8–23)
BUN/CREAT SERPL: 11.8 (ref 7–25)
CALCIUM SPEC-SCNC: 8.1 MG/DL (ref 8.2–9.6)
CHLORIDE SERPL-SCNC: 108 MMOL/L (ref 98–107)
CO2 SERPL-SCNC: 20 MMOL/L (ref 22–29)
CREAT SERPL-MCNC: 1.53 MG/DL (ref 0.57–1)
DEPRECATED RDW RBC AUTO: 70.8 FL (ref 37–54)
EGFRCR SERPLBLD CKD-EPI 2021: 31.6 ML/MIN/1.73
ERYTHROCYTE [DISTWIDTH] IN BLOOD BY AUTOMATED COUNT: 19 % (ref 12.3–15.4)
GLUCOSE BLDC GLUCOMTR-MCNC: 100 MG/DL (ref 70–99)
GLUCOSE BLDC GLUCOMTR-MCNC: 123 MG/DL (ref 70–99)
GLUCOSE BLDC GLUCOMTR-MCNC: 143 MG/DL (ref 70–99)
GLUCOSE BLDC GLUCOMTR-MCNC: 83 MG/DL (ref 70–99)
GLUCOSE SERPL-MCNC: 96 MG/DL (ref 65–99)
HCT VFR BLD AUTO: 34.1 % (ref 34–46.6)
HGB BLD-MCNC: 10.2 G/DL (ref 12–15.9)
MAGNESIUM SERPL-MCNC: 2 MG/DL (ref 1.7–2.3)
MCH RBC QN AUTO: 30.8 PG (ref 26.6–33)
MCHC RBC AUTO-ENTMCNC: 29.9 G/DL (ref 31.5–35.7)
MCV RBC AUTO: 103 FL (ref 79–97)
PHOSPHATE SERPL-MCNC: 2.9 MG/DL (ref 2.5–4.5)
PLATELET # BLD AUTO: 212 10*3/MM3 (ref 140–450)
PMV BLD AUTO: 8.6 FL (ref 6–12)
POTASSIUM SERPL-SCNC: 4 MMOL/L (ref 3.5–5.2)
RBC # BLD AUTO: 3.31 10*6/MM3 (ref 3.77–5.28)
SODIUM SERPL-SCNC: 138 MMOL/L (ref 136–145)
WBC NRBC COR # BLD: 5.79 10*3/MM3 (ref 3.4–10.8)

## 2023-08-18 PROCEDURE — 82948 REAGENT STRIP/BLOOD GLUCOSE: CPT

## 2023-08-18 PROCEDURE — 85027 COMPLETE CBC AUTOMATED: CPT | Performed by: PHYSICIAN ASSISTANT

## 2023-08-18 PROCEDURE — 80069 RENAL FUNCTION PANEL: CPT | Performed by: PHYSICIAN ASSISTANT

## 2023-08-18 PROCEDURE — 97116 GAIT TRAINING THERAPY: CPT

## 2023-08-18 PROCEDURE — 83735 ASSAY OF MAGNESIUM: CPT | Performed by: PHYSICIAN ASSISTANT

## 2023-08-18 PROCEDURE — 99232 SBSQ HOSP IP/OBS MODERATE 35: CPT | Performed by: INTERNAL MEDICINE

## 2023-08-18 RX ORDER — CHOLECALCIFEROL (VITAMIN D3) 125 MCG
10 CAPSULE ORAL NIGHTLY PRN
Status: DISCONTINUED | OUTPATIENT
Start: 2023-08-18 | End: 2023-08-24 | Stop reason: HOSPADM

## 2023-08-18 RX ORDER — TROLAMINE SALICYLATE 10 G/100G
1 CREAM TOPICAL 2 TIMES DAILY PRN
Status: DISCONTINUED | OUTPATIENT
Start: 2023-08-18 | End: 2023-08-24 | Stop reason: HOSPADM

## 2023-08-18 RX ADMIN — ACETAMINOPHEN 650 MG: 325 TABLET ORAL at 10:56

## 2023-08-18 RX ADMIN — ACETAMINOPHEN 650 MG: 325 TABLET ORAL at 04:32

## 2023-08-18 RX ADMIN — SODIUM BICARBONATE 650 MG TABLET 650 MG: at 20:15

## 2023-08-18 RX ADMIN — FAMOTIDINE 10 MG: 10 TABLET ORAL at 08:54

## 2023-08-18 RX ADMIN — Medication 1 APPLICATION: at 10:56

## 2023-08-18 RX ADMIN — WHITE PETROLATUM 1 APPLICATION: 1.75 OINTMENT TOPICAL at 22:23

## 2023-08-18 RX ADMIN — LEVOTHYROXINE SODIUM 100 MCG: 0.1 TABLET ORAL at 06:06

## 2023-08-18 RX ADMIN — WHITE PETROLATUM 1 APPLICATION: 1.75 OINTMENT TOPICAL at 09:36

## 2023-08-18 RX ADMIN — SODIUM BICARBONATE 650 MG TABLET 650 MG: at 15:57

## 2023-08-18 RX ADMIN — MICONAZOLE NITRATE: 2 POWDER TOPICAL at 20:15

## 2023-08-18 RX ADMIN — SODIUM BICARBONATE 650 MG TABLET 650 MG: at 08:54

## 2023-08-18 RX ADMIN — Medication 10 MG: at 02:06

## 2023-08-18 RX ADMIN — MICONAZOLE NITRATE: 2 POWDER TOPICAL at 09:36

## 2023-08-18 NOTE — PLAN OF CARE
Goal Outcome Evaluation:      Pt slept all night. She reported having leg pain and was given tylenol for the pain and it was effective. Pt assisted with call bell to be able to listen to the tv.  Pt slept most of the shift.

## 2023-08-18 NOTE — PLAN OF CARE
Plan of care is going. No further concerns as of present. Patient expresses no other needs at this time. Call light is within reach.     Juan Villela RN

## 2023-08-18 NOTE — PROGRESS NOTES
[facility]   Hospitalist Progress Note  Date: 2023  Patient Name: Jayashree Longoria  : 1929  MRN: [id number]  Date of admission: 2023      Subjective   Subjective     Chief Complaint: Fall and found down     Summary:   Patient 89-year-old female past medical history significant for type 2 diabetes hypertension hyper hyperlipidemia hypothyroidism lives on her own is checked on regularly by her friend she presents to the ED after being found on the floor in the emergency department patient was evaluated noted to be dehydrated with HANNAH had CT scan of the head with contrast which showed no acute intracranial abnormality CT scan abdomen pelvis showed 7.6 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  Patient mated to a monitored bed given IV fluids with improvement of her renal function additionally given large aortic aneurysm vascular surgery consulted given the patient's advanced age and poor quality of life it was not felt that surgical intervention was indicated.  Patient has been seen by physical therapy and Occupational Therapy she is very weak will likely need rehab.  Additionally given her advanced age and poor quality of life palliative care has been consulted patient is a DNR hospice referral for information made.  Currently patient is improved/stable mental status improved awaiting patient's nephew/POA to arrive from out of town to make further decisions.       Interval Followup: Patient seen and examined resting comfortably conversational denies chest pain shortness of air abdominal pain no confusion or agitation renal function stable.      Review of Systems  All systems reviewed negative except the following: Patient complains of generalized weakness fatigue    Objective   Objective     Vitals:   Temp:  [97.9 øF (36.6 øC)-98.4 øF  (36.9 øC)] 98.1 øF (36.7 øC)  Heart Rate:  [81-90] 90  Resp:  [16-18] 18  BP: (115-139)/(63-77) 132/65  Physical Exam    Constitutional:awake, alert answering questions   Eyes: Pupils equal, sclerae anicteric, no conjunctival injection   HENT: NCAT, mucous membranes moist   Neck: Supple, no thyromegaly, no lymphadenopathy, trachea midline   Respiratory: Clear to auscultation bilaterally, nonlabored respirations    Cardiovascular: RRR, no murmurs, rubs, or gallops, palpable pedal pulses bilaterally   Gastrointestinal: Positive bowel sounds, soft, nontender, nondistended   Musculoskeletal: No bilateral ankle edema, no clubbing or cyanosis to extremities   Psychiatric: Appropriate affect, cooperative   Neurologic: Awake alert oriented no acute distress no focal deficits   skin: No rashes     Result Review    Result Review:  I have reviewed the following:  [x]  Laboratory  CBC          8/16/2023    04:32 8/17/2023    04:47 8/18/2023    04:06   CBC   WBC 7.76  6.91  5.79    RBC 3.30  3.59  3.31    Hemoglobin 10.1  11.0  10.2    Hematocrit 32.3  37.0  34.1    MCV 97.9  103.1  103.0    MCH 30.6  30.6  30.8    MCHC 31.3  29.7  29.9    RDW 19.2  19.7  19.0    Platelets 220  200  212      BMP          8/16/2023    04:32 8/17/2023    04:47 8/18/2023    04:06   BMP   BUN 26  20  18    Creatinine 1.54  1.50  1.53    Sodium 135  132  138    Potassium 4.1  4.2  4.0    Chloride 109  109  108    CO2 15.7  14.2  20.0    Calcium 8.3  8.2  8.1    [x]  Microbiology  No results found for: ACANTHNAEG, AFBCX, BPERTUSSISCX, BLOODCX  No results found for: BCIDPCR, CXREFLEX, CSFCX, CULTURETIS  No results found for: CULTURES, HSVCX, URCX  No results found for: EYECULTURE, GCCX, HSVCULTURE, LABHSV  No results found for: LEGIONELLA, MRSACX, MUMPSCX, MYCOPLASCX  No results found for: NOCARDIACX, STOOLCX  No results found for: THROATCX, UNSTIMCULT, URINECX, CULTURE, VZVCULTUR  No results found for: VIRALCULTU, WOUNDCX    [x]  Radiology     CT  Abdomen Pelvis Without Contrast    Result Date: 8/11/2023  PROCEDURE: CT ABDOMEN PELVIS WO CONTRAST  COMPARISON: 8/11/2023.  INDICATIONS: Aneurysm on x-ray with back pain; generalized lower back pain.  TECHNIQUE: 728 CT images were created without intravenous contrast.   PROTOCOL:   Standard CT imaging protocol performed.    RADIATION:   Total DLP: 599.5 mGy*cm.   Automated exposure control was utilized to minimize radiation dose.  FINDINGS: There is a large (7.6 cm) infrarenal abdominal aortic aneurysm, probably fusiform in configuration.  The aneurysm neck is roughly estimated at 3 cm, as on image 106 of series 202.  The fusiform aneurysm extends is about 9.4 cm caudally to just above the aortic bifurcation.  No acute retroperitoneal or intraperitoneal hemorrhage is seen to suggest aneurysm leakage or rupture.  There may be mild fusiform dilatation of the bilateral common iliac arteries.  The left common iliac artery measures about 1.6 cm in greatest diameter.  The right common iliac artery measures about 1.5 cm in greatest diameter.  The proximal left internal iliac artery measures about 1.4 cm in greatest diameter.  The proximal right internal iliac artery measures about 1.1 cm in greatest diameter.  Extensive atherosclerotic changes are seen.  For instance, extensive arterial calcification involves the proximal bilateral renal arteries.  Minimal calcified plaque is seen at the origins of the celiac trunk and superior mesenteric artery.  The inferior mesenteric artery is not well evaluated by this study; it appears to arise from the lower left anterior margin of the large fusiform aneurysm.   Age-indeterminate vertebral compression fractures are seen at T11 and L1, estimated at 30 percent or less in degree.  Vacuum phenomenon involves the disc interspaces at T11-12, T12-L1, L1-2, L2-3, L3-4, and L4-5.  Severe degenerative changes are seen throughout the imaged spine.  There is suspected levoscoliosis of the  lumbar spine.  Chronic malalignment (at several levels) is seen throughout the lumbar spine.   Additionally, colonic diverticulosis is seen without acute diverticulitis.  No acute appendicitis.  No mechanical bowel obstruction.  No pneumoperitoneum or pneumatosis.  The patient has undergone cholecystectomy.  No acute pancreatitis.  Small to moderate sized hiatal hernia is seen.  There are coronary artery calcifications.  No cardiac enlargement.  A trace amount of pericardial effusion is seen.  There may be tiny bilateral pleural effusions.  Motion artifact obscures detail.  Mild prominence of the adrenal glands is seen, which is nonspecific.  No definite hydronephrosis, nephrolithiasis, or ureterolithiasis.  There are arterial calcifications in close proximity to the distal ureters.  It appears that the patient has undergone hysterectomy.  Degenerative changes involve the bilateral hip joints and the bilateral sacroiliac joints.  There is mild distension of the urinary bladder.      Impression:    1. There is a 7.6 cm fusiform aneurysm of the infrarenal abdominal aorta, as discussed.  No acute retroperitoneal hemorrhage is seen to suggest aneurysm leakage or rupture.   2. Age-indeterminate (but possibly subacute) vertebral compression fractures are seen at T11 and L1, estimated at 30 percent or less in degree.  There is minimal if any posterior wall retropulsion associated with these findings.   3. There are small bilateral pleural effusions.   4. The patient has undergone hysterectomy.   5. Probably no significant acute findings are seen otherwise.   6. Please see above comments for further detail.     A preliminary report (regarding the abdominal aortic aneurysm, AAA) was given to Dr. Moya (ordering/attending [facility] ED Physician) at approximately 2205 hours on 8/11/2023.   Please note that portions of this note were completed with a voice recognition program.  VALERIA FRIEDMAN JR, MD       Electronically Signed and  Approved By: VALERIA FRIEDMAN JR, MD on 8/11/2023 at 22:18                 []  EKG/Telemetry   []  Cardiology/Vascular   []  Pathology  []  Old records  []  Other:    Assessment & Plan   Assessment / Plan     Assessment:  DM Type 2  Hypothyroid--uncontrolled  Suspect Dementia  HLD  Fall at home   7.6 aneurysm of the infrarenal abdominal aorta  vertebral compression fractures are seen at T11 and L1  Hypothermia 96.1    Plan:  Vascular surgery consulted for aneurysm. Patient declines surgery at thistime and she is not a good operative candidate due to advanced age/quality of life  Continue with sodium bicarbonate pills  Monitor chemistries for renal function electrolytes  Monitor CBC  Continue with miconazole for groin rash  Continue  increased dose of her Synthroid as her thyroid studies are abnormal  Continue sliding scale insulin for diabetes  Palliative consulted  [facility] 8/16/2023 long-term prognosis poor/guarded would be appropriate for hospice level of care.  Discussed with RN discussed with patient patient's friend at bedside    DVT prophylaxis:  No DVT prophylaxis order currently exists.    CODE STATUS:   Medical Intervention Limits: NO intubation (DNI)  Level Of Support Discussed With: Patient  Code Status (Patient has no pulse and is not breathing): No CPR (Do Not Attempt to Resuscitate)  Medical Interventions (Patient has pulse or is breathing): Limited Support  Electronically signed by DIEGO Coreas, 08/18/23, 1:52 PM EDT.          Attending Documentation:  Patient independently seen and evaluated, above documentation reflects plan put forth during bedside rounds.  More than 51% of the time of this patient encounter was performed by me. I discussed the care plan with JULIANO Ely PA-C, I agree with his findings and plan as documented, what I have added to the care plan and modified is as follows in my documentation and my medical decision making; 89-year-old female with a host of medical  problems presented with low back pain, found to have aneurysm, declined vascular surgery evaluation/intervention at this time.  She has been stable, consult placed for hospice referral.  They prefer to go to nursing facility.  I am told that her nephew is coming into town and will look at facilities on Monday to make a decision.  Electronically signed by Maikol Giles MD, 08/18/23, 6:39 PM EDT.

## 2023-08-18 NOTE — THERAPY EVALUATION
Acute Care - Physical Therapy Initial Evaluation   Becky     Patient Name: Jayashree Longoria  : 1929  MRN: [id number]  Today's Date: 2023     Admit date: 2023     Referring Physician: Maikol Giles MD     Surgery Date:* No surgery found *             Visit Dx:     ICD-10-CM ICD-9-CM   1. Fall, initial encounter  W19.XXXA E888.9   2. Dehydration  E86.0 276.51   3. HANNAH (acute kidney injury)  N17.9 584.9   4. Abdominal aortic aneurysm (AAA) without rupture, unspecified part  I71.40 441.4   5. Decreased activities of daily living (ADL)  Z78.9 V49.89   6. Difficulty in walking  R26.2 719.7     Patient Active Problem List   Diagnosis    HANNAH (acute kidney injury)    Hypothermia     Past Medical History:   Diagnosis Date    Diabetes mellitus     Disease of thyroid gland     Hyperlipidemia     Hypertension     Renal disorder      History reviewed. No pertinent surgical history.  PT Assessment (last 12 hours)       PT Evaluation and Treatment       Row Name 23 1300          Physical Therapy Time and Intention    Subjective Information no complaints  -REBECCA     Document Type therapy note (daily note)  -REBECCA     Patient Effort good  -REBECCA       Row Name 23 1300          General Information    Patient Observations alert;cooperative;agree to therapy  -REBECCA       Row Name 23 1300          Bed Mobility    All Activities, [place] (Bed Mobility) minimum assist (75% patient effort)  -REBECCA     Supine-Sit [place] (Bed Mobility) minimum assist (75% patient effort)  -REBECCA       Row Name 23 1300          Transfers    Transfers sit-stand transfer  -REBECCA       Row Name 23 1300          Sit-Stand Transfer    Sit-Stand [place] (Transfers) minimum assist (75% patient effort)  -REBECCA     Assistive Device (Sit-Stand Transfers) walker, front-wheeled  -REBECCA       Row Name 23 1300          Gait/Stairs (Locomotion)    Gait/Stairs Locomotion gait/ambulation assistive device  -REBECCA     [place]  Level (Gait) contact guard  -REBECCA     Assistive Device (Gait) walker, front-wheeled  -REBECCA     Distance in Feet (Gait) 40  -REBECCA       Row Name 08/18/23 1300          Safety Issues, Functional Mobility    Impairments Affecting Function (Mobility) balance;pain;strength  -REBECCA       Row Name 08/18/23 1300          Balance    Dynamic Standing Balance contact guard  -REBECCA     Position/Device Used, Standing Balance walker, front-wheeled  -REBECCA       Row Name             Wound 08/12/23 0053 Bilateral anterior abdomen MASD (Moisture associated skin damage)    Wound - Properties Group Placement Date: 08/12/23  -AK Placement Time: 0053  -AK Present on Hospital Admission: Y  -AK Side: Bilateral  -AK Orientation: anterior  -AK Location: abdomen  -AK Primary Wound Type: MASD  -AK    Retired Wound - Properties Group Placement Date: 08/12/23  -AK Placement Time: 0053  -AK Present on Hospital Admission: Y  -AK Side: Bilateral  -AK Orientation: anterior  -AK Location: abdomen  -AK Primary Wound Type: MASD  -AK    Retired Wound - Properties Group Date first assessed: 08/12/23  -AK Time first assessed: 0053  -AK Present on Hospital Admission: Y  -AK Side: Bilateral  -AK Location: abdomen  -AK Primary Wound Type: MASD  -AK      Row Name             Wound 08/12/23 0054 Left posterior plantar    Wound - Properties Group Placement Date: 08/12/23  -AK Placement Time: 0054  -AK Side: Left  -AK Orientation: posterior  -AK Location: plantar  -AK    Retired Wound - Properties Group Placement Date: 08/12/23  -AK Placement Time: 0054  -AK Side: Left  -AK Orientation: posterior  -AK Location: plantar  -AK    Retired Wound - Properties Group Date first assessed: 08/12/23  -AK Time first assessed: 0054  -AK Side: Left  -AK Location: plantar  -AK      Row Name             Wound 08/12/23 0055 Bilateral anterior fourth toe Abrasion    Wound - Properties Group Placement Date: 08/12/23  -AK Placement Time: 0055  -AK Present on Hospital Admission: Y  -AK Side:  Bilateral  -AK Orientation: anterior  -AK Location: fourth toe  -AK Primary Wound Type: Abrasion  -AK    Retired Wound - Properties Group Placement Date: 08/12/23  -AK Placement Time: 0055  -AK Present on Hospital Admission: Y  -AK Side: Bilateral  -AK Orientation: anterior  -AK Location: fourth toe  -AK Primary Wound Type: Abrasion  -AK    Retired Wound - Properties Group Date first assessed: 08/12/23  -AK Time first assessed: 0055  -AK Present on Hospital Admission: Y  -AK Side: Bilateral  -AK Location: fourth toe  -AK Primary Wound Type: Abrasion  -AK      Row Name             Wound 08/12/23 0056 Bilateral posterior gluteal    Wound - Properties Group Placement Date: 08/12/23  -AK Placement Time: 0056  -AK Present on Hospital Admission: Y  -AK Side: Bilateral  -AK Orientation: posterior  -AK Location: gluteal  -AK    Retired Wound - Properties Group Placement Date: 08/12/23  -AK Placement Time: 0056  -AK Present on Hospital Admission: Y  -AK Side: Bilateral  -AK Orientation: posterior  -AK Location: gluteal  -AK    Retired Wound - Properties Group Date first assessed: 08/12/23  -AK Time first assessed: 0056  -AK Present on Hospital Admission: Y  -AK Side: Bilateral  -AK Location: gluteal  -AK      Row Name             Wound 08/12/23 0104 Right anterior foot Traumatic    Wound - Properties Group Placement Date: 08/12/23  -AK Placement Time: 0104  -AK Present on Hospital Admission: Y  -AK Side: Right  -AK Orientation: anterior  -AK Location: foot  -AK Primary Wound Type: Traumatic  -AK    Retired Wound - Properties Group Placement Date: 08/12/23  -AK Placement Time: 0104  -AK Present on Hospital Admission: Y  -AK Side: Right  -AK Orientation: anterior  -AK Location: foot  -AK Primary Wound Type: Traumatic  -AK    Retired Wound - Properties Group Date first assessed: 08/12/23  -AK Time first assessed: 0104  -AK Present on Hospital Admission: Y  -AK Side: Right  -AK Location: foot  -AK Primary Wound Type: Traumatic   -AK      Row Name             Wound 08/12/23 0106 Left anterior foot Traumatic    Wound - Properties Group Placement Date: 08/12/23  -AK Placement Time: 0106  -AK Present on Hospital Admission: Y  -AK Side: Left  -AK Orientation: anterior  -AK Location: foot  -AK Primary Wound Type: Traumatic  -AK    Retired Wound - Properties Group Placement Date: 08/12/23  -AK Placement Time: 0106  -AK Present on Hospital Admission: Y  -AK Side: Left  -AK Orientation: anterior  -AK Location: foot  -AK Primary Wound Type: Traumatic  -AK    Retired Wound - Properties Group Date first assessed: 08/12/23  -AK Time first assessed: 0106  -AK Present on Hospital Admission: Y  -AK Side: Left  -AK Location: foot  -AK Primary Wound Type: Traumatic  -AK      Row Name             Wound 08/12/23 0113 Bilateral posterior heel Pressure Injury    Wound - Properties Group Placement Date: 08/12/23  -AK Placement Time: 0113  -AK Present on Hospital Admission: Y  -AK Side: Bilateral  -AK Orientation: posterior  -AK Location: heel  -AK Primary Wound Type: Pressure inj  -AK    Retired Wound - Properties Group Placement Date: 08/12/23  -AK Placement Time: 0113  -AK Present on Hospital Admission: Y  -AK Side: Bilateral  -AK Orientation: posterior  -AK Location: heel  -AK Primary Wound Type: Pressure inj  -AK    Retired Wound - Properties Group Date first assessed: 08/12/23  -AK Time first assessed: 0113  -AK Present on Hospital Admission: Y  -AK Side: Bilateral  -AK Location: heel  -AK Primary Wound Type: Pressure inj  -AK      Row Name 08/18/23 1300          Progress Summary (PT)    Progress Toward Functional Goals (PT) progress toward functional goals is good  -REBECCA               User Key  (r) = Recorded By, (t) = Taken By, (c) = Cosigned By      Initials Name Provider Type    Syed Barragan PT Physical Therapist    Conchita Orlando, RN Registered Nurse                    Physical Therapy Education       Title: PT OT SLP Therapies (Done)        Topic: Physical Therapy (Done)       Point: Mobility training (Done)       Learning Progress Summary             Patient Acceptance, E,TB, VU by REBECCA at 8/16/2023 1524                                         User Key       Initials Effective Dates Name Provider Type Discipline    REBECCA 06/03/21 -  Syed Tejeda, PT Physical Therapist PT                  PT Recommendation and Plan  Anticipated Discharge Disposition (PT): sub acute care setting  Planned Therapy Interventions (PT): balance training, bed mobility training, gait training, home exercise program, motor coordination training, neuromuscular re-education, ROM (range of motion), stair training, strengthening, stretching, transfer training  Therapy Frequency (PT): daily  Progress Summary (PT)  Progress Toward Functional Goals (PT): progress toward functional goals is good  Plan of Care Reviewed With: patient  Outcome Evaluation: Patient presents with decreased LE strength and is unable to safely transfer and ambulate independently. Skilled PT is required to address these deficits.   Outcome Measures       Row Name 08/16/23 1500             How much help from another person do you currently need...    Turning from your back to your side while in flat bed without using bedrails? 2  -REBECCA      Moving from lying on back to sitting on the side of a flat bed without bedrails? 2  -REBECCA      Moving to and from a bed to a chair (including a wheelchair)? 2  -REBECCA      Standing up from a chair using your arms (e.g., wheelchair, bedside chair)? 2  -REBECCA      Climbing 3-5 steps with a railing? 1  -REBECCA      To walk in hospital room? 2  -REBECCA      AM-PAC 6 Clicks Score (PT) 11  -REBECCA                User Key  (r) = Recorded By, (t) = Taken By, (c) = Cosigned By      Initials Name Provider Type    Syed Barragan PT Physical Therapist                     Time Calculation:    PT Charges       Row Name 08/18/23 0419             Time Calculation    PT Received On 08/18/23  -REBECCA         Timed  Charges    32330 - Gait Training Minutes  10  -REBECCA         Total Minutes    Timed Charges Total Minutes 10  -REBECCA       Total Minutes 10  -REBECCA                User Key  (r) = Recorded By, (t) = Taken By, (c) = Cosigned By      Initials Name Provider Type    Syed Barragan, PT Physical Therapist                      PT G-Codes  Outcome Measure Options: AM-PAC 6 Clicks Daily Activity (OT), Optimal Instrument  AM-PAC 6 Clicks Score (PT): 11  AM-PAC 6 Clicks Score (OT): 15    Syed Tejeda, PT  8/18/2023

## 2023-08-19 LAB
ALBUMIN SERPL-MCNC: 2.8 G/DL (ref 3.5–5.2)
ANION GAP SERPL CALCULATED.3IONS-SCNC: 8.9 MMOL/L (ref 5–15)
BUN SERPL-MCNC: 17 MG/DL (ref 8–23)
BUN/CREAT SERPL: 11.9 (ref 7–25)
CALCIUM SPEC-SCNC: 8.2 MG/DL (ref 8.2–9.6)
CHLORIDE SERPL-SCNC: 106 MMOL/L (ref 98–107)
CO2 SERPL-SCNC: 23.1 MMOL/L (ref 22–29)
CREAT SERPL-MCNC: 1.43 MG/DL (ref 0.57–1)
DEPRECATED RDW RBC AUTO: 64.3 FL (ref 37–54)
EGFRCR SERPLBLD CKD-EPI 2021: 34.3 ML/MIN/1.73
ERYTHROCYTE [DISTWIDTH] IN BLOOD BY AUTOMATED COUNT: 19.1 % (ref 12.3–15.4)
GLUCOSE BLDC GLUCOMTR-MCNC: 92 MG/DL (ref 70–99)
GLUCOSE BLDC GLUCOMTR-MCNC: 98 MG/DL (ref 70–99)
GLUCOSE BLDC GLUCOMTR-MCNC: 98 MG/DL (ref 70–99)
GLUCOSE SERPL-MCNC: 90 MG/DL (ref 65–99)
HCT VFR BLD AUTO: 31.5 % (ref 34–46.6)
HGB BLD-MCNC: 10.3 G/DL (ref 12–15.9)
MAGNESIUM SERPL-MCNC: 1.9 MG/DL (ref 1.7–2.3)
MCH RBC QN AUTO: 31.3 PG (ref 26.6–33)
MCHC RBC AUTO-ENTMCNC: 32.7 G/DL (ref 31.5–35.7)
MCV RBC AUTO: 95.7 FL (ref 79–97)
PHOSPHATE SERPL-MCNC: 2.9 MG/DL (ref 2.5–4.5)
PLATELET # BLD AUTO: 241 10*3/MM3 (ref 140–450)
PMV BLD AUTO: 8.8 FL (ref 6–12)
POTASSIUM SERPL-SCNC: 3.6 MMOL/L (ref 3.5–5.2)
RBC # BLD AUTO: 3.29 10*6/MM3 (ref 3.77–5.28)
SODIUM SERPL-SCNC: 138 MMOL/L (ref 136–145)
WBC NRBC COR # BLD: 5.21 10*3/MM3 (ref 3.4–10.8)

## 2023-08-19 PROCEDURE — 80069 RENAL FUNCTION PANEL: CPT | Performed by: PHYSICIAN ASSISTANT

## 2023-08-19 PROCEDURE — 99232 SBSQ HOSP IP/OBS MODERATE 35: CPT | Performed by: INTERNAL MEDICINE

## 2023-08-19 PROCEDURE — 85027 COMPLETE CBC AUTOMATED: CPT | Performed by: PHYSICIAN ASSISTANT

## 2023-08-19 PROCEDURE — 83735 ASSAY OF MAGNESIUM: CPT | Performed by: PHYSICIAN ASSISTANT

## 2023-08-19 PROCEDURE — 82948 REAGENT STRIP/BLOOD GLUCOSE: CPT

## 2023-08-19 RX ADMIN — MICONAZOLE NITRATE: 2 POWDER TOPICAL at 22:00

## 2023-08-19 RX ADMIN — WHITE PETROLATUM 1 APPLICATION: 1.75 OINTMENT TOPICAL at 22:02

## 2023-08-19 RX ADMIN — WHITE PETROLATUM 1 APPLICATION: 1.75 OINTMENT TOPICAL at 09:05

## 2023-08-19 RX ADMIN — MICONAZOLE NITRATE: 2 POWDER TOPICAL at 09:05

## 2023-08-19 RX ADMIN — SODIUM BICARBONATE 650 MG TABLET 650 MG: at 22:00

## 2023-08-19 RX ADMIN — SODIUM BICARBONATE 650 MG TABLET 650 MG: at 17:26

## 2023-08-19 RX ADMIN — LEVOTHYROXINE SODIUM 100 MCG: 0.1 TABLET ORAL at 06:09

## 2023-08-19 RX ADMIN — FAMOTIDINE 10 MG: 10 TABLET ORAL at 09:06

## 2023-08-19 RX ADMIN — ACETAMINOPHEN 650 MG: 325 TABLET ORAL at 09:06

## 2023-08-19 RX ADMIN — SODIUM BICARBONATE 650 MG TABLET 650 MG: at 09:06

## 2023-08-19 RX ADMIN — SENNOSIDES AND DOCUSATE SODIUM 2 TABLET: 50; 8.6 TABLET ORAL at 22:00

## 2023-08-19 NOTE — PROGRESS NOTES
[facility]   Hospitalist Progress Note  Date: 2023  Patient Name: Jayashree Longoria  : 1929  MRN: [id number]  Date of admission: 2023      Subjective   Subjective     Chief Complaint: Fall and found down     Summary:   Patient 89-year-old female past medical history significant for type 2 diabetes hypertension hyper hyperlipidemia hypothyroidism lives on her own is checked on regularly by her friend she presents to the ED after being found on the floor in the emergency department patient was evaluated noted to be dehydrated with HANNAH had CT scan of the head with contrast which showed no acute intracranial abnormality CT scan abdomen pelvis showed 7.6 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  Patient mated to a monitored bed given IV fluids with improvement of her renal function additionally given large aortic aneurysm vascular surgery consulted given the patient's advanced age and poor quality of life it was not felt that surgical intervention was indicated.  Patient has been seen by physical therapy and Occupational Therapy she is very weak will likely need rehab.  Additionally given her advanced age and poor quality of life palliative care has been consulted patient is a DNR hospice referral for information made.  Currently patient is improved/stable mental status improved awaiting patient's nephew/POA to arrive from out of town to make further decisions.       Interval Followup: Patient seen and examined resting comfortably sitting up in bed eating breakfast appears to be in no acute distress stable overall waiting on rehab placement transfer to regular bed no need for cardiac monitoring at this point    Review of Systems  All systems reviewed negative except the following: Patient complains of generalized weakness  fatigue    Objective   Objective     Vitals:   Temp:  [97.5 øF (36.4 øC)-98.2 øF (36.8 øC)] 97.8 øF (36.6 øC)  Heart Rate:  [79-90] 79  Resp:  [16-18] 18  BP: (111-129)/(54-98) 124/82  Physical Exam    Constitutional:awake, alert answering questions   Eyes: Pupils equal, sclerae anicteric, no conjunctival injection   HENT: NCAT, mucous membranes moist   Neck: Supple, no thyromegaly, no lymphadenopathy, trachea midline   Respiratory: Clear to auscultation bilaterally, nonlabored respirations    Cardiovascular: RRR, no murmurs, rubs, or gallops, palpable pedal pulses bilaterally   Gastrointestinal: Positive bowel sounds, soft, nontender, nondistended   Musculoskeletal: No bilateral ankle edema, no clubbing or cyanosis to extremities   Psychiatric: Appropriate affect, cooperative   Neurologic: Awake alert oriented no acute distress no focal deficits   skin: No rashes     Result Review    Result Review:  I have reviewed the following:  [x]  Laboratory  CBC          8/17/2023    04:47 8/18/2023    04:06 8/19/2023    04:01   CBC   WBC 6.91  5.79  5.21    RBC 3.59  3.31  3.29    Hemoglobin 11.0  10.2  10.3    Hematocrit 37.0  34.1  31.5    .1  103.0  95.7    MCH 30.6  30.8  31.3    MCHC 29.7  29.9  32.7    RDW 19.7  19.0  19.1    Platelets 200  212  241      BMP          8/17/2023    04:47 8/18/2023    04:06 8/19/2023    04:01   BMP   BUN 20  18  17    Creatinine 1.50  1.53  1.43    Sodium 132  138  138    Potassium 4.2  4.0  3.6    Chloride 109  108  106    CO2 14.2  20.0  23.1    Calcium 8.2  8.1  8.2    [x]  Microbiology  No results found for: ACANTHNAEG, AFBCX, BPERTUSSISCX, BLOODCX  No results found for: BCIDPCR, CXREFLEX, CSFCX, CULTURETIS  No results found for: CULTURES, HSVCX, URCX  No results found for: EYECULTURE, GCCX, HSVCULTURE, LABHSV  No results found for: LEGIONELLA, MRSACX, MUMPSCX, MYCOPLASCX  No results found for: NOCARDIACX, STOOLCX  No results found for: THROATCX, UNSTIMCULT, URINECX, CULTURE,  VZVCULTUR  No results found for: VIRALCULTU, WOUNDCX    [x]  Radiology     CT Abdomen Pelvis Without Contrast    Result Date: 8/11/2023  PROCEDURE: CT ABDOMEN PELVIS WO CONTRAST  COMPARISON: 8/11/2023.  INDICATIONS: Aneurysm on x-ray with back pain; generalized lower back pain.  TECHNIQUE: 728 CT images were created without intravenous contrast.   PROTOCOL:   Standard CT imaging protocol performed.    RADIATION:   Total DLP: 599.5 mGy*cm.   Automated exposure control was utilized to minimize radiation dose.  FINDINGS: There is a large (7.6 cm) infrarenal abdominal aortic aneurysm, probably fusiform in configuration.  The aneurysm neck is roughly estimated at 3 cm, as on image 106 of series 202.  The fusiform aneurysm extends is about 9.4 cm caudally to just above the aortic bifurcation.  No acute retroperitoneal or intraperitoneal hemorrhage is seen to suggest aneurysm leakage or rupture.  There may be mild fusiform dilatation of the bilateral common iliac arteries.  The left common iliac artery measures about 1.6 cm in greatest diameter.  The right common iliac artery measures about 1.5 cm in greatest diameter.  The proximal left internal iliac artery measures about 1.4 cm in greatest diameter.  The proximal right internal iliac artery measures about 1.1 cm in greatest diameter.  Extensive atherosclerotic changes are seen.  For instance, extensive arterial calcification involves the proximal bilateral renal arteries.  Minimal calcified plaque is seen at the origins of the celiac trunk and superior mesenteric artery.  The inferior mesenteric artery is not well evaluated by this study; it appears to arise from the lower left anterior margin of the large fusiform aneurysm.   Age-indeterminate vertebral compression fractures are seen at T11 and L1, estimated at 30 percent or less in degree.  Vacuum phenomenon involves the disc interspaces at T11-12, T12-L1, L1-2, L2-3, L3-4, and L4-5.  Severe degenerative changes are  seen throughout the imaged spine.  There is suspected levoscoliosis of the lumbar spine.  Chronic malalignment (at several levels) is seen throughout the lumbar spine.   Additionally, colonic diverticulosis is seen without acute diverticulitis.  No acute appendicitis.  No mechanical bowel obstruction.  No pneumoperitoneum or pneumatosis.  The patient has undergone cholecystectomy.  No acute pancreatitis.  Small to moderate sized hiatal hernia is seen.  There are coronary artery calcifications.  No cardiac enlargement.  A trace amount of pericardial effusion is seen.  There may be tiny bilateral pleural effusions.  Motion artifact obscures detail.  Mild prominence of the adrenal glands is seen, which is nonspecific.  No definite hydronephrosis, nephrolithiasis, or ureterolithiasis.  There are arterial calcifications in close proximity to the distal ureters.  It appears that the patient has undergone hysterectomy.  Degenerative changes involve the bilateral hip joints and the bilateral sacroiliac joints.  There is mild distension of the urinary bladder.      Impression:    1. There is a 7.6 cm fusiform aneurysm of the infrarenal abdominal aorta, as discussed.  No acute retroperitoneal hemorrhage is seen to suggest aneurysm leakage or rupture.   2. Age-indeterminate (but possibly subacute) vertebral compression fractures are seen at T11 and L1, estimated at 30 percent or less in degree.  There is minimal if any posterior wall retropulsion associated with these findings.   3. There are small bilateral pleural effusions.   4. The patient has undergone hysterectomy.   5. Probably no significant acute findings are seen otherwise.   6. Please see above comments for further detail.     A preliminary report (regarding the abdominal aortic aneurysm, AAA) was given to Dr. Moya (ordering/attending [facility] ED Physician) at approximately 2205 hours on 8/11/2023.   Please note that portions of this note were completed with a voice  recognition program.  VALERIA FRIEDMAN JR, MD       Electronically Signed and Approved By: VALERIA FRIEDMAN JR, MD on 8/11/2023 at 22:18                 []  EKG/Telemetry   []  Cardiology/Vascular   []  Pathology  []  Old records  []  Other:    Assessment & Plan   Assessment / Plan     Assessment:  DM Type 2  Hypothyroid--uncontrolled  Suspect Dementia  HLD  Fall at home   7.6 aneurysm of the infrarenal abdominal aorta  vertebral compression fractures are seen at T11 and L1  Hypothermia 96.1    Plan:  Discontinue cardiac monitor  Transfer to regular bed  Vascular surgery consulted for aneurysm. Patient declines surgery at thistime and she is not a good operative candidate due to advanced age/quality of life  Continue with sodium bicarbonate pills  Monitor chemistries for renal function electrolytes  Monitor CBC  Continue with miconazole for groin rash  Continue  increased dose of her Synthroid as her thyroid studies are abnormal  Continue sliding scale insulin for diabetes  Palliative consulted  [facility] 8/16/2023 long-term prognosis poor/guarded would be appropriate for hospice level of care.  Discussed with RN discussed with patient patient's friend at bedside    DVT prophylaxis:  No DVT prophylaxis order currently exists.    CODE STATUS:   Medical Intervention Limits: NO intubation (DNI)  Level Of Support Discussed With: Patient  Code Status (Patient has no pulse and is not breathing): No CPR (Do Not Attempt to Resuscitate)  Medical Interventions (Patient has pulse or is breathing): Limited Support  Electronically signed by DIEGO Coreas, 08/19/23, 12:32 PM EDT.            Attending Documentation:  Patient independently seen and evaluated, above documentation reflects plan put forth during bedside rounds.  More than 51% of the time of this patient encounter was performed by me. I discussed the care plan with JULIANO Ely PA-C, I agree with his findings and plan as documented, what I have added to the care  plan and modified is as follows in my documentation and my medical decision making; 89-year-old female with a host of medical problems presented with low back pain, found to have aneurysm, declined vascular surgery evaluation/intervention at this time.  She has been stable, consult placed for hospice referral.  They prefer to go to nursing facility.  She is doing well and stable to move to regular bed.  Electronically signed by Maikol Giles MD, 08/19/23, 2:01 PM EDT.

## 2023-08-19 NOTE — PLAN OF CARE
Goal Outcome Evaluation:    Transfer from PCU this shift. Patient is AAOx1, [place], VSS. No report of pain, SOA or N/V/D. Remains on SSI with insulin given if needed as ordered. Appetite is fair and encouraging PO fluid intake. Nephew will be in town on Monday to discuss DC plan. Potential DC to rehab. Continue to monitor with current POC.

## 2023-08-19 NOTE — NURSING NOTE
Patient has transfer orders; being transferred to [id number]. Called report to LAMAR Ngo. Nephew (HCS), Romain, was called and I left a voicemail for him to let him know about the transfer.     Juan Villela RN

## 2023-08-20 LAB
ANION GAP SERPL CALCULATED.3IONS-SCNC: 9.1 MMOL/L (ref 5–15)
BUN SERPL-MCNC: 16 MG/DL (ref 8–23)
BUN/CREAT SERPL: 11.7 (ref 7–25)
CALCIUM SPEC-SCNC: 8 MG/DL (ref 8.2–9.6)
CHLORIDE SERPL-SCNC: 109 MMOL/L (ref 98–107)
CO2 SERPL-SCNC: 19.9 MMOL/L (ref 22–29)
CREAT SERPL-MCNC: 1.37 MG/DL (ref 0.57–1)
EGFRCR SERPLBLD CKD-EPI 2021: 36.1 ML/MIN/1.73
GLUCOSE BLDC GLUCOMTR-MCNC: 123 MG/DL (ref 70–99)
GLUCOSE BLDC GLUCOMTR-MCNC: 77 MG/DL (ref 70–99)
GLUCOSE BLDC GLUCOMTR-MCNC: 81 MG/DL (ref 70–99)
GLUCOSE SERPL-MCNC: 82 MG/DL (ref 65–99)
POTASSIUM SERPL-SCNC: 3.9 MMOL/L (ref 3.5–5.2)
SODIUM SERPL-SCNC: 138 MMOL/L (ref 136–145)

## 2023-08-20 PROCEDURE — 82948 REAGENT STRIP/BLOOD GLUCOSE: CPT

## 2023-08-20 PROCEDURE — 97110 THERAPEUTIC EXERCISES: CPT

## 2023-08-20 PROCEDURE — 97530 THERAPEUTIC ACTIVITIES: CPT

## 2023-08-20 PROCEDURE — 99232 SBSQ HOSP IP/OBS MODERATE 35: CPT | Performed by: INTERNAL MEDICINE

## 2023-08-20 PROCEDURE — 80048 BASIC METABOLIC PNL TOTAL CA: CPT | Performed by: PHYSICIAN ASSISTANT

## 2023-08-20 RX ADMIN — SODIUM BICARBONATE 650 MG TABLET 650 MG: at 21:01

## 2023-08-20 RX ADMIN — LEVOTHYROXINE SODIUM 100 MCG: 0.1 TABLET ORAL at 06:46

## 2023-08-20 RX ADMIN — WHITE PETROLATUM 1 APPLICATION: 1.75 OINTMENT TOPICAL at 21:02

## 2023-08-20 RX ADMIN — SODIUM BICARBONATE 650 MG TABLET 650 MG: at 17:09

## 2023-08-20 RX ADMIN — MICONAZOLE NITRATE: 2 POWDER TOPICAL at 08:54

## 2023-08-20 RX ADMIN — MICONAZOLE NITRATE: 2 POWDER TOPICAL at 21:02

## 2023-08-20 RX ADMIN — Medication 10 MG: at 21:16

## 2023-08-20 RX ADMIN — ACETAMINOPHEN 650 MG: 325 TABLET ORAL at 21:16

## 2023-08-20 RX ADMIN — FAMOTIDINE 10 MG: 10 TABLET ORAL at 08:53

## 2023-08-20 RX ADMIN — SODIUM BICARBONATE 650 MG TABLET 650 MG: at 08:52

## 2023-08-20 RX ADMIN — WHITE PETROLATUM 1 APPLICATION: 1.75 OINTMENT TOPICAL at 10:19

## 2023-08-20 RX ADMIN — SENNOSIDES AND DOCUSATE SODIUM 2 TABLET: 50; 8.6 TABLET ORAL at 08:53

## 2023-08-20 RX ADMIN — Medication 1 APPLICATION: at 21:10

## 2023-08-20 NOTE — THERAPY TREATMENT NOTE
Acute Care - Physical Therapy Treatment Note   [facility]     Patient Name: Jayashree Longoria  : 1929  MRN: [id number]  Today's Date: 2023      Visit Dx:     ICD-10-CM ICD-9-CM   1. Fall, initial encounter  W19.XXXA E888.9   2. Dehydration  E86.0 276.51   3. HANNAH (acute kidney injury)  N17.9 584.9   4. Abdominal aortic aneurysm (AAA) without rupture, unspecified part  I71.40 441.4   5. Decreased activities of daily living (ADL)  Z78.9 V49.89   6. Difficulty in walking  R26.2 719.7     Patient Active Problem List   Diagnosis    HANNAH (acute kidney injury)    Hypothermia     Past Medical History:   Diagnosis Date    Diabetes mellitus     Disease of thyroid gland     Hyperlipidemia     Hypertension     Renal disorder      History reviewed. No pertinent surgical history.  PT Assessment (last 12 hours)       PT Evaluation and Treatment       Row Name 23 1039          Physical Therapy Time and Intention    Subjective Information complains of;weakness;fatigue;pain  -DK     Document Type therapy note (daily note)  -DK     Mode of Treatment individual therapy;physical therapy  -DK     Patient Effort good  -DK     Symptoms Noted During/After Treatment fatigue;increased pain  -DK     Comment Pt is very [place], states she is not able to walk, c/o minor pain in bilateral hips with abduction, but mainly c/o buttock pain.  -DK       Row Name 23 1039          Pain    Pretreatment Pain Rating 5/10  -DK     Posttreatment Pain Rating 7/10  -DK     Pain Location generalized  -DK     Pain Location - buttock;hip  -DK     Pain Intervention(s) Repositioned;Ambulation/increased activity;Distraction;Therapeutic presence  -DK       Row Name 23 1039          Cognition    Affect/Mental Status (Cognition) confused;anxious  -DK     Behavioral Issues (Cognition) overwhelmed easily  -DK     Orientation Status (Cognition) oriented to;person;situation  -DK     Follows Commands (Cognition) physical/tactile prompts  required;repetition of directions required;verbal cues/prompting required  -DK     Cognitive Function attention deficit  -DK     Attention Deficit (Cognition) minimal deficit;concentration;focused/sustained attention  -DK     Personal Safety Interventions gait belt;nonskid shoes/slippers when out of bed;supervised activity  -       Row Name 08/20/23 1039          Bed Mobility    Bed Mobility supine-sit-supine;scooting/bridging  -DK     Scooting/Bridging [place] (Bed Mobility) maximum assist (25% patient effort);1 person assist  -DK     Supine-Sit [place] (Bed Mobility) moderate assist (50% patient effort);maximum assist (25% patient effort);1 person assist  -DK     Sit-Supine [place] (Bed Mobility) moderate assist (50% patient effort);maximum assist (25% patient effort);1 person assist  -DK     Supine-Sit-Supine [place] (Bed Mobility) moderate assist (50% patient effort);maximum assist (25% patient effort);1 person assist  -DK     Bed Mobility, Safety Issues decreased use of arms for pushing/pulling;decreased use of legs for bridging/pushing  -DK     Assistive Device (Bed Mobility) bed rails;draw sheet  -       Row Name 08/20/23 1039          Transfers    Transfers sit-stand transfer;stand-sit transfer  -       Row Name 08/20/23 1039          Sit-Stand Transfer    Sit-Stand [place] (Transfers) minimum assist (75% patient effort);moderate assist (50% patient effort);1 person assist  -     Assistive Device (Sit-Stand Transfers) walker, front-wheeled  -       Row Name 08/20/23 1039          Stand-Sit Transfer    Stand-Sit [place] (Transfers) minimum assist (75% patient effort);moderate assist (50% patient effort);1 person assist  -     Assistive Device (Stand-Sit Transfers) walker, front-wheeled  -       Row Name 08/20/23 1039          Gait/Stairs (Locomotion)    Gait/Stairs Locomotion gait/ambulation independence;gait/ambulation assistive device;distance ambulated;gait  pattern  -DK     Sims Level (Gait) minimum assist (75% patient effort);1 person assist  -DK     Assistive Device (Gait) walker, front-wheeled  -DK     Distance in Feet (Gait) 6  3' forward / reverse  -DK     Pattern (Gait) step-to  -DK     Deviations/Abnormal Patterns (Gait) festinating/shuffling;gait speed decreased;lena decreased;stride length decreased  -DK     Bilateral Gait Deviations forward flexed posture  -DK     Comment, (Gait/Stairs) Pt stood and ambulated 3' forward / reverse with a rolling walker, on room air.  She c/o bilateral foot pain/numbness. Pt was returned to bed on alert post treatment.  -DK       Row Name 08/20/23 1039          Safety Issues, Functional Mobility    Safety Issues Affecting Function (Mobility) ability to follow commands;awareness of need for assistance;impulsivity;judgment;safety precaution awareness  -DK     Impairments Affecting Function (Mobility) balance;cognition;endurance/activity tolerance;pain;strength  Hard of hearing  -DK     Cognitive Impairments, Mobility Safety/Performance attention;awareness, need for assistance;impulsivity;judgment;safety precaution awareness  -DK       Row Name 08/20/23 1039          Balance    Balance Assessment sitting static balance;sitting dynamic balance;standing static balance;standing dynamic balance  -DK     Static Sitting Balance standby assist;contact guard;1-person assist  -DK     Dynamic Sitting Balance standby assist;contact guard;1-person assist  -DK     Position, Sitting Balance unsupported;sitting edge of bed  -DK     Static Standing Balance minimal assist;moderate assist;1-person assist  -DK     Dynamic Standing Balance minimal assist;moderate assist;1-person assist  -DK     Position/Device Used, Standing Balance walker, front-wheeled  -DK     Balance Interventions standing;dynamic;supported;tandem gait  -DK       Row Name 08/20/23 1039          Motor Skills    Motor Skills --  therapeutic exercises  -DK      Coordination WFL  -     Therapeutic Exercise hip;knee;ankle  -     Additional Documentation --  Pt c/o minor pain in bilateral hips with abduction movements.  -       Row Name 08/20/23 1039          Hip (Therapeutic Exercise)    Hip (Therapeutic Exercise) AAROM (active assistive range of motion)  -     Hip AAROM (Therapeutic Exercise) bilateral;flexion;extension;aBduction;aDduction;supine;10 repetitions;2 sets  -       Row Name 08/20/23 1039          Knee (Therapeutic Exercise)    Knee (Therapeutic Exercise) AAROM (active assistive range of motion)  -     Knee AAROM (Therapeutic Exercise) bilateral;flexion;extension;supine;10 repetitions;2 sets  -       Row Name 08/20/23 1039          Ankle (Therapeutic Exercise)    Ankle (Therapeutic Exercise) AAROM (active assistive range of motion)  -     Ankle AAROM (Therapeutic Exercise) bilateral;dorsiflexion;plantarflexion;supine;10 repetitions;2 sets  -       Row Name             Wound 08/12/23 0053 Bilateral anterior abdomen MASD (Moisture associated skin damage)    Wound - Properties Group Placement Date: 08/12/23  -AK Placement Time: 0053  -AK Present on Hospital Admission: Y  -AK Side: Bilateral  -AK Orientation: anterior  -AK Location: abdomen  -AK Primary Wound Type: MASD  -AK    Retired Wound - Properties Group Placement Date: 08/12/23  -AK Placement Time: 0053  -AK Present on Hospital Admission: Y  -AK Side: Bilateral  -AK Orientation: anterior  -AK Location: abdomen  -AK Primary Wound Type: MASD  -AK    Retired Wound - Properties Group Date first assessed: 08/12/23  -AK Time first assessed: 0053  -AK Present on Hospital Admission: Y  -AK Side: Bilateral  -AK Location: abdomen  -AK Primary Wound Type: MASD  -AK      Row Name             Wound 08/12/23 0054 Left posterior plantar    Wound - Properties Group Placement Date: 08/12/23  -AK Placement Time: 0054  -AK Side: Left  -AK Orientation: posterior  -AK Location: plantar  -AK    Retired Wound -  Properties Group Placement Date: 08/12/23  -AK Placement Time: 0054  -AK Side: Left  -AK Orientation: posterior  -AK Location: plantar  -AK    Retired Wound - Properties Group Date first assessed: 08/12/23  -AK Time first assessed: 0054  -AK Side: Left  -AK Location: plantar  -AK      Row Name             Wound 08/12/23 0055 Bilateral anterior fourth toe Abrasion    Wound - Properties Group Placement Date: 08/12/23  -AK Placement Time: 0055  -AK Present on Hospital Admission: Y  -AK Side: Bilateral  -AK Orientation: anterior  -AK Location: fourth toe  -AK Primary Wound Type: Abrasion  -AK    Retired Wound - Properties Group Placement Date: 08/12/23  -AK Placement Time: 0055  -AK Present on Hospital Admission: Y  -AK Side: Bilateral  -AK Orientation: anterior  -AK Location: fourth toe  -AK Primary Wound Type: Abrasion  -AK    Retired Wound - Properties Group Date first assessed: 08/12/23  -AK Time first assessed: 0055  -AK Present on Hospital Admission: Y  -AK Side: Bilateral  -AK Location: fourth toe  -AK Primary Wound Type: Abrasion  -AK      Row Name             Wound 08/12/23 0056 Bilateral posterior gluteal    Wound - Properties Group Placement Date: 08/12/23  -AK Placement Time: 0056  -AK Present on Hospital Admission: Y  -AK Side: Bilateral  -AK Orientation: posterior  -AK Location: gluteal  -AK    Retired Wound - Properties Group Placement Date: 08/12/23  -AK Placement Time: 0056  -AK Present on Hospital Admission: Y  -AK Side: Bilateral  -AK Orientation: posterior  -AK Location: gluteal  -AK    Retired Wound - Properties Group Date first assessed: 08/12/23  -AK Time first assessed: 0056  -AK Present on Hospital Admission: Y  -AK Side: Bilateral  -AK Location: gluteal  -AK      Row Name             Wound 08/12/23 0104 Right anterior foot Traumatic    Wound - Properties Group Placement Date: 08/12/23  -AK Placement Time: 0104  -AK Present on Hospital Admission: Y  -AK Side: Right  -AK Orientation: anterior   -AK Location: foot  -AK Primary Wound Type: Traumatic  -AK    Retired Wound - Properties Group Placement Date: 08/12/23  -AK Placement Time: 0104  -AK Present on Hospital Admission: Y  -AK Side: Right  -AK Orientation: anterior  -AK Location: foot  -AK Primary Wound Type: Traumatic  -AK    Retired Wound - Properties Group Date first assessed: 08/12/23  -AK Time first assessed: 0104  -AK Present on Hospital Admission: Y  -AK Side: Right  -AK Location: foot  -AK Primary Wound Type: Traumatic  -AK      Row Name             Wound 08/12/23 0106 Left anterior foot Traumatic    Wound - Properties Group Placement Date: 08/12/23  -AK Placement Time: 0106  -AK Present on Hospital Admission: Y  -AK Side: Left  -AK Orientation: anterior  -AK Location: foot  -AK Primary Wound Type: Traumatic  -AK    Retired Wound - Properties Group Placement Date: 08/12/23  -AK Placement Time: 0106  -AK Present on Hospital Admission: Y  -AK Side: Left  -AK Orientation: anterior  -AK Location: foot  -AK Primary Wound Type: Traumatic  -AK    Retired Wound - Properties Group Date first assessed: 08/12/23  -AK Time first assessed: 0106  -AK Present on Hospital Admission: Y  -AK Side: Left  -AK Location: foot  -AK Primary Wound Type: Traumatic  -AK      Row Name             Wound 08/12/23 0113 Bilateral posterior heel Pressure Injury    Wound - Properties Group Placement Date: 08/12/23  -AK Placement Time: 0113  -AK Present on Hospital Admission: Y  -AK Side: Bilateral  -AK Orientation: posterior  -AK Location: heel  -AK Primary Wound Type: Pressure inj  -AK    Retired Wound - Properties Group Placement Date: 08/12/23  -AK Placement Time: 0113  -AK Present on Hospital Admission: Y  -AK Side: Bilateral  -AK Orientation: posterior  -AK Location: heel  -AK Primary Wound Type: Pressure inj  -AK    Retired Wound - Properties Group Date first assessed: 08/12/23  -AK Time first assessed: 0113  -AK Present on Hospital Admission: Y  -AK Side: Bilateral  -AK  Location: heel  -AK Primary Wound Type: Pressure inj  -AK      Row Name 08/20/23 1039          Plan of Care Review    Plan of Care Reviewed With patient  -DK     Progress improving  -DK       Row Name 08/20/23 1039          Positioning and Restraints    Pre-Treatment Position in bed  -DK     Post Treatment Position bed  -DK     In Bed supine;call light within reach;encouraged to call for assist;exit alarm on;side rails up x2;legs elevated;heels elevated  -DK       Row Name 08/20/23 1039          Therapy Assessment/Plan (PT)    Rehab Potential (PT) fair, will monitor progress closely  -DK     Criteria for Skilled Interventions Met (PT) skilled treatment is necessary  -DK     Therapy Frequency (PT) daily  -DK     Problem List (PT) problems related to;balance;mobility;strength;range of motion (ROM);pain;hearing  -DK     Activity Limitations Related to Problem List (PT) unable to ambulate safely;unable to transfer safely  -DK       Row Name 08/20/23 1039          Progress Summary (PT)    Progress Toward Functional Goals (PT) progress toward functional goals is fair  -DK               User Key  (r) = Recorded By, (t) = Taken By, (c) = Cosigned By      Initials Name Provider Type    Pastora Guerrero, PTA Physical Therapist Assistant    Conchita Orlando, RN Registered Nurse                    Physical Therapy Education       Title: PT OT SLP Therapies (Done)       Topic: Physical Therapy (Done)       Point: Mobility training (Done)       Learning Progress Summary             Patient Acceptance, E,TB, VU by REBECCA at 8/16/2023 1524                                         User Key       Initials Effective Dates Name Provider Type Discipline    REBECCA 06/03/21 -  Syed Tejeda, PT Physical Therapist PT                  PT Recommendation and Plan  Planned Therapy Interventions (PT): balance training, bed mobility training, gait training, strengthening, transfer training  Therapy Frequency (PT): daily  Progress Summary  (PT)  Progress Toward Functional Goals (PT): progress toward functional goals is fair  Plan of Care Reviewed With: patient  Progress: improving   Outcome Measures       Row Name 08/20/23 1038             How much help from another person do you currently need...    Turning from your back to your side while in flat bed without using bedrails? 2  -DK      Moving from lying on back to sitting on the side of a flat bed without bedrails? 2  -DK      Moving to and from a bed to a chair (including a wheelchair)? 2  -DK      Standing up from a chair using your arms (e.g., wheelchair, bedside chair)? 2  -DK      Climbing 3-5 steps with a railing? 1  -DK      To walk in hospital room? 2  -DK      AM-PAC 6 Clicks Score (PT) 11  -DK         Functional Assessment    Outcome Measure Options AM-PAC 6 Clicks Basic Mobility (PT)  -DK                User Key  (r) = Recorded By, (t) = Taken By, (c) = Cosigned By      Initials Name Provider Type    Pastora Guerrero PTA Physical Therapist Assistant                     Time Calculation:    PT Charges       Row Name 08/20/23 1045             Time Calculation    PT Received On 08/20/23  -DK      PT Goal Re-Cert Due Date 08/25/23  -DK         Timed Charges    05309 - PT Therapeutic Exercise Minutes 14  -DK      38208 - Gait Training Minutes  5  -DK      98361 - PT Therapeutic Activity Minutes 7  -DK         Total Minutes    Timed Charges Total Minutes 26  -DK       Total Minutes 26  -DK                User Key  (r) = Recorded By, (t) = Taken By, (c) = Cosigned By      Initials Name Provider Type    Pastora Guerrero PTA Physical Therapist Assistant                  Therapy Charges for Today       Code Description Service Date Service Provider Modifiers Qty    15117265440 HC PT THER PROC EA 15 MIN 8/20/2023 Pastora Jefferson PTA GP 1    10299193527 HC PT THERAPEUTIC ACT EA 15 MIN 8/20/2023 Pastora Jefferson PTA GP 1            PT G-Codes  Outcome Measure Options: AM-PAC 6 Clicks Basic  Mobility (PT)  AM-PAC 6 Clicks Score (PT): 11  AM-PAC 6 Clicks Score (OT): 15    Pastora Jefferson, PTA  8/20/2023

## 2023-08-20 NOTE — PROGRESS NOTES
[facility]   Hospitalist Progress Note  Date: 2023  Patient Name: Jayashree Longoria  : 1929  MRN: [id number]  Date of admission: 2023      Subjective   Subjective     Chief Complaint: Fall and found down     Summary:   Patient 89-year-old female past medical history significant for type 2 diabetes hypertension hyper hyperlipidemia hypothyroidism lives on her own is checked on regularly by her friend she presents to the ED after being found on the floor in the emergency department patient was evaluated noted to be dehydrated with HANNAH had CT scan of the head with contrast which showed no acute intracranial abnormality CT scan abdomen pelvis showed 7.6 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  Patient mated to a monitored bed given IV fluids with improvement of her renal function additionally given large aortic aneurysm vascular surgery consulted given the patient's advanced age and poor quality of life it was not felt that surgical intervention was indicated.  Patient has been seen by physical therapy and Occupational Therapy she is very weak will likely need rehab.  Additionally given her advanced age and poor quality of life palliative care has been consulted patient is a DNR hospice referral for information made.  Currently patient is improved/stable mental status improved awaiting patient's nephew/POA to arrive from out of town to make further decisions.       Interval Followup: doing well, no complaints and in good spirits    Review of Systems  All systems reviewed negative except the following: Patient complains of generalized weakness fatigue    Objective   Objective     Vitals:   Temp:  [97.7 øF (36.5 øC)-98.2 øF (36.8 øC)] 98 øF (36.7 øC)  Heart Rate:  [79-88] 81  Resp:  [16-18] 16  BP: (104-140)/(54-94) 129/73  Physical  Exam    Constitutional:awake, alert answering questions and in a pleasant mood   Eyes: Pupils equal, sclerae anicteric, no conjunctival injection   HENT: NCAT, mucous membranes moist   Neck: Supple, no thyromegaly, no lymphadenopathy, trachea midline   Respiratory: Clear to auscultation bilaterally, nonlabored respirations    Cardiovascular: RRR, no murmurs, rubs, or gallops, palpable pedal pulses bilaterally   Gastrointestinal: Positive bowel sounds, soft, nontender, nondistended   Musculoskeletal: No bilateral ankle edema, no clubbing or cyanosis to extremities   Psychiatric: Appropriate affect, cooperative   Neurologic: Awake alert oriented no acute distress no focal deficits   skin: No rashes     Result Review    Result Review:  I have reviewed the following:  [x]  Laboratory  CBC          8/17/2023    04:47 8/18/2023    04:06 8/19/2023    04:01   CBC   WBC 6.91  5.79  5.21    RBC 3.59  3.31  3.29    Hemoglobin 11.0  10.2  10.3    Hematocrit 37.0  34.1  31.5    .1  103.0  95.7    MCH 30.6  30.8  31.3    MCHC 29.7  29.9  32.7    RDW 19.7  19.0  19.1    Platelets 200  212  241      BMP          8/17/2023    04:47 8/18/2023    04:06 8/19/2023    04:01   BMP   BUN 20  18  17    Creatinine 1.50  1.53  1.43    Sodium 132  138  138    Potassium 4.2  4.0  3.6    Chloride 109  108  106    CO2 14.2  20.0  23.1    Calcium 8.2  8.1  8.2    []  Microbiology      []  Radiology      []  EKG/Telemetry   []  Cardiology/Vascular   []  Pathology  []  Old records  []  Other:    Assessment & Plan   Assessment / Plan     Assessment:  DM Type 2  Hypothyroid--uncontrolled  Suspect Dementia  HLD  Fall at home   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 Patient declines surgery at thistime and she is not a good operative candidate due to  advanced age/quality of life  Continue with sodium bicarbonate pills  Monitor chemistries for renal function electrolytes  Monitor CBC  Continue with miconazole for groin rash  Continue  increased dose of her Synthroid as her thyroid studies are abnormal  Continue sliding scale insulin for diabetes  Palliative consulted  [facility] 8/16/2023 long-term prognosis poor/guarded would be appropriate for hospice level of care but not currently interested  Await rehab decision     DVT prophylaxis:  No DVT prophylaxis order currently exists.    CODE STATUS:   Medical Intervention Limits: NO intubation (DNI)  Level Of Support Discussed With: Patient  Code Status (Patient has no pulse and is not breathing): No CPR (Do Not Attempt to Resuscitate)  Medical Interventions (Patient has pulse or is breathing): Limited Support    Electronically signed by Maikol Giles MD, 08/20/23, 7:05 PM EDT.

## 2023-08-20 NOTE — PLAN OF CARE
Goal Outcome Evaluation: pleasant and cooperative. Turned and repositioned . Family in to see patient.

## 2023-08-21 ENCOUNTER — APPOINTMENT (OUTPATIENT)
Dept: CARDIOLOGY | Facility: HOSPITAL | Age: 88
DRG: 683 | End: 2023-08-21
Payer: MEDICARE

## 2023-08-21 LAB
ALBUMIN SERPL-MCNC: 2.7 G/DL (ref 3.5–5.2)
ANION GAP SERPL CALCULATED.3IONS-SCNC: 7.4 MMOL/L (ref 5–15)
BASOPHILS # BLD AUTO: 0.02 10*3/MM3 (ref 0–0.2)
BASOPHILS NFR BLD AUTO: 0.4 % (ref 0–1.5)
BH CV LOWER VASCULAR LEFT COMMON FEMORAL AUGMENT: NORMAL
BH CV LOWER VASCULAR LEFT COMMON FEMORAL COMPETENT: NORMAL
BH CV LOWER VASCULAR LEFT COMMON FEMORAL COMPRESS: NORMAL
BH CV LOWER VASCULAR LEFT COMMON FEMORAL PHASIC: NORMAL
BH CV LOWER VASCULAR LEFT COMMON FEMORAL SPONT: NORMAL
BH CV LOWER VASCULAR LEFT DISTAL FEMORAL COMPRESS: NORMAL
BH CV LOWER VASCULAR LEFT GASTRONEMIUS COMPRESS: NORMAL
BH CV LOWER VASCULAR LEFT GREATER SAPH AK AUGMENT: NORMAL
BH CV LOWER VASCULAR LEFT GREATER SAPH AK COMPETENT: NORMAL
BH CV LOWER VASCULAR LEFT GREATER SAPH AK COMPRESS: NORMAL
BH CV LOWER VASCULAR LEFT GREATER SAPH AK PHASIC: NORMAL
BH CV LOWER VASCULAR LEFT GREATER SAPH AK SPONT: NORMAL
BH CV LOWER VASCULAR LEFT GREATER SAPH BK COMPRESS: NORMAL
BH CV LOWER VASCULAR LEFT LESSER SAPH COMPRESS: NORMAL
BH CV LOWER VASCULAR LEFT MID FEMORAL AUGMENT: NORMAL
BH CV LOWER VASCULAR LEFT MID FEMORAL COMPETENT: NORMAL
BH CV LOWER VASCULAR LEFT MID FEMORAL COMPRESS: NORMAL
BH CV LOWER VASCULAR LEFT MID FEMORAL PHASIC: NORMAL
BH CV LOWER VASCULAR LEFT MID FEMORAL SPONT: NORMAL
BH CV LOWER VASCULAR LEFT PERONEAL AUGMENT: NORMAL
BH CV LOWER VASCULAR LEFT PERONEAL COMPETENT: NORMAL
BH CV LOWER VASCULAR LEFT PERONEAL COMPRESS: NORMAL
BH CV LOWER VASCULAR LEFT PERONEAL PHASIC: NORMAL
BH CV LOWER VASCULAR LEFT PERONEAL SPONT: NORMAL
BH CV LOWER VASCULAR LEFT POPLITEAL AUGMENT: NORMAL
BH CV LOWER VASCULAR LEFT POPLITEAL COMPETENT: NORMAL
BH CV LOWER VASCULAR LEFT POPLITEAL COMPRESS: NORMAL
BH CV LOWER VASCULAR LEFT POPLITEAL PHASIC: NORMAL
BH CV LOWER VASCULAR LEFT POPLITEAL SPONT: NORMAL
BH CV LOWER VASCULAR LEFT POSTERIOR TIBIAL AUGMENT: NORMAL
BH CV LOWER VASCULAR LEFT POSTERIOR TIBIAL COMPETENT: NORMAL
BH CV LOWER VASCULAR LEFT POSTERIOR TIBIAL COMPRESS: NORMAL
BH CV LOWER VASCULAR LEFT POSTERIOR TIBIAL PHASIC: NORMAL
BH CV LOWER VASCULAR LEFT POSTERIOR TIBIAL SPONT: NORMAL
BH CV LOWER VASCULAR LEFT PROXIMAL FEMORAL COMPRESS: NORMAL
BH CV LOWER VASCULAR RIGHT COMMON FEMORAL AUGMENT: NORMAL
BH CV LOWER VASCULAR RIGHT COMMON FEMORAL COMPETENT: NORMAL
BH CV LOWER VASCULAR RIGHT COMMON FEMORAL COMPRESS: NORMAL
BH CV LOWER VASCULAR RIGHT COMMON FEMORAL PHASIC: NORMAL
BH CV LOWER VASCULAR RIGHT COMMON FEMORAL SPONT: NORMAL
BUN SERPL-MCNC: 15 MG/DL (ref 8–23)
BUN/CREAT SERPL: 10.2 (ref 7–25)
CALCIUM SPEC-SCNC: 7.9 MG/DL (ref 8.2–9.6)
CHLORIDE SERPL-SCNC: 104 MMOL/L (ref 98–107)
CO2 SERPL-SCNC: 24.6 MMOL/L (ref 22–29)
CREAT SERPL-MCNC: 1.47 MG/DL (ref 0.57–1)
DEPRECATED RDW RBC AUTO: 65.4 FL (ref 37–54)
EGFRCR SERPLBLD CKD-EPI 2021: 33.2 ML/MIN/1.73
EOSINOPHIL # BLD AUTO: 0.06 10*3/MM3 (ref 0–0.4)
EOSINOPHIL NFR BLD AUTO: 1.3 % (ref 0.3–6.2)
ERYTHROCYTE [DISTWIDTH] IN BLOOD BY AUTOMATED COUNT: 18.9 % (ref 12.3–15.4)
GLUCOSE BLDC GLUCOMTR-MCNC: 130 MG/DL (ref 70–99)
GLUCOSE BLDC GLUCOMTR-MCNC: 144 MG/DL (ref 70–99)
GLUCOSE BLDC GLUCOMTR-MCNC: 83 MG/DL (ref 70–99)
GLUCOSE BLDC GLUCOMTR-MCNC: 83 MG/DL (ref 70–99)
GLUCOSE SERPL-MCNC: 82 MG/DL (ref 65–99)
HCT VFR BLD AUTO: 33 % (ref 34–46.6)
HGB BLD-MCNC: 10.2 G/DL (ref 12–15.9)
IMM GRANULOCYTES # BLD AUTO: 0.01 10*3/MM3 (ref 0–0.05)
IMM GRANULOCYTES NFR BLD AUTO: 0.2 % (ref 0–0.5)
LYMPHOCYTES # BLD AUTO: 1.97 10*3/MM3 (ref 0.7–3.1)
LYMPHOCYTES NFR BLD AUTO: 42.3 % (ref 19.6–45.3)
MAGNESIUM SERPL-MCNC: 1.9 MG/DL (ref 1.7–2.3)
MCH RBC QN AUTO: 30 PG (ref 26.6–33)
MCHC RBC AUTO-ENTMCNC: 30.9 G/DL (ref 31.5–35.7)
MCV RBC AUTO: 97.1 FL (ref 79–97)
MONOCYTES # BLD AUTO: 0.71 10*3/MM3 (ref 0.1–0.9)
MONOCYTES NFR BLD AUTO: 15.2 % (ref 5–12)
NEUTROPHILS NFR BLD AUTO: 1.89 10*3/MM3 (ref 1.7–7)
NEUTROPHILS NFR BLD AUTO: 40.6 % (ref 42.7–76)
NRBC BLD AUTO-RTO: 0 /100 WBC (ref 0–0.2)
PHOSPHATE SERPL-MCNC: 3.2 MG/DL (ref 2.5–4.5)
PLATELET # BLD AUTO: 251 10*3/MM3 (ref 140–450)
PMV BLD AUTO: 9 FL (ref 6–12)
POTASSIUM SERPL-SCNC: 3.9 MMOL/L (ref 3.5–5.2)
RBC # BLD AUTO: 3.4 10*6/MM3 (ref 3.77–5.28)
SODIUM SERPL-SCNC: 136 MMOL/L (ref 136–145)
WBC NRBC COR # BLD: 4.66 10*3/MM3 (ref 3.4–10.8)

## 2023-08-21 PROCEDURE — 80069 RENAL FUNCTION PANEL: CPT | Performed by: PHYSICIAN ASSISTANT

## 2023-08-21 PROCEDURE — 93971 EXTREMITY STUDY: CPT | Performed by: SURGERY

## 2023-08-21 PROCEDURE — 25010000002 HEPARIN (PORCINE) PER 1000 UNITS: Performed by: INTERNAL MEDICINE

## 2023-08-21 PROCEDURE — 99232 SBSQ HOSP IP/OBS MODERATE 35: CPT | Performed by: INTERNAL MEDICINE

## 2023-08-21 PROCEDURE — 93971 EXTREMITY STUDY: CPT

## 2023-08-21 PROCEDURE — 83735 ASSAY OF MAGNESIUM: CPT | Performed by: PHYSICIAN ASSISTANT

## 2023-08-21 PROCEDURE — 85025 COMPLETE CBC W/AUTO DIFF WBC: CPT | Performed by: INTERNAL MEDICINE

## 2023-08-21 PROCEDURE — 97110 THERAPEUTIC EXERCISES: CPT

## 2023-08-21 PROCEDURE — 82948 REAGENT STRIP/BLOOD GLUCOSE: CPT

## 2023-08-21 RX ORDER — HEPARIN SODIUM 5000 [USP'U]/ML
5000 INJECTION, SOLUTION INTRAVENOUS; SUBCUTANEOUS EVERY 8 HOURS SCHEDULED
Status: DISCONTINUED | OUTPATIENT
Start: 2023-08-21 | End: 2023-08-24 | Stop reason: HOSPADM

## 2023-08-21 RX ADMIN — FAMOTIDINE 10 MG: 10 TABLET ORAL at 08:13

## 2023-08-21 RX ADMIN — MICONAZOLE NITRATE: 2 POWDER TOPICAL at 08:13

## 2023-08-21 RX ADMIN — WHITE PETROLATUM 1 APPLICATION: 1.75 OINTMENT TOPICAL at 21:10

## 2023-08-21 RX ADMIN — SODIUM BICARBONATE 650 MG TABLET 650 MG: at 08:12

## 2023-08-21 RX ADMIN — ACETAMINOPHEN 650 MG: 325 TABLET ORAL at 11:17

## 2023-08-21 RX ADMIN — SODIUM BICARBONATE 650 MG TABLET 650 MG: at 15:06

## 2023-08-21 RX ADMIN — HEPARIN SODIUM 5000 UNITS: 5000 INJECTION INTRAVENOUS; SUBCUTANEOUS at 21:10

## 2023-08-21 RX ADMIN — MICONAZOLE NITRATE: 2 POWDER TOPICAL at 21:10

## 2023-08-21 RX ADMIN — HEPARIN SODIUM 5000 UNITS: 5000 INJECTION INTRAVENOUS; SUBCUTANEOUS at 15:06

## 2023-08-21 RX ADMIN — SENNOSIDES AND DOCUSATE SODIUM 2 TABLET: 50; 8.6 TABLET ORAL at 21:10

## 2023-08-21 RX ADMIN — LEVOTHYROXINE SODIUM 100 MCG: 0.1 TABLET ORAL at 05:46

## 2023-08-21 RX ADMIN — SODIUM BICARBONATE 650 MG TABLET 650 MG: at 21:09

## 2023-08-21 RX ADMIN — WHITE PETROLATUM 1 APPLICATION: 1.75 OINTMENT TOPICAL at 11:18

## 2023-08-21 NOTE — PROGRESS NOTES
" [facility]   Hospitalist Progress Note  Date: 2023  Patient Name: Jayashree Longoria  : 1929  MRN: [id number]  Date of admission: 2023      Subjective   Subjective     Chief Complaint: Fall and found down     Summary:   Very pleasant 89-year-old female with diabetes, hypertension and hyperlipidemia.  She lives on her own but is checked on regularly by a friend.  She came to the ED after being found down.  She was hydrated and had HANNAH.  CT of the head showed nothing acute.  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  She was seen by vascular surgery and given her advanced age did not feel to be a candidate for intervention.  She wished to be DNR.  Palliative care was consulted.  Hospice was consulted.  At this point however patient and her family prefer to go to nursing facility.  She is stable for discharge, awaiting family to get to town to look at rehabs in the morning to precertification.    Interval Followup: \"I feel good for an old woman\"    Review of Systems  All systems reviewed negative except the following: Patient complains of generalized weakness fatigue    Objective   Objective     Vitals:   Temp:  [97.6 øF (36.4 øC)-98.3 øF (36.8 øC)] 97.9 øF (36.6 øC)  Heart Rate:  [80-92] 81  Resp:  [16-20] 16  BP: (108-134)/(48-77) 134/54  Physical Exam    Constitutional:awake, alert answering questions and in a pleasant mood   Eyes: Pupils equal, sclerae anicteric, no conjunctival injection   HENT: NCAT, mucous membranes moist   Neck: Supple, no thyromegaly, no lymphadenopathy, trachea midline   Respiratory: Clear to auscultation bilaterally, nonlabored respirations    Cardiovascular: RRR, no murmurs, rubs, or gallops, palpable pedal pulses bilaterally   Gastrointestinal: Positive bowel sounds, soft, nontender, nondistended   Musculoskeletal: No bilateral ankle edema, no clubbing or cyanosis to extremities   Psychiatric: " Appropriate affect, cooperative   Neurologic: Awake alert oriented no acute distress no focal deficits   skin: No rashes     Result Review    Result Review:  I have reviewed the following:  [x]  Laboratory  CBC          8/18/2023    04:06 8/19/2023    04:01 8/21/2023    06:08   CBC   WBC 5.79  5.21  4.66    RBC 3.31  3.29  3.40    Hemoglobin 10.2  10.3  10.2    Hematocrit 34.1  31.5  33.0    .0  95.7  97.1    MCH 30.8  31.3  30.0    MCHC 29.9  32.7  30.9    RDW 19.0  19.1  18.9    Platelets 212  241  251      BMP          8/19/2023    04:01 8/20/2023    08:10 8/21/2023    06:08   BMP   BUN 17  16  15    Creatinine 1.43  1.37  1.47    Sodium 138  138  136    Potassium 3.6  3.9  3.9    Chloride 106  109  104    CO2 23.1  19.9  24.6    Calcium 8.2  8.0  7.9    []  Microbiology      []  Radiology      []  EKG/Telemetry   []  Cardiology/Vascular   []  Pathology  []  Old records  []  Other:    Assessment & Plan   Assessment / Plan     Assessment:  DM Type 2  Hypothyroid--uncontrolled  Suspect Dementia  HLD  Fall at home   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 Patient declines surgery at thistime and she is not a good operative candidate due to advanced age/quality of life  Continue with sodium bicarbonate pills  Monitor chemistries for renal function electrolytes  Monitor CBC  Continue with miconazole for groin rash  Continue  increased dose of her Synthroid as her thyroid studies are abnormal  Continue sliding scale insulin for diabetes  Palliative consulted  [facility] 8/16/2023 long-term prognosis poor/guarded would be appropriate for hospice level of care but not currently interested  Await rehab decision     DVT prophylaxis:  No DVT prophylaxis order currently exists.    CODE STATUS:   Medical Intervention  Limits: NO intubation (DNI)  Level Of Support Discussed With: Patient  Code Status (Patient has no pulse and is not breathing): No CPR (Do Not Attempt to Resuscitate)  Medical Interventions (Patient has pulse or is breathing): Limited Support    Electronically signed by Maikol Giles MD, 08/21/23, 10:13 AM EDT.

## 2023-08-21 NOTE — PLAN OF CARE
Goal Outcome Evaluation:      Pt is [place]. Appears alert to self. Complained of left lower leg pain- given prn tylenol and pt states pain has went away. Doppler ordered

## 2023-08-21 NOTE — NURSING NOTE
Palliative care nurse was contacted by patient's nephew Romain reporting that he was in town and visiting with the patient.      Met with Romain to discuss referrals to inpatient rehab and that [facility] was able to offer a bed.  Romain wanted to tour the facility before he accepted the bed.  Patient has many friends in [facility] and may want a referral to [facility].  Romain planned to tour that facility as well and discuss both places with the patient.  He is planning on visiting all week.     Emotional support provided.  Explained the discharge process to Romain.  He will follow up with [facility] after he has toured the facilities with his wishes.      Natalya RAMÍREZ, RN, CHPN

## 2023-08-21 NOTE — THERAPY TREATMENT NOTE
Patient Name: Jayashree Longoria  : 1929    MRN: [id number]                              Today's Date: 2023       Admit Date: 2023    Visit Dx:     ICD-10-CM ICD-9-CM   1. Fall, initial encounter  W19.XXXA E888.9   2. Dehydration  E86.0 276.51   3. HANNAH (acute kidney injury)  N17.9 584.9   4. Abdominal aortic aneurysm (AAA) without rupture, unspecified part  I71.40 441.4   5. Decreased activities of daily living (ADL)  Z78.9 V49.89   6. Difficulty in walking  R26.2 719.7     Patient Active Problem List   Diagnosis    HANNAH (acute kidney injury)    Hypothermia     Past Medical History:   Diagnosis Date    Diabetes mellitus     Disease of thyroid gland     Hyperlipidemia     Hypertension     Renal disorder      History reviewed. No pertinent surgical history.   General Information       Row Name 23 0925          OT Time and Intention    Document Type therapy note (daily note)  -AV     Mode of Treatment individual therapy;occupational therapy  -AV       Row Name 23 0925          General Information    Existing Precautions/Restrictions fall  -AV     Barriers to Rehab none identified  -AV       Row Name 2325          Safety Issues, Functional Mobility    Impairments Affecting Function (Mobility) balance;endurance/activity tolerance;cognition;strength  -AV               User Key  (r) = Recorded By, (t) = Taken By, (c) = Cosigned By      Initials Name Provider Type    AV Carlos Recio OT Occupational Therapist                     Mobility/ADL's    No documentation.                  Obj/Interventions       Row Name 23 0925          Shoulder (Therapeutic Exercise)    Shoulder (Therapeutic Exercise) AROM (active range of motion)  -AV     Shoulder AROM (Therapeutic Exercise) bilateral;flexion;horizontal aBduction/aDduction;10 repetitions  -AV       Row Name 23 0925          Elbow/Forearm (Therapeutic Exercise)    Elbow/Forearm (Therapeutic Exercise) AROM (active range of  motion)  -AV     Elbow/Forearm AROM (Therapeutic Exercise) bilateral;flexion;extension;supination;pronation;10 repetitions  -AV       Row Name 08/21/23 0925          Hand (Therapeutic Exercise)    Hand (Therapeutic Exercise) AROM (active range of motion)  -AV     Hand AROM/AAROM (Therapeutic Exercise) bilateral;finger flexion;finger extension;10 repetitions  -AV       Row Name 08/21/23 0925          Motor Skills    Therapeutic Exercise shoulder;elbow/forearm;hand  performed in high-Delong's/ on room air. moderate cues/ demonstration required for exercise completion.  -AV               User Key  (r) = Recorded By, (t) = Taken By, (c) = Cosigned By      Initials Name Provider Type    Carlos Barragan OT Occupational Therapist                   Goals/Plan    No documentation.                  Clinical Impression       Row Name 08/21/23 0928          Pain Scale: FACES Pre/Post-Treatment    Pain: FACES Scale, Pretreatment 0-->no hurt  -AV     Posttreatment Pain Rating 0-->no hurt  -AV       Row Name 08/21/23 0928          Plan of Care Review    Progress no change  -AV       Row Name 08/21/23 0928          Vital Signs    O2 Delivery Pre Treatment room air  -AV     O2 Delivery Intra Treatment room air  -AV     O2 Delivery Post Treatment room air  -AV               User Key  (r) = Recorded By, (t) = Taken By, (c) = Cosigned By      Initials Name Provider Type    Carlos Barragan OT Occupational Therapist                   Outcome Measures       Row Name 08/21/23 0929          How much help from another is currently needed...    Putting on and taking off regular lower body clothing? 2  -AV     Bathing (including washing, rinsing, and drying) 2  -AV     Toileting (which includes using toilet bed pan or urinal) 1  -AV     Putting on and taking off regular upper body clothing 3  -AV     Taking care of personal grooming (such as brushing teeth) 3  -AV     Eating meals 4  -AV     AM-PAC 6 Clicks Score (OT) 15  -AV       Row  Name 08/21/23 0929          Optimal Instrument    Bending/Stooping 4  -AV     Standing 2  -AV     Reaching 1  -AV               User Key  (r) = Recorded By, (t) = Taken By, (c) = Cosigned By      Initials Name Provider Type    AV Carlos Recio OT Occupational Therapist                    Occupational Therapy Education       Title: PT OT SLP Therapies (Done)       Topic: Occupational Therapy (Done)       Point: ADL training (Done)       Description:   Instruct learner(s) on proper safety adaptation and remediation techniques during self care or transfers.   Instruct in proper use of assistive devices.                  Learning Progress Summary             Patient Acceptance, E,TB, VU by REBECCA at 8/16/2023 1524    Acceptance, E,TB, VU by  at 8/16/2023 1430                         Point: Precautions (Done)       Description:   Instruct learner(s) on prescribed precautions during self-care and functional transfers.                  Learning Progress Summary             Patient Acceptance, E,TB, VU by REBECCA at 8/16/2023 1524    Acceptance, E,TB, VU by LF at 8/16/2023 1430                         Point: Body mechanics (Done)       Description:   Instruct learner(s) on proper positioning and spine alignment during self-care, functional mobility activities and/or exercises.                  Learning Progress Summary             Patient Acceptance, E,TB, VU by REBECCA at 8/16/2023 1524    Acceptance, E,TB, VU by  at 8/16/2023 1430                                         User Key       Initials Effective Dates Name Provider Type Discipline    LF 06/16/21 -  Leanne Bazzi OT Occupational Therapist OT    REBECCA 06/03/21 -  Syed Tejeda PT Physical Therapist PT                  OT Recommendation and Plan     Plan of Care Review  Progress: no change     Time Calculation:         Time Calculation- OT       Row Name 08/21/23 0929             Time Calculation- OT    OT Received On 08/21/23  -AV      OT Goal Re-Cert Due Date 08/25/23   -AV         Timed Charges    80150 - OT Therapeutic Exercise Minutes 10  -AV         Total Minutes    Timed Charges Total Minutes 10  -AV       Total Minutes 10  -AV                User Key  (r) = Recorded By, (t) = Taken By, (c) = Cosigned By      Initials Name Provider Type    Carlos Barragan OT Occupational Therapist                  Therapy Charges for Today       Code Description Service Date Service Provider Modifiers Qty    [id number] HC OT THER PROC EA 15 MIN 8/21/2023 Carlos Recio OT GO 1                 Carlos Recio OT  8/21/2023

## 2023-08-21 NOTE — CONSULTS
"Nutrition Services    Patient Name: Jayashree Longoria  YOB: 1929  MRN: [id number]  Admission date: 8/11/2023      CLINICAL NUTRITION ASSESSMENT      Reason for Assessment  Identified at risk by screening criteria, MST score 2+, LOS, Nonhealing wound or pressure ulcer     H&P:    Past Medical History:   Diagnosis Date    Diabetes mellitus     Disease of thyroid gland     Hyperlipidemia     Hypertension     Renal disorder         Current Problems:   Active Hospital Problems    Diagnosis     **HANNAH (acute kidney injury)     Hypothermia         Nutrition/Diet History         Narrative     89 year old female admitted after a fall at home.  Pt dx with an HANNAH and upon admission had multiple pressure injuries noted to bilateral feet and gluteal area.  PMH described above.    Tez Score = 13    Labs - pt with elevated creatinine consistent with HANNAH.    Last BM 8/18/23, no problems with N/V/D.    MST score = 2 related to report that she is unsure if she has had recent wt loss.     Pt feeds self 75% of regular diet with regular texture and thin liquids after tray set up.  Pt shows no problems chewing or swallowing.      Unable to assess wt history due to lack of available information.    BMI 27.73 is a healthy weight indicator for age  Pt is 132% of IBW range.      Nutrition Acuity Risk Score = 7 -Low risk for decline.    Wound care to assess PI to bottom of foot and areas noted to both feet, heels/toes as well as gluteal area.      RD recommends ONS to increase protein for wound healing, specifically amino acids, arginine and glutamine.  Marques with each meal.       Anthropometrics        Current Height, Weight Height: 165.1 cm (65\")  Weight: 75.6 kg (166 lb 10.7 oz)   Current BMI Body mass index is 27.73 kg/mý.       Weight Hx  Wt Readings from Last 30 Encounters:   08/12/23 0120 75.6 kg (166 lb 10.7 oz)   08/12/23 0054 75.6 kg (166 lb 10.7 oz)   08/11/23 1707 75.6 kg (166 lb 10.7 oz)            Wt Change " Observation AIDEN     Estimated/Assessed Needs       Energy Requirements Adjusted body wt = 142#/64 kg   EST Needs (kcal/day) 25 kcal/kg    1600 kcals   Protein Requirements    EST Daily Needs (g/day) 1.0 - 1.2 g/kg    64-75 grams   Fluid Requirements     Estimated Needs (mL/day) 30 ml/kg                1920 ml (8 cups)  Labs/Medications         Pertinent Labs Reviewed.   Results from last 7 days   Lab Units 08/21/23  0608 08/20/23  0810 08/19/23  0401   SODIUM mmol/L 136 138 138   POTASSIUM mmol/L 3.9 3.9 3.6   CHLORIDE mmol/L 104 109* 106   CO2 mmol/L 24.6 19.9* 23.1   BUN mg/dL 15 16 17   CREATININE mg/dL 1.47* 1.37* 1.43*   CALCIUM mg/dL 7.9* 8.0* 8.2   GLUCOSE mg/dL 82 82 90     Results from last 7 days   Lab Units 08/21/23  0608 08/19/23  0401 08/18/23  0406   MAGNESIUM mg/dL 1.9 1.9 2.0   PHOSPHORUS mg/dL 3.2 2.9 2.9   HEMOGLOBIN g/dL 10.2* 10.3* 10.2*   HEMATOCRIT % 33.0* 31.5* 34.1     No results found for: COVID19  Lab Results   Component Value Date    HGBA1C 5.8 (H) 07/17/2020         Pertinent Medications Reviewed.     Current Nutrition Orders & Evaluation of Intake       Oral Nutrition     Current PO Diet Diet: Regular/House Diet; Texture: Regular Texture (IDDSI 7); Fluid Consistency: Thin (IDDSI 0)   Supplement Orders Placed This Encounter      DIET MESSAGE Send boost with each meal tray please       Malnutrition Severity Assessment                Nutrition Diagnosis         Nutrition Dx Problem 1 Increased nutrient needs related to skin integrity as evidenced by multiple PI to feet and gluteal area.      Nutrition Intervention         Regular diet, regular texture, thin  liquids  Marques with meals   +270 calorie, 7.5 grams of protein     Medical Nutrition Therapy/Nutrition Education          Learner     Readiness Patient  Education not indicated at this time     Method     Response N/A  N/A     Monitor/Evaluation        Monitor Per protocol, Supplement intake, Weight, Skin status, POC/GOC       Nutrition  Discharge Plan         To be determined       Electronically signed by:  Marilee Recio RD  08/21/23 14:11 EDT

## 2023-08-22 LAB
ALBUMIN SERPL-MCNC: 2.5 G/DL (ref 3.5–5.2)
ANION GAP SERPL CALCULATED.3IONS-SCNC: 8.7 MMOL/L (ref 5–15)
ANISOCYTOSIS BLD QL: NORMAL
BASOPHILS # BLD AUTO: 0.04 10*3/MM3 (ref 0–0.2)
BASOPHILS NFR BLD AUTO: 0.8 % (ref 0–1.5)
BUN SERPL-MCNC: 23 MG/DL (ref 8–23)
BUN/CREAT SERPL: 15.9 (ref 7–25)
CALCIUM SPEC-SCNC: 8 MG/DL (ref 8.2–9.6)
CHLORIDE SERPL-SCNC: 104 MMOL/L (ref 98–107)
CO2 SERPL-SCNC: 21.3 MMOL/L (ref 22–29)
CREAT SERPL-MCNC: 1.45 MG/DL (ref 0.57–1)
DACRYOCYTES BLD QL SMEAR: NORMAL
DEPRECATED RDW RBC AUTO: 70.5 FL (ref 37–54)
EGFRCR SERPLBLD CKD-EPI 2021: 33.7 ML/MIN/1.73
EOSINOPHIL # BLD AUTO: 0.02 10*3/MM3 (ref 0–0.4)
EOSINOPHIL NFR BLD AUTO: 0.4 % (ref 0.3–6.2)
ERYTHROCYTE [DISTWIDTH] IN BLOOD BY AUTOMATED COUNT: 18.6 % (ref 12.3–15.4)
GLUCOSE BLDC GLUCOMTR-MCNC: 113 MG/DL (ref 70–99)
GLUCOSE BLDC GLUCOMTR-MCNC: 122 MG/DL (ref 70–99)
GLUCOSE BLDC GLUCOMTR-MCNC: 156 MG/DL (ref 70–99)
GLUCOSE BLDC GLUCOMTR-MCNC: 86 MG/DL (ref 70–99)
GLUCOSE BLDC GLUCOMTR-MCNC: 92 MG/DL (ref 70–99)
GLUCOSE SERPL-MCNC: 93 MG/DL (ref 65–99)
HCT VFR BLD AUTO: 33.9 % (ref 34–46.6)
HGB BLD-MCNC: 9.9 G/DL (ref 12–15.9)
IMM GRANULOCYTES # BLD AUTO: 0.01 10*3/MM3 (ref 0–0.05)
IMM GRANULOCYTES NFR BLD AUTO: 0.2 % (ref 0–0.5)
LARGE PLATELETS: NORMAL
LYMPHOCYTES # BLD AUTO: 2.05 10*3/MM3 (ref 0.7–3.1)
LYMPHOCYTES NFR BLD AUTO: 43.2 % (ref 19.6–45.3)
MACROCYTES BLD QL SMEAR: NORMAL
MCH RBC QN AUTO: 31.2 PG (ref 26.6–33)
MCHC RBC AUTO-ENTMCNC: 29.2 G/DL (ref 31.5–35.7)
MCV RBC AUTO: 106.9 FL (ref 79–97)
MICROCYTES BLD QL: NORMAL
MONOCYTES # BLD AUTO: 0.76 10*3/MM3 (ref 0.1–0.9)
MONOCYTES NFR BLD AUTO: 16 % (ref 5–12)
NEUTROPHILS NFR BLD AUTO: 1.86 10*3/MM3 (ref 1.7–7)
NEUTROPHILS NFR BLD AUTO: 39.4 % (ref 42.7–76)
NRBC BLD AUTO-RTO: 0 /100 WBC (ref 0–0.2)
OVALOCYTES BLD QL SMEAR: NORMAL
PHOSPHATE SERPL-MCNC: 3.1 MG/DL (ref 2.5–4.5)
PLATELET # BLD AUTO: 226 10*3/MM3 (ref 140–450)
PMV BLD AUTO: 8.9 FL (ref 6–12)
POIKILOCYTOSIS BLD QL SMEAR: NORMAL
POTASSIUM SERPL-SCNC: 4.3 MMOL/L (ref 3.5–5.2)
RBC # BLD AUTO: 3.17 10*6/MM3 (ref 3.77–5.28)
SMALL PLATELETS BLD QL SMEAR: ADEQUATE
SODIUM SERPL-SCNC: 134 MMOL/L (ref 136–145)
WBC MORPH BLD: NORMAL
WBC NRBC COR # BLD: 4.74 10*3/MM3 (ref 3.4–10.8)

## 2023-08-22 PROCEDURE — 80069 RENAL FUNCTION PANEL: CPT | Performed by: INTERNAL MEDICINE

## 2023-08-22 PROCEDURE — 97110 THERAPEUTIC EXERCISES: CPT

## 2023-08-22 PROCEDURE — 85007 BL SMEAR W/DIFF WBC COUNT: CPT | Performed by: INTERNAL MEDICINE

## 2023-08-22 PROCEDURE — 82948 REAGENT STRIP/BLOOD GLUCOSE: CPT

## 2023-08-22 PROCEDURE — 25010000002 HEPARIN (PORCINE) PER 1000 UNITS: Performed by: INTERNAL MEDICINE

## 2023-08-22 PROCEDURE — 99233 SBSQ HOSP IP/OBS HIGH 50: CPT | Performed by: INTERNAL MEDICINE

## 2023-08-22 PROCEDURE — 63710000001 INSULIN LISPRO (HUMAN) PER 5 UNITS: Performed by: STUDENT IN AN ORGANIZED HEALTH CARE EDUCATION/TRAINING PROGRAM

## 2023-08-22 PROCEDURE — 85025 COMPLETE CBC W/AUTO DIFF WBC: CPT | Performed by: INTERNAL MEDICINE

## 2023-08-22 PROCEDURE — 97530 THERAPEUTIC ACTIVITIES: CPT

## 2023-08-22 RX ADMIN — SENNOSIDES AND DOCUSATE SODIUM 2 TABLET: 50; 8.6 TABLET ORAL at 09:09

## 2023-08-22 RX ADMIN — WHITE PETROLATUM 1 APPLICATION: 1.75 OINTMENT TOPICAL at 22:23

## 2023-08-22 RX ADMIN — HEPARIN SODIUM 5000 UNITS: 5000 INJECTION INTRAVENOUS; SUBCUTANEOUS at 15:04

## 2023-08-22 RX ADMIN — SODIUM BICARBONATE 650 MG TABLET 650 MG: at 17:01

## 2023-08-22 RX ADMIN — SODIUM BICARBONATE 650 MG TABLET 650 MG: at 09:06

## 2023-08-22 RX ADMIN — SODIUM BICARBONATE 650 MG TABLET 650 MG: at 22:23

## 2023-08-22 RX ADMIN — ACETAMINOPHEN 650 MG: 325 TABLET ORAL at 01:02

## 2023-08-22 RX ADMIN — Medication 10 MG: at 01:03

## 2023-08-22 RX ADMIN — HEPARIN SODIUM 5000 UNITS: 5000 INJECTION INTRAVENOUS; SUBCUTANEOUS at 07:27

## 2023-08-22 RX ADMIN — MICONAZOLE NITRATE: 2 POWDER TOPICAL at 22:23

## 2023-08-22 RX ADMIN — MICONAZOLE NITRATE: 2 POWDER TOPICAL at 09:10

## 2023-08-22 RX ADMIN — HEPARIN SODIUM 5000 UNITS: 5000 INJECTION INTRAVENOUS; SUBCUTANEOUS at 22:23

## 2023-08-22 RX ADMIN — LEVOTHYROXINE SODIUM 100 MCG: 0.1 TABLET ORAL at 07:27

## 2023-08-22 RX ADMIN — FAMOTIDINE 10 MG: 10 TABLET ORAL at 09:06

## 2023-08-22 RX ADMIN — WHITE PETROLATUM 1 APPLICATION: 1.75 OINTMENT TOPICAL at 11:35

## 2023-08-22 RX ADMIN — INSULIN LISPRO 2 UNITS: 100 INJECTION, SOLUTION INTRAVENOUS; SUBCUTANEOUS at 12:42

## 2023-08-22 NOTE — THERAPY TREATMENT NOTE
Acute Care - Physical Therapy Treatment Note   [facility]     Patient Name: Jayashree Longoria  : 1929  MRN: [id number]  Today's Date: 2023      Visit Dx:     ICD-10-CM ICD-9-CM   1. Fall, initial encounter  W19.XXXA E888.9   2. Dehydration  E86.0 276.51   3. HANNAH (acute kidney injury)  N17.9 584.9   4. Abdominal aortic aneurysm (AAA) without rupture, unspecified part  I71.40 441.4   5. Decreased activities of daily living (ADL)  Z78.9 V49.89   6. Difficulty in walking  R26.2 719.7     Patient Active Problem List   Diagnosis    HANNAH (acute kidney injury)    Hypothermia     Past Medical History:   Diagnosis Date    Diabetes mellitus     Disease of thyroid gland     Hyperlipidemia     Hypertension     Renal disorder      History reviewed. No pertinent surgical history.  PT Assessment (last 12 hours)       PT Evaluation and Treatment       Row Name 23 1027          Physical Therapy Time and Intention    Subjective Information no complaints  -DK     Document Type therapy note (daily note)  -DK     Mode of Treatment individual therapy;physical therapy  -DK     Patient Effort good  -DK     Symptoms Noted During/After Treatment none  -DK     Comment Pt is very [place], but was able to participate in exercises, transfers and a few steps this session.  -DK       Row Name 23 1027          Pain    Pretreatment Pain Rating 0/10 - no pain  -DK     Posttreatment Pain Rating 2/10  -DK     Pain Location generalized  -DK     Pain Location - back;hip;knee  -DK     Pain Intervention(s) Repositioned;Ambulation/increased activity;Distraction;Therapeutic presence  -DK       Row Name 23 1027          Cognition    Affect/Mental Status (Cognition) confused;anxious  -DK     Behavioral Issues (Cognition) overwhelmed easily  -DK     Orientation Status (Cognition) oriented to;person;situation  -DK     Follows Commands (Cognition) physical/tactile prompts required;repetition of directions required;verbal cues/prompting  required  -DK     Cognitive Function attention deficit  -DK     Attention Deficit (Cognition) minimal deficit;concentration;focused/sustained attention  -DK     Personal Safety Interventions gait belt;nonskid shoes/slippers when out of bed;supervised activity  -       Row Name 08/22/23 1027          Bed Mobility    Bed Mobility supine-sit-supine;scooting/bridging  -DK     Scooting/Bridging Cavalier (Bed Mobility) maximum assist (25% patient effort);1 person assist  -DK     Supine-Sit [place] (Bed Mobility) moderate assist (50% patient effort);maximum assist (25% patient effort);1 person assist  -DK     Sit-Supine [place] (Bed Mobility) moderate assist (50% patient effort);maximum assist (25% patient effort);1 person assist  -DK     Supine-Sit-Supine [place] (Bed Mobility) moderate assist (50% patient effort);maximum assist (25% patient effort);1 person assist  -DK     Bed Mobility, Safety Issues decreased use of arms for pushing/pulling;decreased use of legs for bridging/pushing  -DK     Assistive Device (Bed Mobility) bed rails;draw sheet  -       Row Name 08/22/23 1027          Transfers    Transfers sit-stand transfer;stand-sit transfer  -       Row Name 08/22/23 1027          Sit-Stand Transfer    Sit-Stand [place] (Transfers) minimum assist (75% patient effort);moderate assist (50% patient effort);1 person assist  -     Assistive Device (Sit-Stand Transfers) walker, front-wheeled  -       Row Name 08/22/23 1027          Stand-Sit Transfer    Stand-Sit [place] (Transfers) minimum assist (75% patient effort);moderate assist (50% patient effort);1 person assist  -     Assistive Device (Stand-Sit Transfers) walker, front-wheeled  -       Row Name 08/22/23 1027          Gait/Stairs (Locomotion)    Gait/Stairs Locomotion gait/ambulation independence;gait/ambulation assistive device;distance ambulated;gait pattern  -DK     [place] Level (Gait) minimum assist (75% patient  effort);1 person assist  -DK     Assistive Device (Gait) walker, front-wheeled  -DK     Distance in Feet (Gait) 6  3' forward / reverse  -DK     Pattern (Gait) step-to  -DK     Deviations/Abnormal Patterns (Gait) festinating/shuffling;gait speed decreased;lena decreased;stride length decreased  -DK     Bilateral Gait Deviations forward flexed posture  -DK     Comment, (Gait/Stairs) Pt stood and ambulated 3' forward / reverse  with a rolling walker, on room air.  She returned to bed on alert post treatment.  -       Row Name 08/22/23 1027          Safety Issues, Functional Mobility    Safety Issues Affecting Function (Mobility) impulsivity;judgment;safety precaution awareness  -DK     Impairments Affecting Function (Mobility) balance;cognition;endurance/activity tolerance;pain;strength  Hard of hearing  -DK     Cognitive Impairments, Mobility Safety/Performance attention;awareness, need for assistance;impulsivity;judgment;safety precaution awareness  -DK       Row Name 08/22/23 1027          Balance    Balance Assessment sitting static balance;sitting dynamic balance;standing static balance;standing dynamic balance  -DK     Static Sitting Balance standby assist  -DK     Dynamic Sitting Balance standby assist  -DK     Position, Sitting Balance unsupported;sitting edge of bed  -DK     Static Standing Balance contact guard;1-person assist  -DK     Dynamic Standing Balance contact guard;minimal assist;1-person assist  -DK     Position/Device Used, Standing Balance walker, front-wheeled  -DK     Balance Interventions standing;dynamic;tandem gait  -       Row Name 08/22/23 1027          Motor Skills    Motor Skills --  therapeutic exercises  -DK     Coordination WFL  -DK     Therapeutic Exercise hip;knee;ankle  -       Row Name 08/22/23 1027          Hip (Therapeutic Exercise)    Hip (Therapeutic Exercise) AAROM (active assistive range of motion)  -DK     Hip AAROM (Therapeutic Exercise)  bilateral;flexion;extension;aBduction;aDduction;supine;10 repetitions;2 sets  -DK       Row Name 08/22/23 1027          Knee (Therapeutic Exercise)    Knee (Therapeutic Exercise) AAROM (active assistive range of motion)  -DK     Knee AAROM (Therapeutic Exercise) bilateral;flexion;extension;supine;10 repetitions;2 sets  -DK       Row Name 08/22/23 1027          Ankle (Therapeutic Exercise)    Ankle (Therapeutic Exercise) AAROM (active assistive range of motion)  -DK     Ankle AAROM (Therapeutic Exercise) bilateral;dorsiflexion;plantarflexion;supine;10 repetitions;2 sets  -DK       Row Name             Wound 08/12/23 0053 Bilateral anterior abdomen MASD (Moisture associated skin damage)    Wound - Properties Group Placement Date: 08/12/23  -AK Placement Time: 0053  -AK Present on Hospital Admission: Y  -AK Side: Bilateral  -AK Orientation: anterior  -AK Location: abdomen  -AK Primary Wound Type: MASD  -AK    Retired Wound - Properties Group Placement Date: 08/12/23  -AK Placement Time: 0053  -AK Present on Hospital Admission: Y  -AK Side: Bilateral  -AK Orientation: anterior  -AK Location: abdomen  -AK Primary Wound Type: MASD  -AK    Retired Wound - Properties Group Date first assessed: 08/12/23  -AK Time first assessed: 0053  -AK Present on Hospital Admission: Y  -AK Side: Bilateral  -AK Location: abdomen  -AK Primary Wound Type: MASD  -AK      Row Name             Wound 08/12/23 0054 Left posterior plantar    Wound - Properties Group Placement Date: 08/12/23  -AK Placement Time: 0054  -AK Side: Left  -AK Orientation: posterior  -AK Location: plantar  -AK    Retired Wound - Properties Group Placement Date: 08/12/23  -AK Placement Time: 0054  -AK Side: Left  -AK Orientation: posterior  -AK Location: plantar  -AK    Retired Wound - Properties Group Date first assessed: 08/12/23  -AK Time first assessed: 0054  -AK Side: Left  -AK Location: plantar  -AK      Row Name             Wound 08/12/23 0055 Bilateral anterior  fourth toe Abrasion    Wound - Properties Group Placement Date: 08/12/23  -AK Placement Time: 0055  -AK Present on Hospital Admission: Y  -AK Side: Bilateral  -AK Orientation: anterior  -AK Location: fourth toe  -AK Primary Wound Type: Abrasion  -AK    Retired Wound - Properties Group Placement Date: 08/12/23  -AK Placement Time: 0055  -AK Present on Hospital Admission: Y  -AK Side: Bilateral  -AK Orientation: anterior  -AK Location: fourth toe  -AK Primary Wound Type: Abrasion  -AK    Retired Wound - Properties Group Date first assessed: 08/12/23  -AK Time first assessed: 0055  -AK Present on Hospital Admission: Y  -AK Side: Bilateral  -AK Location: fourth toe  -AK Primary Wound Type: Abrasion  -AK      Row Name             Wound 08/12/23 0056 Bilateral posterior gluteal    Wound - Properties Group Placement Date: 08/12/23  -AK Placement Time: 0056  -AK Present on Hospital Admission: Y  -AK Side: Bilateral  -AK Orientation: posterior  -AK Location: gluteal  -AK    Retired Wound - Properties Group Placement Date: 08/12/23  -AK Placement Time: 0056  -AK Present on Hospital Admission: Y  -AK Side: Bilateral  -AK Orientation: posterior  -AK Location: gluteal  -AK    Retired Wound - Properties Group Date first assessed: 08/12/23  -AK Time first assessed: 0056  -AK Present on Hospital Admission: Y  -AK Side: Bilateral  -AK Location: gluteal  -AK      Row Name             Wound 08/12/23 0104 Right anterior foot Traumatic    Wound - Properties Group Placement Date: 08/12/23  -AK Placement Time: 0104  -AK Present on Hospital Admission: Y  -AK Side: Right  -AK Orientation: anterior  -AK Location: foot  -AK Primary Wound Type: Traumatic  -AK    Retired Wound - Properties Group Placement Date: 08/12/23  -AK Placement Time: 0104  -AK Present on Hospital Admission: Y  -AK Side: Right  -AK Orientation: anterior  -AK Location: foot  -AK Primary Wound Type: Traumatic  -AK    Retired Wound - Properties Group Date first assessed:  08/12/23  -AK Time first assessed: 0104  -AK Present on Hospital Admission: Y  -AK Side: Right  -AK Location: foot  -AK Primary Wound Type: Traumatic  -AK      Row Name             Wound 08/12/23 0106 Left anterior foot Traumatic    Wound - Properties Group Placement Date: 08/12/23  -AK Placement Time: 0106  -AK Present on Hospital Admission: Y  -AK Side: Left  -AK Orientation: anterior  -AK Location: foot  -AK Primary Wound Type: Traumatic  -AK    Retired Wound - Properties Group Placement Date: 08/12/23  -AK Placement Time: 0106  -AK Present on Hospital Admission: Y  -AK Side: Left  -AK Orientation: anterior  -AK Location: foot  -AK Primary Wound Type: Traumatic  -AK    Retired Wound - Properties Group Date first assessed: 08/12/23  -AK Time first assessed: 0106  -AK Present on Hospital Admission: Y  -AK Side: Left  -AK Location: foot  -AK Primary Wound Type: Traumatic  -AK      Row Name             Wound 08/12/23 0113 Bilateral posterior heel Pressure Injury    Wound - Properties Group Placement Date: 08/12/23  -AK Placement Time: 0113  -AK Present on Hospital Admission: Y  -AK Side: Bilateral  -AK Orientation: posterior  -AK Location: heel  -AK Primary Wound Type: Pressure inj  -AK    Retired Wound - Properties Group Placement Date: 08/12/23  -AK Placement Time: 0113  -AK Present on Hospital Admission: Y  -AK Side: Bilateral  -AK Orientation: posterior  -AK Location: heel  -AK Primary Wound Type: Pressure inj  -AK    Retired Wound - Properties Group Date first assessed: 08/12/23  -AK Time first assessed: 0113  -AK Present on Hospital Admission: Y  -AK Side: Bilateral  -AK Location: heel  -AK Primary Wound Type: Pressure inj  -AK      Row Name 08/22/23 1027          Plan of Care Review    Plan of Care Reviewed With patient  -DK     Progress improving  -DK       Row Name 08/22/23 1027          Positioning and Restraints    Pre-Treatment Position in bed  -DK     Post Treatment Position bed  -DK     In Bed  supine;call light within reach;encouraged to call for assist;exit alarm on;side rails up x2;legs elevated;heels elevated  -       Row Name 08/22/23 1027          Therapy Assessment/Plan (PT)    Rehab Potential (PT) fair, will monitor progress closely  -     Criteria for Skilled Interventions Met (PT) skilled treatment is necessary  -     Therapy Frequency (PT) daily  -     Problem List (PT) problems related to;balance;mobility;strength;range of motion (ROM);pain;hearing  -     Activity Limitations Related to Problem List (PT) unable to ambulate safely;unable to transfer safely  -       Row Name 08/22/23 1027          Progress Summary (PT)    Progress Toward Functional Goals (PT) progress toward functional goals is fair  -               User Key  (r) = Recorded By, (t) = Taken By, (c) = Cosigned By      Initials Name Provider Type    DK Pastora Jefferson PTA Physical Therapist Assistant    Conchita Orlando, RN Registered Nurse                    Physical Therapy Education       Title: PT OT SLP Therapies (In Progress)       Topic: Physical Therapy (In Progress)       Point: Mobility training (In Progress)       Learning Progress Summary             Patient Acceptance, E, NR by  at 8/21/2023 0900    Comment: pt appears alert to self    Acceptance, E,TB, VU by  at 8/16/2023 1524   Family Acceptance, E, NR by  at 8/21/2023 0900    Comment: pt appears alert to self                                         User Key       Initials Effective Dates Name Provider Type Discipline     06/16/21 -  Mariana Leo, RN Registered Nurse Nurse    REBECCA 06/03/21 -  Syed Tejeda, PT Physical Therapist PT                  PT Recommendation and Plan  Planned Therapy Interventions (PT): balance training, bed mobility training, gait training, strengthening, transfer training  Therapy Frequency (PT): daily  Progress Summary (PT)  Progress Toward Functional Goals (PT): progress toward functional goals is fair  Plan of  Care Reviewed With: patient  Progress: improving   Outcome Measures       Row Name 08/22/23 1027 08/20/23 1038          How much help from another person do you currently need...    Turning from your back to your side while in flat bed without using bedrails? 2  -DK 2  -DK     Moving from lying on back to sitting on the side of a flat bed without bedrails? 2  -DK 2  -DK     Moving to and from a bed to a chair (including a wheelchair)? 2  -DK 2  -DK     Standing up from a chair using your arms (e.g., wheelchair, bedside chair)? 2  -DK 2  -DK     Climbing 3-5 steps with a railing? 1  -DK 1  -DK     To walk in hospital room? 2  -DK 2  -DK     AM-PAC 6 Clicks Score (PT) 11  -DK 11  -DK        Functional Assessment    Outcome Measure Options AM-PAC 6 Clicks Basic Mobility (PT)  -DK AM-PAC 6 Clicks Basic Mobility (PT)  -DK               User Key  (r) = Recorded By, (t) = Taken By, (c) = Cosigned By      Initials Name Provider Type    Pastora Guerrero PTA Physical Therapist Assistant                     Time Calculation:    PT Charges       Row Name 08/22/23 1033             Time Calculation    PT Received On 08/22/23  -DK      PT Goal Re-Cert Due Date 08/25/23  -DK         Timed Charges    54650 - PT Therapeutic Exercise Minutes 14  -DK      03438 - Gait Training Minutes  4  -DK      60576 - PT Therapeutic Activity Minutes 7  -DK         Total Minutes    Timed Charges Total Minutes 25  -DK       Total Minutes 25  -DK                User Key  (r) = Recorded By, (t) = Taken By, (c) = Cosigned By      Initials Name Provider Type    Pastora Guerrero PTA Physical Therapist Assistant                  Therapy Charges for Today       Code Description Service Date Service Provider Modifiers Qty    28719114645 HC PT THER PROC EA 15 MIN 8/22/2023 Pastora Jefferson PTA GP 1    43955733357 HC PT THERAPEUTIC ACT EA 15 MIN 8/22/2023 Pastora Jefferson PTA GP 1            PT G-Codes  Outcome Measure Options: AM-PAC 6 Clicks Basic Mobility  (PT)  AM-PAC 6 Clicks Score (PT): 11  AM-PAC 6 Clicks Score (OT): 15    Pastora Jefferson, PTA  8/22/2023

## 2023-08-22 NOTE — PLAN OF CARE
Goal Outcome Evaluation:      Patient Alert to self. Hard of hearing. No complaints of pain. On room air. Skin care and wound care completed as ordered. No other issues/needs at this time.

## 2023-08-22 NOTE — SIGNIFICANT NOTE
Wound Eval / Progress Noted    [facility]     Patient Name: Jayashree Longoria  : 1929  MRN: [id number]  Today's Date: 2023                 Admit Date: 2023    Visit Dx:    ICD-10-CM ICD-9-CM   1. Fall, initial encounter  W19.XXXA E888.9   2. Dehydration  E86.0 276.51   3. HANNAH (acute kidney injury)  N17.9 584.9   4. Abdominal aortic aneurysm (AAA) without rupture, unspecified part  I71.40 441.4   5. Decreased activities of daily living (ADL)  Z78.9 V49.89   6. Difficulty in walking  R26.2 719.7         HANNAH (acute kidney injury)    Hypothermia        Past Medical History:   Diagnosis Date    Diabetes mellitus     Disease of thyroid gland     Hyperlipidemia     Hypertension     Renal disorder         History reviewed. No pertinent surgical history.      Physical Assessment:  Wound 23 0053 Bilateral anterior abdomen MASD (Moisture associated skin damage) (Active)   Dressing Appearance open to air 23 1207   Closure None 23 1145   Base moist;pink 23 1207   Periwound moist;redness 23 1207   Periwound Temperature warm 23 1207   Periwound Skin Turgor soft 23 1207   Edges rolled/closed 23 1207   Drainage Amount none 23 1207   Care, Wound cleansed with;soap and water 23 1207   Dressing Care skin barrier agent applied;open to air 23 1207   Periwound Care topical treatment applied 23 1207       Wound 23 0054 Left posterior plantar (Active)   Wound Image   23 1145   Dressing Appearance open to air 23 1214   Closure None 23 1145   Base dry;scab;red 23 1214   Periwound pink 23 1214   Periwound Temperature warm 23 1214   Periwound Skin Turgor firm 23 1214   Edges callused 23 1214   Wound Length (cm) 1 cm 23 1145   Wound Width (cm) 0.4 cm 23 1145   Wound Depth (cm) 0.3 cm 23 1145   Wound Surface Area (cm^2) 0.4 cm^2 23 1145   Wound Volume (cm^3) 0.12 cm^3 23  1145   Drainage Characteristics/Odor serosanguineous 08/22/23 1145   Drainage Amount none 08/22/23 1214   Care, Wound cleansed with;soap and water 08/22/23 1214   Dressing Care open to air 08/22/23 1214   Periwound Care other (see comments) 08/22/23 1214       Wound 08/12/23 0055 Bilateral anterior fourth toe Abrasion (Active)   Dressing Appearance open to air 08/22/23 1214   Base scab;dry 08/22/23 1214   Periwound intact;pink 08/22/23 1214   Periwound Temperature warm 08/22/23 1214   Periwound Skin Turgor soft 08/22/23 1214   Edges callused 08/22/23 1214   Drainage Amount none 08/22/23 1214   Care, Wound cleansed with;soap and water 08/22/23 1214   Dressing Care open to air 08/22/23 1214   Periwound Care topical treatment applied 08/22/23 1214       Wound 08/12/23 0056 Bilateral posterior gluteal (Active)   Dressing Appearance open to air 08/22/23 1212   Closure None 08/22/23 1145   Base non-blanchable;purple;red 08/22/23 1212   Periwound pink;redness 08/22/23 1212   Periwound Temperature warm 08/22/23 1212   Periwound Skin Turgor soft 08/22/23 1212   Edges open 08/22/23 1212   Wound Length (cm) 2 cm 08/22/23 1145   Wound Width (cm) 0.4 cm 08/22/23 1145   Wound Depth (cm) 0.3 cm 08/22/23 1145   Wound Surface Area (cm^2) 0.8 cm^2 08/22/23 1145   Wound Volume (cm^3) 0.24 cm^3 08/22/23 1145   Drainage Characteristics/Odor yellow 08/22/23 1145   Drainage Amount scant 08/22/23 1212   Care, Wound cleansed with;sterile normal saline 08/22/23 1212   Dressing Care dressing applied;silver impregnated;silicone 08/22/23 1212   Periwound Care moisturizer applied 08/22/23 1212       Wound 08/12/23 0104 Right anterior foot Traumatic (Active)   Dressing Appearance open to air 08/22/23 1211   Closure None 08/22/23 1145   Base dry;pink;red 08/22/23 1211   Periwound dry;redness 08/22/23 1211   Periwound Temperature warm 08/22/23 1211   Periwound Skin Turgor firm 08/22/23 1211   Edges callused 08/22/23 1211   Drainage Amount none  08/22/23 1211   Care, Wound cleansed with;soap and water 08/22/23 1211   Dressing Care open to air 08/22/23 1145   Periwound Care other (see comments) 08/22/23 1211       Wound 08/12/23 0106 Left anterior foot Traumatic (Active)   Dressing Appearance open to air 08/22/23 1210   Closure None 08/22/23 1145   Base pink 08/22/23 1210   Periwound intact 08/22/23 1210   Periwound Temperature warm 08/22/23 1210   Periwound Skin Turgor soft 08/22/23 1145   Edges open 08/22/23 1210   Drainage Amount none 08/22/23 1210   Care, Wound cleansed with;soap and water 08/22/23 1210   Dressing Care open to air 08/22/23 1210   Periwound Care other (see comments) 08/22/23 1210       Wound 08/12/23 0113 Bilateral posterior heel Pressure Injury (Active)   Dressing Appearance open to air 08/22/23 1215   Closure None 08/22/23 1145   Base dry;pink 08/22/23 1215   Periwound intact 08/22/23 1215   Periwound Temperature warm 08/22/23 1215   Periwound Skin Turgor soft 08/22/23 1215   Edges callused 08/22/23 1215   Drainage Amount none 08/22/23 1215   Care, Wound cleansed with;soap and water 08/22/23 1215   Dressing Care open to air 08/22/23 1215   Periwound Care topical treatment applied 08/22/23 1215        Wound Check / Follow-up:  Patient seen today for a wound re-consult. Patient with tear to the gluteal crease, likely from moisture. Yellow / red wound base; tissue is blanchable at this time. Periwound is red and moist, blanchable as well. DTI has resolved since the last skin assessment. Cleansed with NS. Recommend daily application of silver impregnated hydrofiber to the wound base. Keep patient clean and free from moisture at all times.   Redness to abdominal folds, breasts, and groin with great improvement. Tissue remains slightly pink but tissue is dry and intact. Recommend to continue current treatment at this time.   Callus to the left plantar foot with visible wound base present; odor detected during cleansing. MD made aware of  findings. Cleansed with NS. Recommend to pack the wound base daily with silver impregnated hydrofiber. 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

## 2023-08-22 NOTE — PLAN OF CARE
Goal Outcome Evaluation:      Pt is alert to self. Complained of left leg pain. Tylenol was given as par MAR and gave pt relief. Blood glucose 144. Continue care plan.

## 2023-08-22 NOTE — NURSING NOTE
Patient's [facility] has requested inpatient rehab and will be touring [facility] and informing CM of his decision of which facility they desire.      Palliative care will sign off and allow CM to follow for discharge planning.    Natalya RAMÍREZ, RN, CHPN

## 2023-08-22 NOTE — PROGRESS NOTES
[facility]   Hospitalist Progress Note  Date: 2023  Patient Name: Jayashree Longoria  : 1929  MRN: [id number]  Date of admission: 2023      Subjective   Subjective     Chief Complaint: Fall and found down     Summary:   Very pleasant 89-year-old female with diabetes, hypertension and hyperlipidemia.  She lives on her own but is checked on regularly by a friend.  She came to the ED after being found down.  She was hydrated and had HANNAH.  CT of the head showed nothing acute.  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  She was seen by vascular surgery and given her advanced age did not feel to be a candidate for intervention.  She wished to be DNR.  Palliative care was consulted.  Hospice was consulted.  At this point however patient and her family prefer to go to nursing facility.  She is stable for discharge, awaiting family to get to town to look at rehabs in the morning to precertification.    Interval Followup: No acute events overnight, patient resting comfortably in bed    Objective   Objective     Vitals:   Temp:  [97.5 øF (36.4 øC)-98.2 øF (36.8 øC)] 97.5 øF (36.4 øC)  Heart Rate:  [78-90] 88  Resp:  [16] 16  BP: (110-126)/(58-69) 110/60  Physical Exam   GEN: No acute distress  HEENT: Moist mucous membranes  LUNGS: Equal chest rise bilaterally  CARDIAC: Regular rate and rhythm  NEURO: Moving all 4 extremities spontaneously  SKIN: No obvious breakdown    Result Review    Result Review:  I have reviewed the following:  [x]  Laboratory  CBC          2023    04:01 2023    06:08 2023    04:45   CBC   WBC 5.21  4.66  4.74    RBC 3.29  3.40  3.17    Hemoglobin 10.3  10.2  9.9    Hematocrit 31.5  33.0  33.9    MCV 95.7  97.1  106.9    MCH 31.3  30.0  31.2    MCHC 32.7  30.9  29.2    RDW 19.1  18.9  18.6    Platelets 241  251  226      BMP          2023    08:10 2023    06:08 2023    04:46   BMP    BUN 16  15  23    Creatinine 1.37  1.47  1.45    Sodium 138  136  134    Potassium 3.9  3.9  4.3    Chloride 109  104  104    CO2 19.9  24.6  21.3    Calcium 8.0  7.9  8.0    []  Microbiology      []  Radiology      []  EKG/Telemetry   []  Cardiology/Vascular   []  Pathology  []  Old records  []  Other:    Assessment & Plan   Assessment / Plan     Assessment:  DM Type 2  Hypothyroid--uncontrolled  Suspect Dementia  HLD  Fall at home   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 Patient declines surgery at thistime and she is not a good operative candidate due to advanced age/quality of life  Continue with sodium bicarbonate pills as tolerated  CBC, CMP reviewed  Continue with miconazole for groin rash, monitor for improvement  Continue  increased dose of her Synthroid as her thyroid studies are abnormal  Continue sliding scale insulin for diabetes  Palliative consulted  [facility] 8/16/2023 long-term prognosis poor/guarded would be appropriate for hospice level of care but not currently interested  Repeat CBC, CMP, mag and Phos in a.m.  CT scan of the head personally reviewed, negative for any acute intracranial abnormalities    DVT prophylaxis:  Medical DVT prophylaxis orders are present.    CODE STATUS:   Medical Intervention Limits: NO intubation (DNI)  Level Of Support Discussed With: Patient  Code Status (Patient has no pulse and is not breathing): No CPR (Do Not Attempt to Resuscitate)  Medical Interventions (Patient has pulse or is breathing): Limited Support      Electronically signed by Dick Muñoz MD, 08/22/23, 12:52 PM EDT.

## 2023-08-23 LAB
ALBUMIN SERPL-MCNC: 2.8 G/DL (ref 3.5–5.2)
ALBUMIN/GLOB SERPL: 1.2 G/DL
ALP SERPL-CCNC: 84 U/L (ref 39–117)
ALT SERPL W P-5'-P-CCNC: 13 U/L (ref 1–33)
ANION GAP SERPL CALCULATED.3IONS-SCNC: 9.4 MMOL/L (ref 5–15)
AST SERPL-CCNC: 11 U/L (ref 1–32)
BASOPHILS # BLD AUTO: 0.03 10*3/MM3 (ref 0–0.2)
BASOPHILS NFR BLD AUTO: 0.6 % (ref 0–1.5)
BILIRUB SERPL-MCNC: 0.3 MG/DL (ref 0–1.2)
BUN SERPL-MCNC: 37 MG/DL (ref 8–23)
BUN/CREAT SERPL: 27 (ref 7–25)
CALCIUM SPEC-SCNC: 8.7 MG/DL (ref 8.2–9.6)
CHLORIDE SERPL-SCNC: 102 MMOL/L (ref 98–107)
CO2 SERPL-SCNC: 25.6 MMOL/L (ref 22–29)
CREAT SERPL-MCNC: 1.37 MG/DL (ref 0.57–1)
DEPRECATED RDW RBC AUTO: 63.7 FL (ref 37–54)
EGFRCR SERPLBLD CKD-EPI 2021: 36.1 ML/MIN/1.73
EOSINOPHIL # BLD AUTO: 0.08 10*3/MM3 (ref 0–0.4)
EOSINOPHIL NFR BLD AUTO: 1.6 % (ref 0.3–6.2)
ERYTHROCYTE [DISTWIDTH] IN BLOOD BY AUTOMATED COUNT: 18.6 % (ref 12.3–15.4)
GLOBULIN UR ELPH-MCNC: 2.3 GM/DL
GLUCOSE BLDC GLUCOMTR-MCNC: 117 MG/DL (ref 70–99)
GLUCOSE BLDC GLUCOMTR-MCNC: 126 MG/DL (ref 70–99)
GLUCOSE BLDC GLUCOMTR-MCNC: 84 MG/DL (ref 70–99)
GLUCOSE BLDC GLUCOMTR-MCNC: 94 MG/DL (ref 70–99)
GLUCOSE SERPL-MCNC: 94 MG/DL (ref 65–99)
HCT VFR BLD AUTO: 30.6 % (ref 34–46.6)
HGB BLD-MCNC: 9.9 G/DL (ref 12–15.9)
IMM GRANULOCYTES # BLD AUTO: 0.01 10*3/MM3 (ref 0–0.05)
IMM GRANULOCYTES NFR BLD AUTO: 0.2 % (ref 0–0.5)
LYMPHOCYTES # BLD AUTO: 2.12 10*3/MM3 (ref 0.7–3.1)
LYMPHOCYTES NFR BLD AUTO: 43.2 % (ref 19.6–45.3)
MAGNESIUM SERPL-MCNC: 2.1 MG/DL (ref 1.7–2.3)
MCH RBC QN AUTO: 31.3 PG (ref 26.6–33)
MCHC RBC AUTO-ENTMCNC: 32.4 G/DL (ref 31.5–35.7)
MCV RBC AUTO: 96.8 FL (ref 79–97)
MONOCYTES # BLD AUTO: 0.83 10*3/MM3 (ref 0.1–0.9)
MONOCYTES NFR BLD AUTO: 16.9 % (ref 5–12)
NEUTROPHILS NFR BLD AUTO: 1.84 10*3/MM3 (ref 1.7–7)
NEUTROPHILS NFR BLD AUTO: 37.5 % (ref 42.7–76)
NRBC BLD AUTO-RTO: 0 /100 WBC (ref 0–0.2)
PHOSPHATE SERPL-MCNC: 3.1 MG/DL (ref 2.5–4.5)
PLATELET # BLD AUTO: 233 10*3/MM3 (ref 140–450)
PMV BLD AUTO: 8.8 FL (ref 6–12)
POTASSIUM SERPL-SCNC: 4.3 MMOL/L (ref 3.5–5.2)
PROT SERPL-MCNC: 5.1 G/DL (ref 6–8.5)
RBC # BLD AUTO: 3.16 10*6/MM3 (ref 3.77–5.28)
SODIUM SERPL-SCNC: 137 MMOL/L (ref 136–145)
WBC NRBC COR # BLD: 4.91 10*3/MM3 (ref 3.4–10.8)

## 2023-08-23 PROCEDURE — 84100 ASSAY OF PHOSPHORUS: CPT | Performed by: INTERNAL MEDICINE

## 2023-08-23 PROCEDURE — 82948 REAGENT STRIP/BLOOD GLUCOSE: CPT

## 2023-08-23 PROCEDURE — 99233 SBSQ HOSP IP/OBS HIGH 50: CPT | Performed by: INTERNAL MEDICINE

## 2023-08-23 PROCEDURE — 97110 THERAPEUTIC EXERCISES: CPT

## 2023-08-23 PROCEDURE — 83735 ASSAY OF MAGNESIUM: CPT | Performed by: INTERNAL MEDICINE

## 2023-08-23 PROCEDURE — 80053 COMPREHEN METABOLIC PANEL: CPT | Performed by: INTERNAL MEDICINE

## 2023-08-23 PROCEDURE — 25010000002 HEPARIN (PORCINE) PER 1000 UNITS: Performed by: INTERNAL MEDICINE

## 2023-08-23 PROCEDURE — 85025 COMPLETE CBC W/AUTO DIFF WBC: CPT | Performed by: INTERNAL MEDICINE

## 2023-08-23 RX ADMIN — Medication 10 MG: at 21:07

## 2023-08-23 RX ADMIN — SENNOSIDES AND DOCUSATE SODIUM 2 TABLET: 50; 8.6 TABLET ORAL at 08:35

## 2023-08-23 RX ADMIN — MICONAZOLE NITRATE: 2 POWDER TOPICAL at 21:09

## 2023-08-23 RX ADMIN — HEPARIN SODIUM 5000 UNITS: 5000 INJECTION INTRAVENOUS; SUBCUTANEOUS at 21:08

## 2023-08-23 RX ADMIN — HEPARIN SODIUM 5000 UNITS: 5000 INJECTION INTRAVENOUS; SUBCUTANEOUS at 13:30

## 2023-08-23 RX ADMIN — FAMOTIDINE 10 MG: 10 TABLET ORAL at 08:35

## 2023-08-23 RX ADMIN — LEVOTHYROXINE SODIUM 100 MCG: 0.1 TABLET ORAL at 06:09

## 2023-08-23 RX ADMIN — WHITE PETROLATUM 1 APPLICATION: 1.75 OINTMENT TOPICAL at 21:08

## 2023-08-23 RX ADMIN — SODIUM BICARBONATE 650 MG TABLET 650 MG: at 08:35

## 2023-08-23 RX ADMIN — MICONAZOLE NITRATE: 2 POWDER TOPICAL at 09:02

## 2023-08-23 RX ADMIN — SODIUM BICARBONATE 650 MG TABLET 650 MG: at 18:25

## 2023-08-23 RX ADMIN — WHITE PETROLATUM 1 APPLICATION: 1.75 OINTMENT TOPICAL at 09:02

## 2023-08-23 RX ADMIN — SENNOSIDES AND DOCUSATE SODIUM 2 TABLET: 50; 8.6 TABLET ORAL at 21:08

## 2023-08-23 RX ADMIN — HEPARIN SODIUM 5000 UNITS: 5000 INJECTION INTRAVENOUS; SUBCUTANEOUS at 06:09

## 2023-08-23 RX ADMIN — SODIUM BICARBONATE 650 MG TABLET 650 MG: at 21:07

## 2023-08-23 NOTE — THERAPY TREATMENT NOTE
Patient Name: Jayashree Longoria  : 1929    MRN: [id number]                              Today's Date: 2023       Admit Date: 2023    Visit Dx:     ICD-10-CM ICD-9-CM   1. Fall, initial encounter  W19.XXXA E888.9   2. Dehydration  E86.0 276.51   3. HANNAH (acute kidney injury)  N17.9 584.9   4. Abdominal aortic aneurysm (AAA) without rupture, unspecified part  I71.40 441.4   5. Decreased activities of daily living (ADL)  Z78.9 V49.89   6. Difficulty in walking  R26.2 719.7     Patient Active Problem List   Diagnosis    HANNAH (acute kidney injury)    Hypothermia     Past Medical History:   Diagnosis Date    Diabetes mellitus     Disease of thyroid gland     Hyperlipidemia     Hypertension     Renal disorder      History reviewed. No pertinent surgical history.   General Information       Row Name 23 1126          OT Time and Intention    Document Type therapy note (daily note)  -AV     Mode of Treatment individual therapy;occupational therapy  -AV       Row Name 23 1126          General Information    Existing Precautions/Restrictions fall  -AV       Row Name 23 1126          Cognition    Orientation Status (Cognition) --  Lethargic.  Max cues for arousal.  Required max cues/demonstration for exercise completion.  -AV       Row Name 23 1126          Safety Issues, Functional Mobility    Impairments Affecting Function (Mobility) balance;cognition;endurance/activity tolerance;strength  -AV               User Key  (r) = Recorded By, (t) = Taken By, (c) = Cosigned By      Initials Name Provider Type    AV Carlos Recio OT Occupational Therapist                     Mobility/ADL's    No documentation.                  Obj/Interventions       Row Name 23 1127          Shoulder (Therapeutic Exercise)    Shoulder AROM (Therapeutic Exercise) bilateral;flexion;horizontal aBduction/aDduction  x12  -AV       Row Name 23 1127          Elbow/Forearm (Therapeutic Exercise)     Elbow/Forearm AROM (Therapeutic Exercise) bilateral;flexion;supination;pronation  x12  -AV       Row Name 08/23/23 1127          Motor Skills    Therapeutic Exercise --  Performed in high Fowlers/on room air  -AV               User Key  (r) = Recorded By, (t) = Taken By, (c) = Cosigned By      Initials Name Provider Type    Carlos Barragan OT Occupational Therapist                   Goals/Plan    No documentation.                  Clinical Impression       Row Name 08/23/23 1127          Pain Scale: FACES Pre/Post-Treatment    Pain: FACES Scale, Pretreatment 0-->no hurt  -AV     Posttreatment Pain Rating 0-->no hurt  -AV       Row Name 08/23/23 1127          Plan of Care Review    Progress no change  -AV     Outcome Evaluation Patient performed upper extremity AROM exercises with max cues and demonstration.  Continued OT indicated to remediate/compensate for deficits to maximize independence.  -AV       Row Name 08/23/23 1127          Therapy Plan Review/Discharge Plan (OT)    Anticipated Discharge Disposition (OT) sub acute care setting  -AV       Row Name 08/23/23 1127          Vital Signs    O2 Delivery Pre Treatment room air  -AV     O2 Delivery Intra Treatment room air  -AV     O2 Delivery Post Treatment room air  -AV               User Key  (r) = Recorded By, (t) = Taken By, (c) = Cosigned By      Initials Name Provider Type    Carlos Barragan OT Occupational Therapist                   Outcome Measures       Row Name 08/23/23 1128          How much help from another is currently needed...    Putting on and taking off regular lower body clothing? 2  -AV     Bathing (including washing, rinsing, and drying) 2  -AV     Toileting (which includes using toilet bed pan or urinal) 1  -AV     Putting on and taking off regular upper body clothing 2  -AV     Taking care of personal grooming (such as brushing teeth) 3  -AV     Eating meals 4  -AV     AM-PAC 6 Clicks Score (OT) 14  -AV       Row Name 08/23/23 0900           How much help from another person do you currently need...    Turning from your back to your side while in flat bed without using bedrails? 2  -SS     Moving from lying on back to sitting on the side of a flat bed without bedrails? 2  -SS     Moving to and from a bed to a chair (including a wheelchair)? 2  -SS     Standing up from a chair using your arms (e.g., wheelchair, bedside chair)? 2  -SS     Climbing 3-5 steps with a railing? 1  -SS     To walk in hospital room? 2  -SS     AM-PAC 6 Clicks Score (PT) 11  -SS     Highest level of mobility 4 --> Transferred to chair/commode  -SS       Row Name 08/23/23 1128          Optimal Instrument    Bending/Stooping 4  -AV     Standing 2  -AV     Reaching 1  -AV               User Key  (r) = Recorded By, (t) = Taken By, (c) = Cosigned By      Initials Name Provider Type    AV Carlos Recio OT Occupational Therapist    SS Rosanna Buenrostro, RN Registered Nurse                    Occupational Therapy Education       Title: PT OT SLP Therapies (In Progress)       Topic: Occupational Therapy (In Progress)       Point: ADL training (In Progress)       Description:   Instruct learner(s) on proper safety adaptation and remediation techniques during self care or transfers.   Instruct in proper use of assistive devices.                  Learning Progress Summary             Patient Acceptance, E, NR by  at 8/21/2023 0900    Comment: pt appears alert to self    Acceptance, E,TB, VU by REBECCA at 8/16/2023 1524    Acceptance, E,TB, VU by  at 8/16/2023 1430   Family Acceptance, E, NR by  at 8/21/2023 0900    Comment: pt appears alert to self                         Point: Precautions (In Progress)       Description:   Instruct learner(s) on prescribed precautions during self-care and functional transfers.                  Learning Progress Summary             Patient Acceptance, E, NR by  at 8/21/2023 0900    Comment: pt appears alert to self    Acceptance, E,TB, VU by REBECCA at  8/16/2023 1524    Acceptance, E,TB, VU by  at 8/16/2023 1430   Family Acceptance, E, NR by  at 8/21/2023 0900    Comment: pt appears alert to self                         Point: Body mechanics (In Progress)       Description:   Instruct learner(s) on proper positioning and spine alignment during self-care, functional mobility activities and/or exercises.                  Learning Progress Summary             Patient Acceptance, E, NR by  at 8/21/2023 0900    Comment: pt appears alert to self    Acceptance, E,TB, VU by REBECCA at 8/16/2023 1524    Acceptance, E,TB, VU by  at 8/16/2023 1430   Family Acceptance, E, NR by  at 8/21/2023 0900    Comment: pt appears alert to self                                         User Key       Initials Effective Dates Name Provider Type Discipline     06/16/21 -  Mariana Leo, RN Registered Nurse Nurse     06/16/21 -  Leanne Bazzi OT Occupational Therapist OT    REBECCA 06/03/21 -  Syed Tejeda PT Physical Therapist PT                  OT Recommendation and Plan     Plan of Care Review  Progress: no change  Outcome Evaluation: Patient performed upper extremity AROM exercises with max cues and demonstration.  Continued OT indicated to remediate/compensate for deficits to maximize independence.     Time Calculation:         Time Calculation- OT       Row Name 08/23/23 1129             Time Calculation- OT    OT Received On 08/23/23  -AV      OT Goal Re-Cert Due Date 08/25/23  -AV         Timed Charges    76978 - OT Therapeutic Exercise Minutes 10  -AV         Total Minutes    Timed Charges Total Minutes 10  -AV       Total Minutes 10  -AV                User Key  (r) = Recorded By, (t) = Taken By, (c) = Cosigned By      Initials Name Provider Type    AV Carlos Recio OT Occupational Therapist                  Therapy Charges for Today       Code Description Service Date Service Provider Modifiers Qty    [id number] HC OT THER PROC EA 15 MIN 8/23/2023 Carlos Recio  OT GO 1                 Carlos Recio, OT  8/23/2023

## 2023-08-23 NOTE — PLAN OF CARE
Goal Outcome Evaluation:      Pt alert and in pleasant mood today. No complaints. Congested cough continues.  Call light in reach.

## 2023-08-24 VITALS
SYSTOLIC BLOOD PRESSURE: 119 MMHG | HEART RATE: 85 BPM | BODY MASS INDEX: 27.77 KG/M2 | TEMPERATURE: 97.7 F | WEIGHT: 166.67 LBS | RESPIRATION RATE: 20 BRPM | OXYGEN SATURATION: 96 % | DIASTOLIC BLOOD PRESSURE: 65 MMHG | HEIGHT: 65 IN

## 2023-08-24 LAB
ALBUMIN SERPL-MCNC: 2.5 G/DL (ref 3.5–5.2)
ALBUMIN/GLOB SERPL: 1 G/DL
ALP SERPL-CCNC: 91 U/L (ref 39–117)
ALT SERPL W P-5'-P-CCNC: 20 U/L (ref 1–33)
ANION GAP SERPL CALCULATED.3IONS-SCNC: 7 MMOL/L (ref 5–15)
AST SERPL-CCNC: 22 U/L (ref 1–32)
BASOPHILS # BLD AUTO: 0.03 10*3/MM3 (ref 0–0.2)
BASOPHILS NFR BLD AUTO: 0.6 % (ref 0–1.5)
BILIRUB SERPL-MCNC: 0.3 MG/DL (ref 0–1.2)
BUN SERPL-MCNC: 51 MG/DL (ref 8–23)
BUN/CREAT SERPL: 38.3 (ref 7–25)
CALCIUM SPEC-SCNC: 8.9 MG/DL (ref 8.2–9.6)
CHLORIDE SERPL-SCNC: 103 MMOL/L (ref 98–107)
CO2 SERPL-SCNC: 27 MMOL/L (ref 22–29)
CREAT SERPL-MCNC: 1.33 MG/DL (ref 0.57–1)
DEPRECATED RDW RBC AUTO: 64.6 FL (ref 37–54)
EGFRCR SERPLBLD CKD-EPI 2021: 37.4 ML/MIN/1.73
EOSINOPHIL # BLD AUTO: 0.09 10*3/MM3 (ref 0–0.4)
EOSINOPHIL NFR BLD AUTO: 1.7 % (ref 0.3–6.2)
ERYTHROCYTE [DISTWIDTH] IN BLOOD BY AUTOMATED COUNT: 18.6 % (ref 12.3–15.4)
GLOBULIN UR ELPH-MCNC: 2.5 GM/DL
GLUCOSE BLDC GLUCOMTR-MCNC: 92 MG/DL (ref 70–99)
GLUCOSE BLDC GLUCOMTR-MCNC: 96 MG/DL (ref 70–99)
GLUCOSE SERPL-MCNC: 88 MG/DL (ref 65–99)
HCT VFR BLD AUTO: 30.6 % (ref 34–46.6)
HGB BLD-MCNC: 9.4 G/DL (ref 12–15.9)
IMM GRANULOCYTES # BLD AUTO: 0.01 10*3/MM3 (ref 0–0.05)
IMM GRANULOCYTES NFR BLD AUTO: 0.2 % (ref 0–0.5)
LYMPHOCYTES # BLD AUTO: 2.02 10*3/MM3 (ref 0.7–3.1)
LYMPHOCYTES NFR BLD AUTO: 37.1 % (ref 19.6–45.3)
MAGNESIUM SERPL-MCNC: 2.1 MG/DL (ref 1.7–2.3)
MCH RBC QN AUTO: 30.1 PG (ref 26.6–33)
MCHC RBC AUTO-ENTMCNC: 30.7 G/DL (ref 31.5–35.7)
MCV RBC AUTO: 98.1 FL (ref 79–97)
MONOCYTES # BLD AUTO: 0.93 10*3/MM3 (ref 0.1–0.9)
MONOCYTES NFR BLD AUTO: 17.1 % (ref 5–12)
NEUTROPHILS NFR BLD AUTO: 2.36 10*3/MM3 (ref 1.7–7)
NEUTROPHILS NFR BLD AUTO: 43.3 % (ref 42.7–76)
NRBC BLD AUTO-RTO: 0 /100 WBC (ref 0–0.2)
PHOSPHATE SERPL-MCNC: 4.2 MG/DL (ref 2.5–4.5)
PLATELET # BLD AUTO: 217 10*3/MM3 (ref 140–450)
PMV BLD AUTO: 9.1 FL (ref 6–12)
POTASSIUM SERPL-SCNC: 4.1 MMOL/L (ref 3.5–5.2)
PROT SERPL-MCNC: 5 G/DL (ref 6–8.5)
RBC # BLD AUTO: 3.12 10*6/MM3 (ref 3.77–5.28)
SODIUM SERPL-SCNC: 137 MMOL/L (ref 136–145)
WBC NRBC COR # BLD: 5.44 10*3/MM3 (ref 3.4–10.8)

## 2023-08-24 PROCEDURE — 85025 COMPLETE CBC W/AUTO DIFF WBC: CPT | Performed by: INTERNAL MEDICINE

## 2023-08-24 PROCEDURE — 83735 ASSAY OF MAGNESIUM: CPT | Performed by: INTERNAL MEDICINE

## 2023-08-24 PROCEDURE — 80053 COMPREHEN METABOLIC PANEL: CPT | Performed by: INTERNAL MEDICINE

## 2023-08-24 PROCEDURE — 99239 HOSP IP/OBS DSCHRG MGMT >30: CPT | Performed by: INTERNAL MEDICINE

## 2023-08-24 PROCEDURE — 84100 ASSAY OF PHOSPHORUS: CPT | Performed by: INTERNAL MEDICINE

## 2023-08-24 PROCEDURE — 25010000002 HEPARIN (PORCINE) PER 1000 UNITS: Performed by: INTERNAL MEDICINE

## 2023-08-24 PROCEDURE — 82948 REAGENT STRIP/BLOOD GLUCOSE: CPT

## 2023-08-24 RX ORDER — SODIUM BICARBONATE 650 MG/1
650 TABLET ORAL 3 TIMES DAILY
Start: 2023-08-24

## 2023-08-24 RX ADMIN — LEVOTHYROXINE SODIUM 100 MCG: 0.1 TABLET ORAL at 05:19

## 2023-08-24 RX ADMIN — FAMOTIDINE 10 MG: 10 TABLET ORAL at 10:26

## 2023-08-24 RX ADMIN — HEPARIN SODIUM 5000 UNITS: 5000 INJECTION INTRAVENOUS; SUBCUTANEOUS at 05:20

## 2023-08-24 RX ADMIN — WHITE PETROLATUM 1 APPLICATION: 1.75 OINTMENT TOPICAL at 12:13

## 2023-08-24 RX ADMIN — SODIUM BICARBONATE 650 MG TABLET 650 MG: at 10:27

## 2023-08-24 RX ADMIN — MICONAZOLE NITRATE: 2 POWDER TOPICAL at 12:13

## 2023-08-24 NOTE — DISCHARGE SUMMARY
[facility]         HOSPITALIST  DISCHARGE SUMMARY    Patient Name: Jayashree Longoria  : 1929  MRN: [id number]    Date of Admission: 2023  Date of Discharge:  2023  Primary Care Physician: Provider, No Known    Consults       Date and Time Order Name Status Description    2023 12:53 AM Inpatient Vascular Surgery Consult Completed     2023 10:38 PM Hospitalist (on-call MD unless specified)      2023 10:06 PM IP General Consult (Use specialty-specific consult if known)              Active and Resolved Hospital Problems:  DM Type 2  Hypothyroid--uncontrolled  Suspect Dementia  HLD  Fall at home   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 She lives on her own but is checked on regularly by a friend. She came to the ED after being found down. She was hydrated and had HANNAH. CT of the head showed nothing acute. 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 She was seen by vascular surgery and given her advanced age did not feel to be a candidate for intervention. She wished to be DNR. Palliative care was consulted. Hospice was consulted. At this point however patient and her family prefer to go to nursing facility.  Patient is stable for discharge, she is discharged to rehab today in stable condition.  Patient should follow-up with her PCP in 3 to 7 days    Day of Discharge     Vital Signs:  Temp:  [97.3 øF (36.3 øC)-98.8 øF (37.1 øC)] 97.3 øF (36.3 øC)  Heart Rate:  [] 84  Resp:  [16-20] 20  BP: (100-125)/(52-74) 100/52    Physical Exam:   GEN: No acute distress  HEENT: Moist mucous membranes  LUNGS: Equal chest rise bilaterally  CARDIAC: Regular rate and rhythm  NEURO: Moving all 4  extremities spontaneously  SKIN: No obvious breakdown    Discharge Details        Discharge Medications        New Medications        Instructions Start Date   miconazole 2 % powder  Commonly known as: MICOTIN   Topical, Every 12 Hours Scheduled      sodium bicarbonate 650 MG tablet   650 mg, Oral, 3 Times Daily             Continue These Medications        Instructions Start Date   atenolol 50 MG tablet  Commonly known as: TENORMIN   50 mg, Oral, Daily      furosemide 40 MG tablet  Commonly known as: LASIX   40 mg, Oral, Daily      metFORMIN 500 MG tablet  Commonly known as: GLUCOPHAGE   500 mg, Oral, 2 Times Daily With Meals      montelukast 10 MG tablet  Commonly known as: SINGULAIR   10 mg, Oral, Nightly      potassium chloride 20 MEQ CR tablet  Commonly known as: K-DUR,KLOR-CON   20 mEq, Oral, Daily      simvastatin 20 MG tablet  Commonly known as: ZOCOR   20 mg, Oral, Nightly      Synthroid 75 MCG tablet  Generic drug: levothyroxine   75 mcg, Oral, Daily               No Known Allergies    Discharge Disposition:  Skilled Nursing Facility (DC - External)    Diet:  Hospital:  Diet Order   Procedures    Diet: Regular/House Diet; Texture: Regular Texture (IDDSI 7); Fluid Consistency: Thin (IDDSI 0)       Discharge Activity:       CODE STATUS:  Code Status and Medical Interventions:   Ordered at: 08/13/23 0940     Medical Intervention Limits:    NO intubation (DNI)     Level Of Support Discussed With:    Patient     Code Status (Patient has no pulse and is not breathing):    No CPR (Do Not Attempt to Resuscitate)     Medical Interventions (Patient has pulse or is breathing):    Limited Support       No future appointments.    Additional Instructions for the Follow-ups that You Need to Schedule       Discharge Follow-up with PCP   As directed       Currently Documented PCP:    Provider, No Known    PCP Phone Number:    None     Follow Up Details: 3 to 7 days                Pertinent  and/or Most Recent Results      IMAGING:  CT Abdomen Pelvis Without Contrast    Result Date: 8/11/2023  PROCEDURE: CT ABDOMEN PELVIS WO CONTRAST  COMPARISON: 8/11/2023.  INDICATIONS: Aneurysm on x-ray with back pain; generalized lower back pain.  TECHNIQUE: 728 CT images were created without intravenous contrast.   PROTOCOL:   Standard CT imaging protocol performed.    RADIATION:   Total DLP: 599.5 mGy*cm.   Automated exposure control was utilized to minimize radiation dose.  FINDINGS: There is a large (7.6 cm) infrarenal abdominal aortic aneurysm, probably fusiform in configuration.  The aneurysm neck is roughly estimated at 3 cm, as on image 106 of series 202.  The fusiform aneurysm extends is about 9.4 cm caudally to just above the aortic bifurcation.  No acute retroperitoneal or intraperitoneal hemorrhage is seen to suggest aneurysm leakage or rupture.  There may be mild fusiform dilatation of the bilateral common iliac arteries.  The left common iliac artery measures about 1.6 cm in greatest diameter.  The right common iliac artery measures about 1.5 cm in greatest diameter.  The proximal left internal iliac artery measures about 1.4 cm in greatest diameter.  The proximal right internal iliac artery measures about 1.1 cm in greatest diameter.  Extensive atherosclerotic changes are seen.  For instance, extensive arterial calcification involves the proximal bilateral renal arteries.  Minimal calcified plaque is seen at the origins of the celiac trunk and superior mesenteric artery.  The inferior mesenteric artery is not well evaluated by this study; it appears to arise from the lower left anterior margin of the large fusiform aneurysm.   Age-indeterminate vertebral compression fractures are seen at T11 and L1, estimated at 30 percent or less in degree.  Vacuum phenomenon involves the disc interspaces at T11-12, T12-L1, L1-2, L2-3, L3-4, and L4-5.  Severe degenerative changes are seen throughout the imaged spine.  There is suspected  levoscoliosis of the lumbar spine.  Chronic malalignment (at several levels) is seen throughout the lumbar spine.   Additionally, colonic diverticulosis is seen without acute diverticulitis.  No acute appendicitis.  No mechanical bowel obstruction.  No pneumoperitoneum or pneumatosis.  The patient has undergone cholecystectomy.  No acute pancreatitis.  Small to moderate sized hiatal hernia is seen.  There are coronary artery calcifications.  No cardiac enlargement.  A trace amount of pericardial effusion is seen.  There may be tiny bilateral pleural effusions.  Motion artifact obscures detail.  Mild prominence of the adrenal glands is seen, which is nonspecific.  No definite hydronephrosis, nephrolithiasis, or ureterolithiasis.  There are arterial calcifications in close proximity to the distal ureters.  It appears that the patient has undergone hysterectomy.  Degenerative changes involve the bilateral hip joints and the bilateral sacroiliac joints.  There is mild distension of the urinary bladder.         1. There is a 7.6 cm fusiform aneurysm of the infrarenal abdominal aorta, as discussed.  No acute retroperitoneal hemorrhage is seen to suggest aneurysm leakage or rupture.   2. Age-indeterminate (but possibly subacute) vertebral compression fractures are seen at T11 and L1, estimated at 30 percent or less in degree.  There is minimal if any posterior wall retropulsion associated with these findings.   3. There are small bilateral pleural effusions.   4. The patient has undergone hysterectomy.   5. Probably no significant acute findings are seen otherwise.   6. Please see above comments for further detail.     A preliminary report (regarding the abdominal aortic aneurysm, AAA) was given to Dr. Moya (ordering/attending [facility] ED Physician) at approximately 2205 hours on 8/11/2023.   Please note that portions of this note were completed with a voice recognition program.  VALERIA FRIEDMAN JR, MD       Electronically  Signed and Approved By: VALERIA FRIEDMAN JR, MD on 8/11/2023 at 22:18              XR Spine Lumbar 2 or 3 View    Result Date: 8/11/2023  PROCEDURE: XR SPINE LUMBAR 2 OR 3 VW  COMPARISON: [facility], MR, LUMBAR SPINE WITHOUT, 3/02/2010, 17:47.  INDICATIONS: GENERALIZED LOWER BACK PAIN AFTER FALL  FINDINGS:  Mild anterior wedging compression fracture of L1 is of indeterminate age.  This is not evident on 3/2/2010.  Vertebral body heights are otherwise well maintained.  Bony structures have an osteopenic appearance consistent with osteoporosis.  Disc spaces are maintained.  Moderate facet arthropathy is seen at L3-4.  Marked facet arthropathy is seen at L4-5.  A few mm of subluxation of L4 anteriorly over L5 is probably on a degenerative basis, and is unchanged in comparison to 3/2/2010.  Marked facet arthropathy is seen at L5-S1.  Left paravertebral mass measures 11 cm in greatest craniocaudal dimension, and 8 cm in greatest transverse dimension.  Curvilinear calcifications are evident.  This may represent a large abdominal aortic aneurysm, not evident on the prior MR.        Lumbar spine series demonstrating mild anterior wedging compression fracture of L1 of indeterminate age.  Large left paravertebral mass with peripheral curvilinear calcifications may represent an abdominal aortic aneurysm.     HERNANDEZ TILLEY MD       Electronically Signed and Approved By: HERNANDEZ TILLEY MD on 8/11/2023 at 18:53             CT Head Without Contrast    Result Date: 8/11/2023  PROCEDURE: CT HEAD WO CONTRAST  COMPARISON:  None  INDICATIONS: HEAD TRAUMA POST FALL X TODAY, LOC  PROTOCOL:   Standard imaging protocol performed    RADIATION:   DLP: 954.9 mGy*cm   MA and/or KV was adjusted to minimize radiation dose.    TECHNIQUE: CT images were obtained without non-ionic intravenous contrast material.  FINDINGS:  The ventricles, sulci, and cerebellar folia are mildly and diffusely prominent consistent with atrophy.   Ill-defined diminished density in cerebral white matter is consistent with mild gliosis and/or scattered old lacunar infarcts.  There is no CT evidence of acute intracranial hemorrhage, mass, or mass effect.  The orbits have a normal appearance.  The paranasal sinuses, middle ears, and mastoid air cells are well aerated.  No fractures are seen on bone window images.        CT scan of the head without IV contrast demonstrating mild atrophy and white matter changes.  No acute intracranial abnormality is seen.     HERNANDEZ TILLEY MD       Electronically Signed and Approved By: HERNANDEZ TILLEY MD on 8/11/2023 at 19:35             XR Chest 1 View    Result Date: 8/11/2023  PROCEDURE: XR CHEST 1 VW  COMPARISON: [facility], CR, CHEST PA/AP & LAT 2V, 11/06/2010, 21:59.  INDICATIONS: Weak/Dizzy/AMS triage protocol/GENERALIZED WEAKNESS, FALL  FINDINGS:  The heart is normal in size.  The lungs are well-expanded and free of infiltrates.  Healed fracture of the lateral left 6th rib is evident.        No active cardiopulmonary disease is seen.       HERNANDEZ TILLEY MD       Electronically Signed and Approved By: HERNANDEZ TILLEY MD on 8/11/2023 at 18:54             Duplex Venous Lower Extremity - Left CV-READ    Result Date: 8/21/2023    Normal left lower extremity venous duplex scan.       LAB RESULTS:      Lab 08/24/23  0612 08/23/23  0541 08/22/23  0445 08/21/23  0608 08/19/23  0401   WBC 5.44 4.91 4.74 4.66 5.21   HEMOGLOBIN 9.4* 9.9* 9.9* 10.2* 10.3*   HEMATOCRIT 30.6* 30.6* 33.9* 33.0* 31.5*   PLATELETS 217 233 226 251 241   NEUTROS ABS 2.36 1.84 1.86 1.89  --    IMMATURE GRANS (ABS) 0.01 0.01 0.01 0.01  --    LYMPHS ABS 2.02 2.12 2.05 1.97  --    MONOS ABS 0.93* 0.83 0.76 0.71  --    EOS ABS 0.09 0.08 0.02 0.06  --    MCV 98.1* 96.8 106.9* 97.1* 95.7         Lab 08/24/23  0612 08/23/23  0541 08/22/23  0446 08/21/23  0608 08/20/23  0810 08/19/23  0401 08/18/23  0406   SODIUM 137 137 134* 136 138 138 138   POTASSIUM  4.1 4.3 4.3 3.9 3.9 3.6 4.0   CHLORIDE 103 102 104 104 109* 106 108*   CO2 27.0 25.6 21.3* 24.6 19.9* 23.1 20.0*   ANION GAP 7.0 9.4 8.7 7.4 9.1 8.9 10.0   BUN 51* 37* 23 15 16 17 18   CREATININE 1.33* 1.37* 1.45* 1.47* 1.37* 1.43* 1.53*   EGFR 37.4* 36.1* 33.7* 33.2* 36.1* 34.3* 31.6*   GLUCOSE 88 94 93 82 82 90 96   CALCIUM 8.9 8.7 8.0* 7.9* 8.0* 8.2 8.1*   MAGNESIUM 2.1 2.1  --  1.9  --  1.9 2.0   PHOSPHORUS 4.2 3.1 3.1 3.2  --  2.9 2.9         Lab 08/24/23  0612 08/23/23  0541 08/22/23  0446 08/21/23  0608 08/19/23  0401   TOTAL PROTEIN 5.0* 5.1*  --   --   --    ALBUMIN 2.5* 2.8* 2.5* 2.7* 2.8*   GLOBULIN 2.5 2.3  --   --   --    ALT (SGPT) 20 13  --   --   --    AST (SGOT) 22 11  --   --   --    BILIRUBIN 0.3 0.3  --   --   --    ALK PHOS 91 84  --   --   --                      Brief Urine Lab Results  (Last result in the past 365 days)        Color   Clarity   Blood   Leuk Est   Nitrite   Protein   CREAT   Urine HCG        08/16/23 1937 Yellow   Clear   Negative   Negative   Negative   Trace                 Microbiology Results (last 10 days)       ** No results found for the last 240 hours. **          Results for orders placed during the hospital encounter of 08/11/23    Duplex Venous Lower Extremity - Left CV-READ    Interpretation Summary    Normal left lower extremity venous duplex scan.    Results for orders placed during the hospital encounter of 08/11/23    Duplex Venous Lower Extremity - Left CV-READ    Interpretation Summary    Normal left lower extremity venous duplex scan.        Time spent on Discharge including face to face service: Greater than 30 minutes      Electronically signed by Dick Muñoz MD, 08/24/23, 9:06 AM EDT.

## 2023-08-24 NOTE — CONSULTS
Discharge Planning Assessment   Becky     Patient Name: Jayashree Longoria  MRN: [id number]  Today's Date: 8/24/2023    Admit Date: 8/11/2023    Plan: Patient's nephew accepted bed at [facility]. [facility] approved, bed will be available tomorrow 8/24. Updated MD and nephew.     Discharge Plan       Row Name 08/24/23 0947       Plan    Patient/Family in Agreement with Plan yes    Final Discharge Disposition Code 03 - skilled nursing facility (SNF)             Expected Discharge Date and Time       Expected Discharge Date Expected Discharge Time    Aug 24, 2023  8:50 AM        SAGE Lucero

## 2023-08-29 ENCOUNTER — NURSING HOME (OUTPATIENT)
Dept: INTERNAL MEDICINE | Facility: CLINIC | Age: 88
End: 2023-08-29
Payer: MEDICARE

## 2023-08-29 DIAGNOSIS — R53.1 GENERALIZED WEAKNESS: ICD-10-CM

## 2023-08-29 DIAGNOSIS — E03.8 HYPOTHYROIDISM DUE TO HASHIMOTO'S THYROIDITIS: ICD-10-CM

## 2023-08-29 DIAGNOSIS — E11.65 TYPE 2 DIABETES MELLITUS WITH HYPERGLYCEMIA, WITHOUT LONG-TERM CURRENT USE OF INSULIN: ICD-10-CM

## 2023-08-29 DIAGNOSIS — E78.2 MIXED HYPERLIPIDEMIA: ICD-10-CM

## 2023-08-29 DIAGNOSIS — I10 HYPERTENSION, ESSENTIAL: ICD-10-CM
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
## 2023-08-29 DIAGNOSIS — E06.3 HYPOTHYROIDISM DUE TO HASHIMOTO'S THYROIDITIS: ICD-10-CM

## 2023-08-29 DIAGNOSIS — I71.43 INFRARENAL ABDOMINAL AORTIC ANEURYSM (AAA) WITHOUT RUPTURE: ICD-10-CM

## 2023-08-29 DIAGNOSIS — N17.9 AKI (ACUTE KIDNEY INJURY): ICD-10-CM
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
## 2023-08-29 NOTE — PROGRESS NOTES
Nursing Home History and Physical Note      Chandrakant Holcomb MD  [x]  [address], [address]  [place] [address]  Phone: [phone]  Fax: [phone]     PATIENT NAME: Jayashree Longoria                                                                          YOB: 1929            DATE OF SERVICE: 08/29/2023  FACILITY:   [] [facility]   [x] [facility]  [] [facility]  [] Other      HISTORY OF PRESENT ILLNESS: Patient is a pleasant 89-year-old female who is very hard of hearing she is talkative alert but has a difficult time understanding me, most of the time she can read my lips, she follows commands well, she is here because she is weak,.  She was admitted to our hospital August 11 through August 24, 2023, diagnosis included hypothyroidism uncontrolled with possible dementia diabetes, and recent fall, with a 7.6 cm aneurysm of the infrarenal abdominal aorta and vertebral compression fractures at T11 and L1, she was also found to be hypothermic,.    According to the hospital course she was living alone but she checked on regularly by a friend she was found down at home she had acute kidney injury initially and she was rehydrated, CT of the head was unremarkable CT of the abdomen showed a 7.6 cm aneurysm of the infrarenal aorta without hemorrhage or leakage vascular surgery did not feel she would be a good candidate for intervention given her age and she wished to be a DNR, palliative care was consulted hospice consulted, and so she was sent to our facility for possible long-term care and rehab efforts.,  She was put on sodium bicarb because of her kidney issues at time of discharge and she continues on a beta-blocker      PAST MEDICAL & SURGICAL HISTORY:   Past Medical History:   Diagnosis Date    Diabetes mellitus     Disease of thyroid gland     Hyperlipidemia     Hypertension     Renal disorder    Hearing loss  No  past surgical history on file.       MEDICATIONS:  I have reviewed and reconciled the patients medication list in the patients chart at the skilled nursing [facility] today.      ALLERGIES:  No Known Allergies      SOCIAL HISTORY:  Social History     Socioeconomic History    Marital status:    Tobacco Use    Smoking status: Never    Smokeless tobacco: Never   Vaping Use    Vaping Use: Never used   Substance and Sexual Activity    Alcohol use: Never    Drug use: Never    Sexual activity: Not Currently       FAMILY HISTORY:  Family history reviewed briefly with patient but difficult to communicate with patient, and not known to be relevant to her admission given her age      PHYSICAL EXAMINATION:   VITAL SIGNS: 105/53 temperature 97.3, respiratory rate 18,, pulse 68, weight 173, sats were 94% on room air several days ago    PHYSICAL EXAM:, She is pleasant alert talkative but very hard of hearing, her abdomen is obese soft her lower legs showed absent DP pulses bilaterally, her right arm was bigger than her left arm which appears to be a chronic condition there was no swelling nonpitting, cardiac exam revealed a regular rhythm with a 2/6 to 3/6 systolic murmur holosystolic, her lungs were clear laterally and anteriorly, she moves all 4 extremities to my command,      RECORDS REVIEW:   Orders Reviewed.  Labs Reviewed.      ICD-10-CM ICD-9-CM   1. Fall, sequela  W19.XXXS 909.4     E929.3   2. Generalized weakness  R53.1 780.79   3. Type 2 diabetes mellitus with hyperglycemia, without long-term current use of insulin  E11.65 250.00     790.29   4. HANNAH (acute kidney injury)  N17.9 584.9   5. Hypertension, essential  I10 401.9   6. Hypothyroidism due to Hashimoto's thyroiditis  E03.8 244.8    E06.3 245.2   7. Mixed hyperlipidemia  E78.2 272.2   8. Infrarenal abdominal aortic aneurysm (AAA) without rupture  I71.43 441.4       ASSESSMENT/PLAN    Diagnoses and all orders for this visit:    1. Fall, sequela (Primary)    2.  Generalized weakness    3. Type 2 diabetes mellitus with hyperglycemia, without long-term current use of insulin    4. HANNAH (acute kidney injury)    5. Hypertension, essential    6. Hypothyroidism due to Hashimoto's thyroiditis    7. Mixed hyperlipidemia    8. Infrarenal abdominal aortic aneurysm (AAA) without rupture         Fall at home, begin rehab evaluations, supportive care,    Generalized weakness    Hearing loss profound    Anemia mild hemoglobin 10.4 macro 31.8 normal platelets normal white count August 28, 2023    Hypothyroidism, new diagnosis found TSH 39, lab work August 28, 2023 reviewed,    7.6 cm infrarenal abdominal aortic aneurysm nonoperable candidate continue beta-blockers, blood pressure control,    Type 2 diabetes continues metformin 500 mg twice a day, hemoglobin A1c 5.3, will decrease metformin to once a day dosing,    Acute kidney injury on top of chronic kidney disease, continues renal replacement with sodium bicarb 650 3 times daily, creatinine at 1.6 with a BUN of 31 and a bicarb of 34, will stop sodium bicarb at this time August 29, 2023    Hyperlipidemia continues on simvastatin 20 mg daily    Volume issues questionable congestive heart failure continue Lasix and potassium  Chandrakant Holcomb MD

## 2023-09-20 ENCOUNTER — NURSING HOME (OUTPATIENT)
Dept: INTERNAL MEDICINE | Facility: CLINIC | Age: 88
End: 2023-09-20
Payer: MEDICARE

## 2023-09-20 DIAGNOSIS — E78.2 MIXED HYPERLIPIDEMIA: ICD-10-CM

## 2023-09-20 DIAGNOSIS — E11.65 TYPE 2 DIABETES MELLITUS WITH HYPERGLYCEMIA, WITHOUT LONG-TERM CURRENT USE OF INSULIN: ICD-10-CM

## 2023-09-20 DIAGNOSIS — R53.1 GENERALIZED WEAKNESS: ICD-10-CM

## 2023-09-20 DIAGNOSIS — I71.43 INFRARENAL ABDOMINAL AORTIC ANEURYSM (AAA) WITHOUT RUPTURE: ICD-10-CM
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
## 2023-09-20 DIAGNOSIS — I10 HYPERTENSION, ESSENTIAL: ICD-10-CM

## 2023-09-20 DIAGNOSIS — E03.8 HYPOTHYROIDISM DUE TO HASHIMOTO'S THYROIDITIS: ICD-10-CM

## 2023-09-20 DIAGNOSIS — N17.9 AKI (ACUTE KIDNEY INJURY): ICD-10-CM

## 2023-09-20 DIAGNOSIS — E06.3 HYPOTHYROIDISM DUE TO HASHIMOTO'S THYROIDITIS: ICD-10-CM

## 2023-09-20 NOTE — PROGRESS NOTES
Nursing Home Follow-Up Note      Chandrakant Holcomb MD  [x]  [address], [address]  [place] [address]  Phone: [phone]  Fax: [phone]     PATIENT NAME: Jayashree Longoria                                                                          YOB: 1929            DATE OF SERVICE: 09/20/2023  FACILITY:   [] [facility]   [x] [facility]  [] [facility]  [] Other      HISTORY OF PRESENT ILLNESS: Patient was seen for routine rounds, she had a coughing spell while I was in the room but she cleared, she has a little bit of congestion, she was lying flat initially without any respiratory distress, she is confused and hard of hearing, she was supposed to be wearing oxygen but was not,      PAST MEDICAL & SURGICAL HISTORY:   Past Medical History:   Diagnosis Date    Diabetes mellitus     Disease of thyroid gland     Hyperlipidemia     Hypertension     Renal disorder    Chronic hypoxia  Obesity  Hearing loss  Questionable dementia  No past surgical history on file.       MEDICATIONS:  I have reviewed and reconciled the patients medication list in the patients chart at the skilled nursing facility today.        PHYSICAL EXAMINATION:   VITAL SIGNS: Respiratory rate 18 temperature 97.6 blood pressure 120/60 pulse 76    PHYSICAL EXAM: She has some inspiratory crackles at both bases laterally cardiac exam regular rhythm she is hard of hearing her abdomen is obese soft her lower extremities show 1-2+ edema especially above the sock line on the right, she has a bandage on the left plantar aspect of the foot which I removed and she has a dime sized ulcer callus type lesion at the MTP joint between the second and third toes, she is awake and alert, she is generally weak, she will move her legs to command and her arms      RECORDS REVIEW:   Orders Reviewed.  Labs Reviewed.      ICD-10-CM ICD-9-CM   1. Fall, sequela  W19.XXXS 909.4      E929.3   2. HANNAH (acute kidney injury)  N17.9 584.9   3. Mixed hyperlipidemia  E78.2 272.2   4. Generalized weakness  R53.1 780.79   5. Type 2 diabetes mellitus with hyperglycemia, without long-term current use of insulin  E11.65 250.00     790.29   6. Hypothyroidism due to Hashimoto's thyroiditis  E03.8 244.8    E06.3 245.2   7. Hypertension, essential  I10 401.9   8. Infrarenal abdominal aortic aneurysm (AAA) without rupture  I71.43 441.4       ASSESSMENT/PLAN    Diagnoses and all orders for this visit:    1. Fall, sequela (Primary)    2. HANNAH (acute kidney injury)    3. Mixed hyperlipidemia    4. Generalized weakness    5. Type 2 diabetes mellitus with hyperglycemia, without long-term current use of insulin    6. Hypothyroidism due to Hashimoto's thyroiditis    7. Hypertension, essential    8. 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, continues on nystatin cream and powder as needed    7.6 cm infrarenal abdominal aortic aneurysm nonoperable candidate continue atenolol 50 mg daily     Type 2 diabetes continues metformin 500 mg daily, hemoglobin A1c 5.3,, no Accu-Cheks are being done at this time    Vitamin D deficiency continues on replacement 1000 units daily    Acute kidney injury on top of chronic kidney disease, continues renal replacement with sodium bicarb 650 3 times daily, creatinine at 1.6 with a BUN of 31 and a bicarb of 34, will stop sodium bicarb at this time  August 29, 2023    Hyperlipidemia continues on simvastatin 20 mg daily    Volume issues BR NP levels are low, creatinine is up slightly, cautious use of diuretics, may need to decrease Lasix to every other day --- lower extremity edema, continue Lasix 40 mg daily, potassium 20 mEq daily, potassium is low consider increasing dose, September 20, 2023    Patient has lab work ordered routinely,  Chandrakant Holcomb MD

## 2023-10-25 ENCOUNTER — NURSING HOME (OUTPATIENT)
Dept: INTERNAL MEDICINE | Facility: CLINIC | Age: 88
End: 2023-10-25
Payer: MEDICARE

## 2023-10-25 DIAGNOSIS — N17.9 AKI (ACUTE KIDNEY INJURY): ICD-10-CM

## 2023-10-25 DIAGNOSIS — I71.43 INFRARENAL ABDOMINAL AORTIC ANEURYSM (AAA) WITHOUT RUPTURE: ICD-10-CM

## 2023-10-25 DIAGNOSIS — I10 HYPERTENSION, ESSENTIAL: ICD-10-CM

## 2023-10-25 DIAGNOSIS — R53.1 GENERALIZED WEAKNESS: Primary | ICD-10-CM

## 2023-10-25 DIAGNOSIS — E11.65 TYPE 2 DIABETES MELLITUS WITH HYPERGLYCEMIA, WITHOUT LONG-TERM CURRENT USE OF INSULIN: ICD-10-CM

## 2023-10-25 DIAGNOSIS — E03.8 HYPOTHYROIDISM DUE TO HASHIMOTO'S THYROIDITIS: ICD-10-CM

## 2023-10-25 DIAGNOSIS — E78.2 MIXED HYPERLIPIDEMIA: ICD-10-CM

## 2023-10-25 DIAGNOSIS — E06.3 HYPOTHYROIDISM DUE TO HASHIMOTO'S THYROIDITIS: ICD-10-CM

## 2023-10-25 NOTE — PROGRESS NOTES
Nursing Home Follow-Up Note      Chandrakant Holcomb MD  [x]  [address], [address]  [place] [address]  Phone: [phone]  Fax: [phone]     PATIENT NAME: Jayashree Longoria                                                                          YOB: 1929            DATE OF SERVICE: 10/25/2023  FACILITY:   [] [facility]   [x] [facility]  [] [facility]  [] Other      HISTORY OF PRESENT ILLNESS: Patient was seen today for routine rounds she was pleasant she was in good spirits she was very talkative, she wants to know when she can go home, she says she does not walk much anymore, she says her feet tingle when she tries to move them, but she is very pleasant in no distress      PAST MEDICAL & SURGICAL HISTORY:   Past Medical History:   Diagnosis Date    Diabetes mellitus     Disease of thyroid gland     Hyperlipidemia     Hypertension     Renal disorder       No past surgical history on file.       MEDICATIONS:  I have reviewed and reconciled the patients medication list in the patients chart at the skilled nursing facility today.        PHYSICAL EXAMINATION:   VITAL SIGNS: 120/63 sat 95% on room air, she is alert and oriented x2, temperature is 98.4 respiratory rate 18 pulse of 84    PHYSICAL EXAM: She is awake alert pleasant her regular cardiac exam reveals a regular rhythm her lungs were clear anteriorly she had a right mastectomy her abdomen is obese soft her mouth shows dry mucosa she can move her lower legs some to command she does not have any edema she is got a little bit of stasis dermatitis changes, and she is very talkative and pleasant      RECORDS REVIEW:   Orders Reviewed.  Labs Reviewed.      ICD-10-CM ICD-9-CM   1. Generalized weakness  R53.1 780.79   2. Type 2 diabetes mellitus with hyperglycemia, without long-term current use of insulin  E11.65 250.00     790.29   3. Mixed hyperlipidemia  E78.2  272.2   4. Hypertension, essential  I10 401.9   5. HANNAH (acute kidney injury)  N17.9 584.9   6. Hypothyroidism due to Hashimoto's thyroiditis  E03.8 244.8    E06.3 245.2   7. Infrarenal abdominal aortic aneurysm (AAA) without rupture  I71.43 441.4       ASSESSMENT/PLAN    Diagnoses and all orders for this visit:    1. Generalized weakness (Primary)    2. Type 2 diabetes mellitus with hyperglycemia, without long-term current use of insulin    3. Mixed hyperlipidemia    4. Hypertension, essential    5. HANNAH (acute kidney injury)    6. Hypothyroidism due to Hashimoto's thyroiditis    7. Infrarenal abdominal aortic aneurysm (AAA) without rupture        Fall at home,, continues restorative type care, possibly long-term, overall stable October 25, 2023    Generalized weakness,     Cough congestion,/asthma, continues on a Breo inhaler daily, Singulair 10 mg daily,    Hearing loss profound    Anemia, stable September 13, 2023    Hypothyroidism, new diagnosis found TSH 39, lab work August 28, 2023 reviewed,, continues and started on levothyroxine 75 mcg daily, over replaced October 11, 2023, we will follow-up labs every 2 to 3 months    Allergies, continues  Singulair 10 mg daily,    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, continues on nystatin cream and powder as needed    7.6 cm infrarenal abdominal aortic aneurysm nonoperable candidate, continue metoprolol extended release 50 mg daily     Type 2 diabetes continues metformin 500 mg daily, hemoglobin A1c 5.3, August 28, 2023    Vitamin D deficiency continues replacement 1000 units daily    Acute kidney injury on top of chronic kidney disease, off sodium bicarb , creatinine improved, 1.4 October 11, 2023    Hyperlipidemia continues  simvastatin 20 mg daily    Volume issues BR NP levels are low,  continue Lasix 40 mg daily, potassium 20 mEq daily,     Patient has lab work ordered routinely,    Chandrakant Holcomb MD

## 2023-12-13 ENCOUNTER — NURSING HOME (OUTPATIENT)
Dept: INTERNAL MEDICINE | Facility: CLINIC | Age: 88
End: 2023-12-13
Payer: MEDICARE

## 2023-12-13 DIAGNOSIS — L03.119 CELLULITIS OF LOWER EXTREMITY, UNSPECIFIED LATERALITY: Primary | ICD-10-CM

## 2023-12-13 DIAGNOSIS — E11.65 TYPE 2 DIABETES MELLITUS WITH HYPERGLYCEMIA, WITHOUT LONG-TERM CURRENT USE OF INSULIN: ICD-10-CM

## 2023-12-13 DIAGNOSIS — E03.8 HYPOTHYROIDISM DUE TO HASHIMOTO'S THYROIDITIS: ICD-10-CM

## 2023-12-13 DIAGNOSIS — R60.0 LOCALIZED EDEMA: ICD-10-CM

## 2023-12-13 DIAGNOSIS — R53.1 GENERALIZED WEAKNESS: ICD-10-CM

## 2023-12-13 DIAGNOSIS — E78.2 MIXED HYPERLIPIDEMIA: ICD-10-CM

## 2023-12-13 DIAGNOSIS — E06.3 HYPOTHYROIDISM DUE TO HASHIMOTO'S THYROIDITIS: ICD-10-CM

## 2023-12-13 DIAGNOSIS — I10 HYPERTENSION, ESSENTIAL: ICD-10-CM

## 2023-12-13 DIAGNOSIS — I71.43 INFRARENAL ABDOMINAL AORTIC ANEURYSM (AAA) WITHOUT RUPTURE: ICD-10-CM

## 2023-12-13 NOTE — PROGRESS NOTES
Nursing Home Follow-Up Note      Chandrakant Holcomb MD  [x]  [address] at the [facility], [address]  [place] [address]  Phone: [phone]  Fax: [phone]     PATIENT NAME: Jayashree Longoria                                                                          YOB: 1929            DATE OF SERVICE: 12/13/2023  FACILITY:   [] [facility]   [x] [facility]  [] [facility]  [] Other      HISTORY OF PRESENT ILLNESS: Patient is a pleasant 89-year-old female who arouses very easily when I walked in the room she tells me her legs hurt and they are red and swollen she is worried about them, the nurses did not report any new issues, she is being seen for routine rounds      PAST MEDICAL & SURGICAL HISTORY:   Past Medical History:   Diagnosis Date    Diabetes mellitus     Disease of thyroid gland     Hyperlipidemia     Hypertension     Renal disorder    Chronic edema in the lower legs,  Right upper extremity lymphedema secondary to previous mastectomy,  No past surgical history on file.       MEDICATIONS:  I have reviewed and reconciled the patients medication list in the patients chart at the skilled nursing facility today.        PHYSICAL EXAMINATION:   VITAL SIGNS: 114/66, sat 95% on room air pulse 72 respiratory rate 18, temperature 98    PHYSICAL EXAM: She is alert talkative pleasant coherent answering simple questions well her abdomen is obese soft her lungs are clear her cardiac exam reveals a regular rhythm which she is got a 3/6 systolic murmur heard over the entire precordium she is got redness and swelling and tenderness to palpation of the lower legs from the proximal PreTAB regions bilaterally down to the ankles with 2-3+ edema her right upper extremity appears to be chronically edematous and swollen from the shoulder down, she is got some softness to the right heel, the left heel does not show any bogginess,      RECORDS  REVIEW:   Orders Reviewed.  Labs Reviewed.      ICD-10-CM ICD-9-CM   1. Cellulitis of lower extremity, unspecified laterality  L03.119 682.6   2. Hypertension, essential  I10 401.9   3. Mixed hyperlipidemia  E78.2 272.2   4. Hypothyroidism due to Hashimoto's thyroiditis  E03.8 244.8    E06.3 245.2   5. Generalized weakness  R53.1 780.79   6. Infrarenal abdominal aortic aneurysm (AAA) without rupture  I71.43 441.4   7. Type 2 diabetes mellitus with hyperglycemia, without long-term current use of insulin  E11.65 250.00     790.29   8. Localized edema  R60.0 782.3       ASSESSMENT/PLAN    Diagnoses and all orders for this visit:    1. Cellulitis of lower extremity, unspecified laterality (Primary)    2. Hypertension, essential    3. Mixed hyperlipidemia    4. Hypothyroidism due to Hashimoto's thyroiditis    5. Generalized weakness    6. Infrarenal abdominal aortic aneurysm (AAA) without rupture    7. Type 2 diabetes mellitus with hyperglycemia, without long-term current use of insulin    8. Localized edema        Fall at home,, continues restorative type care,, doing well maintaining    Lower extremity edema redness possible cellulitis versus chronic venous insufficiency changes, discussed with nursing staff December 13, 2023 we will start with Keflex 500 3 times daily for 10 days, Kenalog cream to the affected area with wound care evaluation ace wraps and Unna boot dressings,    Generalized weakness,, continues restorative getting up and down with assistance    Cough congestion,/asthma, continues  Breo inhaler daily, Singulair 10 mg daily,    Hearing loss --profound    Anemia,  labs noted November 8, 2023 hemoglobin 10.4    Hypothyroidism, new diagnosis found TSH 39, lab work August 28, 2023 reviewed,, continues and started on levothyroxine 75 mcg daily, over replaced October 11, 2023, we will follow-up labs every 2 to 3 months    Allergies, continues  Singulair 10 mg daily,    Tenia of the abdominal and breast fold  areas, continues on nystatin cream and powder as needed    7.6 cm infrarenal abdominal aortic aneurysm nonoperable candidate, continue metoprolol extended release 50 mg daily     Type 2 diabetes continues metformin 500 mg daily, hemoglobin A1c 5.3, August 28, 2023    Vitamin D deficiency continues replacement 1000 units daily    Acute kidney injury on top of chronic kidney disease, off sodium bicarb , creatinine improved, 1.4 October 11, 2023, stable again 1.5 November 2023    Hyperlipidemia continues  simvastatin 20 mg daily    Volume issues BR NP levels are low,  continue Lasix 40 mg daily, potassium 20 mEq daily,     Patient has lab work ordered routinely,, add hemoglobin A1c to routine labs    Chandrakant Holcomb MD

## 2023-12-16 ENCOUNTER — NURSING HOME (OUTPATIENT)
Dept: INTERNAL MEDICINE | Facility: CLINIC | Age: 88
End: 2023-12-16
Payer: MEDICARE

## 2023-12-16 DIAGNOSIS — L03.119 CELLULITIS OF ANKLE: Primary | ICD-10-CM

## 2023-12-16 DIAGNOSIS — L97.329 LOWER LIMB ULCER, ANKLE, LEFT, WITH UNSPECIFIED SEVERITY: ICD-10-CM

## 2023-12-16 NOTE — PROGRESS NOTES
Nursing Home Follow-Up Note      Chandrakant Holcomb MD  [x]  [address], [address]  [place] [address]  Phone: [phone]  Fax: [phone]     PATIENT NAME: Jayashree Longoria                                                                          YOB: 1929            DATE OF SERVICE: 12/16/2023  FACILITY:   [] [facility]   [x] [facility]  [] [facility]  [] Other      HISTORY OF PRESENT ILLNESS: I was contacted by the nurse regarding a pressure area on the patient's left foot and heel and she was very concerned she text me during the night about it and wanted to know about treatment options, so I came and saw her this morning, the patient says she is feeling fine she is already dressed out in the lobby not in any distress the other nurse that came on says she can take care of it with some pressure-relief measures she thinks it is from her shoe rubbing over the left lateral malleolus,  Patient does say her left lower leg does hurt and it is painful to touch, it is slightly reddened    PAST MEDICAL & SURGICAL HISTORY:   Past Medical History:   Diagnosis Date    Diabetes mellitus     Disease of thyroid gland     Hyperlipidemia     Hypertension     Renal disorder       No past surgical history on file.       MEDICATIONS:  I have reviewed and reconciled the patients medication list in the patients chart at the skilled nursing facility today.        PHYSICAL EXAMINATION:   VITAL SIGNS: Patient's vital signs 111/76 sat 96% pulse of 66 8 respiratory rate 18 temperature 97.8    PHYSICAL EXAM: The patient sitting up she is alert talkative pleasant she can move her lower legs to command but she is weak with her legs she does not get up and walk on her own she stays in the wheelchair transfers with assistance, she is got some redness and warmth to the left lower leg from the mid PreTAB region down that is tender to palpation  she also got a large scabbed ulcerated dried area on the left lateral malleolus that is about a nickel to dime size, it is tender to palpation, there is some swelling to the foot but it is minimal 1+ at most,      RECORDS REVIEW:   Orders Reviewed.  Labs Reviewed.      ICD-10-CM ICD-9-CM   1. Cellulitis of ankle  L03.119 682.6   2. Lower limb ulcer, ankle, left, with unspecified severity  L97.329 707.13       ASSESSMENT/PLAN    Diagnoses and all orders for this visit:    1. Cellulitis of ankle (Primary)    2. Lower limb ulcer, ankle, left, with unspecified severity      Left lateral foot leg ulcer, pressure,    Possible cellulitis to the left lower leg, consider treatment options with pressure-relief measures for the ulcer, wound care, protective dressings, and antibiotics, discussed with nursing staff December 16, 2023    Chandrakant Holcomb MD

## 2023-12-21 ENCOUNTER — NURSING HOME (OUTPATIENT)
Dept: INTERNAL MEDICINE | Facility: CLINIC | Age: 88
End: 2023-12-21
Payer: MEDICARE

## 2023-12-21 DIAGNOSIS — M79.604 PAIN IN BOTH LOWER EXTREMITIES: ICD-10-CM

## 2023-12-21 DIAGNOSIS — I73.9 ARTERIAL INSUFFICIENCY OF LOWER EXTREMITY: Primary | ICD-10-CM

## 2023-12-21 DIAGNOSIS — M79.605 PAIN IN BOTH LOWER EXTREMITIES: ICD-10-CM

## 2023-12-21 NOTE — PROGRESS NOTES
Nursing Home Follow-Up Note      Chandrakant Holcomb MD  [x]  [address], [address]  [place] [address]  Phone: [phone]  Fax: [phone]     PATIENT NAME: Jayashree Longoria                                                                          YOB: 1929            DATE OF SERVICE: 12/21/2023  FACILITY:   [] [facility]   [x] [facility]  [] [facility]  [] Other      HISTORY OF PRESENT ILLNESS: Patient is being seen due to severe leg pain that started during the night the nurses texted me stating that she was having unrelieved pain and pedal pulses were faint the toes to change colors and she was constantly hollering out saying her not feet were numb and tingly and were in pain and that all she has is Tylenol, on exam the patient says her feet are feeling much better since it took him off about 3 pillows each and put him back down flat in the bed, she says they were elevated too high and that was the whole problem to begin with, she says that they are tender to touch anyway, she is in no distress she is very talkative and alert      PAST MEDICAL & SURGICAL HISTORY:   Past Medical History:   Diagnosis Date    Diabetes mellitus     Disease of thyroid gland     Hyperlipidemia     Hypertension     Renal disorder       No past surgical history on file.       MEDICATIONS:  I have reviewed and reconciled the patients medication list in the patients chart at the skilled nursing facility today.        PHYSICAL EXAMINATION:   VITAL SIGNS: 128/73 pulse 74 temperature 99.4 sat 95% on room air    PHYSICAL EXAM: Lungs are clear laterally and anteriorly cardiac exam regular rhythm with a 2/6 systolic murmur left upper sternal border her abdomen is obese soft she is very talkative she is very alert she can move her legs to command but she states they are sore to touch she is does have redness from the mid PreTAB region to the  ankles bilaterally he actually looks better than it did several days ago when I saw her she is very tender to touch, I could not palpate any DP pulses bilateral      RECORDS REVIEW:   Orders Reviewed.  Labs Reviewed.      ICD-10-CM ICD-9-CM   1. Arterial insufficiency of lower extremity  I73.9 443.9   2. Pain in both lower extremities  M79.604 729.5    M79.605        ASSESSMENT/PLAN    Diagnoses and all orders for this visit:    1. Arterial insufficiency of lower extremity (Primary)    2. Pain in both lower extremities        Left lateral foot leg ulcer, pressure,    Fall at home,, continues restorative type care,, doing well maintaining    Lower extremity edema redness possible cellulitis versus chronic venous insufficiency changes, discussed with nursing staff December 13, 2023 we will start with Keflex 500 3 times daily for 10 days, Kenalog cream to the affected area with wound care evaluation ace wraps and Unna boot dressings, overall some better,    Arterial insufficiency of the lower legs, and worsening leg pain overnight December 21, 2023, discussed with nursing staff------------------ most likely given absent pulses given her age comorbidities and the large a abdominal aortic aneurysm I do not feel an arterial evaluation is warranted at this point we will monitor her and treat her conservatively December 21, 2023    Generalized weakness,, continues restorative getting up and down with assistance    asthma, continues  Breo inhaler daily, Singulair 10 mg daily,, Spiriva inhaler once daily    Hearing loss --profound    Anemia,  labs noted November 8, 2023 hemoglobin 10.4    Hypothyroidism, new diagnosis found TSH 39, lab work August 28, 2023 reviewed,, continues levothyroxine 75 mcg daily, labs November 2023 much improved    Allergies, continues  Singulair 10 mg daily,    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, continues on nystatin cream and powder as needed    7.6 cm infrarenal abdominal aortic aneurysm  nonoperable candidate, continue metoprolol extended release 50 mg daily     Type 2 diabetes continues metformin 500 mg daily, hemoglobin A1c 5.3, August 28, 2023    Vitamin D deficiency continues replacement 1000 units daily    Acute kidney injury on top of chronic kidney disease, off sodium bicarb , creatinine improved, 1.4 October 11, 2023, stable again 1.5 November 2023    Hyperlipidemia continues  simvastatin 20 mg daily    Volume issues BR NP levels are low,  continue Lasix 40 mg daily, potassium 20 mEq daily,     Patient has lab work ordered routinely,, add hemoglobin A1c to routine labs    Chandrakant Holcomb MD

## 2024-01-23 ENCOUNTER — NURSING HOME (OUTPATIENT)
Dept: INTERNAL MEDICINE | Facility: CLINIC | Age: 89
End: 2024-01-23
Payer: MEDICARE

## 2024-01-23 DIAGNOSIS — I10 HYPERTENSION, ESSENTIAL: ICD-10-CM

## 2024-01-23 DIAGNOSIS — I71.43 INFRARENAL ABDOMINAL AORTIC ANEURYSM (AAA) WITHOUT RUPTURE: ICD-10-CM

## 2024-01-23 DIAGNOSIS — R60.0 LOCALIZED EDEMA: ICD-10-CM

## 2024-01-23 DIAGNOSIS — L97.329 LOWER LIMB ULCER, ANKLE, LEFT, WITH UNSPECIFIED SEVERITY: ICD-10-CM

## 2024-01-23 DIAGNOSIS — I73.9 ARTERIAL INSUFFICIENCY OF LOWER EXTREMITY: ICD-10-CM

## 2024-01-23 DIAGNOSIS — N18.31 STAGE 3A CHRONIC KIDNEY DISEASE: ICD-10-CM

## 2024-01-23 DIAGNOSIS — E06.3 HYPOTHYROIDISM DUE TO HASHIMOTO'S THYROIDITIS: Primary | ICD-10-CM

## 2024-01-23 DIAGNOSIS — E11.65 TYPE 2 DIABETES MELLITUS WITH HYPERGLYCEMIA, WITHOUT LONG-TERM CURRENT USE OF INSULIN: ICD-10-CM

## 2024-01-23 DIAGNOSIS — R53.1 GENERALIZED WEAKNESS: ICD-10-CM

## 2024-01-23 DIAGNOSIS — E03.8 HYPOTHYROIDISM DUE TO HASHIMOTO'S THYROIDITIS: Primary | ICD-10-CM

## 2024-01-23 DIAGNOSIS — L03.119 CELLULITIS OF ANKLE: ICD-10-CM

## 2024-01-23 PROCEDURE — 99309 SBSQ NF CARE MODERATE MDM 30: CPT | Performed by: INTERNAL MEDICINE

## 2024-01-23 NOTE — PROGRESS NOTES
Nursing Home Follow-Up Note      Chandrakant Holcomb MD  [x]  [address], [address]  [place] [address]  Phone: [phone]  Fax: [phone]     PATIENT NAME: Jayashree Longoria                                                                          YOB: 1929            DATE OF SERVICE: 01/23/2024  FACILITY:   [] [facility]   [x] [facility]  [] [facility]  [] Other      HISTORY OF PRESENT ILLNESS: Patient seen for routine rounds, she is in good spirits she is very talkative pleasant alert, no distress, she says her leg has a wound on the left lower ankle area, there is a bandage in place, she is got them floated and elevated currently, she had the TV up very loudly when I walked in the room but she was asleep, she is in no distress      PAST MEDICAL & SURGICAL HISTORY:   Past Medical History:   Diagnosis Date    Diabetes mellitus     Disease of thyroid gland     Hyperlipidemia     Hypertension     Renal disorder    Obesity  Chronic venous insufficiency of the lower legs,  Immobilization syndrome  No past surgical history on file.       MEDICATIONS:  I have reviewed and reconciled the patients medication list in the patients chart at the skilled nursing facility today.        PHYSICAL EXAMINATION:   VITAL SIGNS: 120/70 sat 96% on room air, temperature 98.1 respiratory rate 17    PHYSICAL EXAM: She is very talkative alert she is a little hard of hearing her neck is supple her abdomen is obese soft her cardiac exam regular rhythm with a 2/6 to 3/6 systolic murmur she is got a bandage on the left lateral ankle that is partially saturated she has chronic stasis changes of both lower legs with some swelling diffusely from the knees to the ankles bilateral of 1-2+ with redness noted bilateral, she can wiggle her feet and move her toes she moves her arms well, her right arm is chronically enlarged compared to the  left,      RECORDS REVIEW:   Orders Reviewed.  Labs Reviewed.      ICD-10-CM ICD-9-CM   1. Hypothyroidism due to Hashimoto's thyroiditis  E03.8 244.8    E06.3 245.2   2. Generalized weakness  R53.1 780.79   3. Localized edema  R60.0 782.3   4. Type 2 diabetes mellitus with hyperglycemia, without long-term current use of insulin  E11.65 250.00     790.29   5. Cellulitis of ankle  L03.119 682.6   6. Hypertension, essential  I10 401.9   7. Lower limb ulcer, ankle, left, with unspecified severity  L97.329 707.13   8. Arterial insufficiency of lower extremity  I73.9 443.9   9. Infrarenal abdominal aortic aneurysm (AAA) without rupture  I71.43 441.4   10. Stage 3a chronic kidney disease  N18.31 585.3       ASSESSMENT/PLAN    Diagnoses and all orders for this visit:    1. Hypothyroidism due to Hashimoto's thyroiditis (Primary)    2. Generalized weakness    3. Localized edema    4. Type 2 diabetes mellitus with hyperglycemia, without long-term current use of insulin    5. Cellulitis of ankle    6. Hypertension, essential    7. Lower limb ulcer, ankle, left, with unspecified severity    8. Arterial insufficiency of lower extremity    9. Infrarenal abdominal aortic aneurysm (AAA) without rupture    10. Stage 3a chronic kidney disease        Left lateral foot leg ulcer, pressure,, continue wound care,    Fall at home,    Lower extremity edema redness possible cellulitis versus chronic venous insufficiency changes, discussed with nursing staff December 13, 2023, and again January 23, 2024, she has previously been treated with Keflex, discussion regarding compression wraps, cortisone cream and Unna boot dressings etc., continues Lasix 40 mg daily, potassium 20 mEq daily    Arterial insufficiency of the lower legs,     large a abdominal aortic aneurysm     Generalized weakness,, continues restorative getting up and down with assistance    asthma, continues  Breo inhaler daily, Singulair 10 mg daily,, Spiriva inhaler once  daily    Hearing loss --profound    Anemia,     Hypothyroidism, new diagnosis found TSH 39, lab work August 28, 2023 reviewed,, continues levothyroxine, dose increased to 88 after recent labs show TSH back up to 20-- December 2023    Allergies, continues  Singulair 10 mg daily,    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, continues on nystatin cream and powder as needed    7.6 cm infrarenal abdominal aortic aneurysm nonoperable candidate, continue metoprolol extended release 50 mg daily     Type 2 diabetes continues metformin 500 mg daily, hemoglobin A1c 5.3, August 28, 2023    Vitamin D deficiency continues replacement 1000 units daily    Acute kidney injury on top of chronic kidney disease, off sodium bicarb ,     Hyperlipidemia continues  simvastatin 20 mg daily    Volume issues ---------BR NP levels are low,  continue Lasix 40 mg daily, potassium 20 mEq daily,     Chandrakant Holcomb MD

## 2024-03-25 ENCOUNTER — NURSING HOME (OUTPATIENT)
Dept: INTERNAL MEDICINE | Age: 89
End: 2024-03-25
Payer: MEDICARE

## 2024-03-25 DIAGNOSIS — E06.3 HYPOTHYROIDISM DUE TO HASHIMOTO'S THYROIDITIS: ICD-10-CM

## 2024-03-25 DIAGNOSIS — R53.1 GENERALIZED WEAKNESS: ICD-10-CM

## 2024-03-25 DIAGNOSIS — E11.65 TYPE 2 DIABETES MELLITUS WITH HYPERGLYCEMIA, WITHOUT LONG-TERM CURRENT USE OF INSULIN: ICD-10-CM

## 2024-03-25 DIAGNOSIS — I73.9 ARTERIAL INSUFFICIENCY OF LOWER EXTREMITY: ICD-10-CM

## 2024-03-25 DIAGNOSIS — I71.43 INFRARENAL ABDOMINAL AORTIC ANEURYSM (AAA) WITHOUT RUPTURE: ICD-10-CM

## 2024-03-25 DIAGNOSIS — N18.31 STAGE 3A CHRONIC KIDNEY DISEASE: ICD-10-CM

## 2024-03-25 DIAGNOSIS — E78.2 MIXED HYPERLIPIDEMIA: ICD-10-CM

## 2024-03-25 DIAGNOSIS — E03.8 HYPOTHYROIDISM DUE TO HASHIMOTO'S THYROIDITIS: ICD-10-CM

## 2024-03-25 DIAGNOSIS — I10 HYPERTENSION, ESSENTIAL: Primary | ICD-10-CM

## 2024-03-25 PROCEDURE — 99309 SBSQ NF CARE MODERATE MDM 30: CPT | Performed by: INTERNAL MEDICINE

## 2024-03-25 NOTE — PROGRESS NOTES
Nursing Home Follow-Up Note      Chandrakant Holcomb MD  [x]  [address], [address]  [place] [address]  Phone: [phone]  Fax: [phone]     PATIENT NAME: Jayashree Longoria                                                                          YOB: 1929            DATE OF SERVICE: 03/25/2024  FACILITY:   [] [facility]   [x] [facility]  [] [facility]  [] Other      HISTORY OF PRESENT ILLNESS: Patient was seen today for routine rounds she is in good spirits no real complaints or distress she is pleasant she is talkative she does not remember me she says, no other new issues,      PAST MEDICAL & SURGICAL HISTORY:   Past Medical History:   Diagnosis Date    Diabetes mellitus     Disease of thyroid gland     Hyperlipidemia     Hypertension     Renal disorder    Chronic right upper extremity edema  Obesity  No past surgical history on file.       MEDICATIONS:  I have reviewed and reconciled the patients medication list in the patients chart at the skilled nursing facility today.        PHYSICAL EXAMINATION:   VITAL SIGNS: 110/65 and 112/64 sat 96% on room air pulse 74 respiratory rate 18 temperature 98.2    PHYSICAL EXAM: Lungs are clear anteriorly and laterally cardiac exam regular rhythm with a 2/6 to 3/6 systolic murmur her abdomen slightly obese soft nontender her neck is supple without any adenopathy her lower legs show marked redness around the lower to mid PreTAB regions down to the ankles bilateral chronic, slightly warm to touch, she can move all 4 extremities well, her right arm is larger than the left chronically, she is pleasant talkative      RECORDS REVIEW:   Orders Reviewed.  Labs Reviewed.      ICD-10-CM ICD-9-CM   1. Hypertension, essential  I10 401.9   2. Infrarenal abdominal aortic aneurysm (AAA) without rupture  I71.43 441.4   3. Stage 3a chronic kidney disease  N18.31 585.3   4. Hypothyroidism  due to Hashimoto's thyroiditis  E03.8 244.8    E06.3 245.2   5. Mixed hyperlipidemia  E78.2 272.2   6. Generalized weakness  R53.1 780.79   7. Type 2 diabetes mellitus with hyperglycemia, without long-term current use of insulin  E11.65 250.00     790.29   8. Arterial insufficiency of lower extremity  I73.9 443.9       ASSESSMENT/PLAN    Diagnoses and all orders for this visit:    1. Hypertension, essential (Primary)    2. Infrarenal abdominal aortic aneurysm (AAA) without rupture    3. Stage 3a chronic kidney disease    4. Hypothyroidism due to Hashimoto's thyroiditis    5. Mixed hyperlipidemia    6. Generalized weakness    7. Type 2 diabetes mellitus with hyperglycemia, without long-term current use of insulin    8. Arterial insufficiency of lower extremity        Left lateral foot leg ulcer, pressure,, continue wound care,, improving, March 25, 2024    Fall at home,, continues long-term care now, continues getting up with restorative into the wheelchair on a regular basis,    Lower extremity edema redness, chronic issue, most likely chronic stasis dermatitis,,, continues Lasix 40 mg daily, potassium 20 mEq daily, triamcinolone cream, wound care to the lower leg, heel protectors,    Arterial insufficiency of the lower legs,     large a abdominal aortic aneurysm     Generalized weakness,, continues restorative getting up and down with assistance, stable    asthma, continues Symbicort 2 puffs twice a day, Singulair 10 mg daily,, Spiriva inhaler once daily    Hearing loss --profound    Anemia,, continue to follow labs, next set April 11, 2024    Hypothyroidism, new diagnosis found TSH 39, lab work August 28, 2023 reviewed,, continues levothyroxine, 0.1 mg daily    Allergies, continues  Singulair 10 mg daily,    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, continues on nystatin cream and powder as needed    7.6 cm infrarenal abdominal aortic aneurysm nonoperable candidate, continue metoprolol extended release 50 mg  daily     Type 2 diabetes, off metformin monitoring clinically    Vitamin D deficiency continues replacement 1000 units daily    Acute kidney injury on top of chronic kidney disease, off sodium bicarb ,     Hyperlipidemia continues  simvastatin 20 mg daily    Volume issues ---------BR NP levels are low,  continue Lasix 40 mg daily, potassium 20 mEq daily,     Chandrakant Holcomb MD

## 2024-05-01 ENCOUNTER — NURSING HOME (OUTPATIENT)
Dept: INTERNAL MEDICINE | Age: 89
End: 2024-05-01
Payer: MEDICARE

## 2024-05-01 DIAGNOSIS — E03.8 HYPOTHYROIDISM DUE TO HASHIMOTO'S THYROIDITIS: ICD-10-CM

## 2024-05-01 DIAGNOSIS — I10 HYPERTENSION, ESSENTIAL: ICD-10-CM

## 2024-05-01 DIAGNOSIS — E06.3 HYPOTHYROIDISM DUE TO HASHIMOTO'S THYROIDITIS: ICD-10-CM

## 2024-05-01 DIAGNOSIS — E78.2 MIXED HYPERLIPIDEMIA: ICD-10-CM

## 2024-05-01 DIAGNOSIS — R53.1 GENERALIZED WEAKNESS: ICD-10-CM

## 2024-05-01 DIAGNOSIS — I71.43 INFRARENAL ABDOMINAL AORTIC ANEURYSM (AAA) WITHOUT RUPTURE: Primary | ICD-10-CM

## 2024-05-01 DIAGNOSIS — N18.31 STAGE 3A CHRONIC KIDNEY DISEASE: ICD-10-CM

## 2024-05-01 DIAGNOSIS — E11.65 TYPE 2 DIABETES MELLITUS WITH HYPERGLYCEMIA, WITHOUT LONG-TERM CURRENT USE OF INSULIN: ICD-10-CM

## 2024-05-01 DIAGNOSIS — R60.0 LOCALIZED EDEMA: ICD-10-CM

## 2024-05-01 PROCEDURE — 99309 SBSQ NF CARE MODERATE MDM 30: CPT | Performed by: INTERNAL MEDICINE

## 2024-05-01 NOTE — PROGRESS NOTES
Nursing Home Follow-Up Note      Chandrakant Holcomb MD  [x]  [address], [address]  [place] [address]  Phone: [phone]  Fax: [phone]     PATIENT NAME: Jayashree Longoria                                                                          YOB: 1929            DATE OF SERVICE: 05/01/2024  FACILITY:   [] [facility]   [x] [facility]  [] [facility]  [] Other      HISTORY OF PRESENT ILLNESS: Patient is a pleasant 89-year-old female who is sleeping comfortably when I walked in the room she arouses easily she is hard of hearing but very pleasant says she is doing well for her age, she follows commands well, she ask how I was doing,      PAST MEDICAL & SURGICAL HISTORY:   Past Medical History:   Diagnosis Date    Diabetes mellitus     Disease of thyroid gland     Hyperlipidemia     Hypertension     Renal disorder    Lower extremity venous insufficiency  Obesity  Hearing loss  No past surgical history on file.       MEDICATIONS:  I have reviewed and reconciled the patients medication list in the patients chart at the skilled nursing facility today.        PHYSICAL EXAMINATION:   VITAL SIGNS: 130/60 sat 96% on room air, pulse 76 respiratory rates around 18-20, she mouth breathes her mouth is dry    PHYSICAL EXAM: Her lungs are clear diminished breath sounds cardiac exam regular rhythm with a 3/6 systolic murmur she is alert and oriented x 2, her abdomen slightly obese soft nontender nondistended neck is supple skin is dry throughout she has marked redness of both lower legs from the mid PreTAB down to the ankles bilateral which is chronic slight warmth is noted no open areas, trace to 1+ edema is noted bilateral lower legs, she can move her feet to command      RECORDS REVIEW:   Orders Reviewed.  Labs Reviewed.      ICD-10-CM ICD-9-CM   1. Infrarenal abdominal aortic aneurysm (AAA) without rupture  I71.43 441.4   2.  Localized edema  R60.0 782.3   3. Hypertension, essential  I10 401.9   4. Type 2 diabetes mellitus with hyperglycemia, without long-term current use of insulin  E11.65 250.00     790.29   5. Generalized weakness  R53.1 780.79   6. Stage 3a chronic kidney disease  N18.31 585.3   7. Hypothyroidism due to Hashimoto's thyroiditis  E03.8 244.8    E06.3 245.2   8. Mixed hyperlipidemia  E78.2 272.2       ASSESSMENT/PLAN    Diagnoses and all orders for this visit:    1. Infrarenal abdominal aortic aneurysm (AAA) without rupture (Primary)    2. Localized edema    3. Hypertension, essential    4. Type 2 diabetes mellitus with hyperglycemia, without long-term current use of insulin    5. Generalized weakness    6. Stage 3a chronic kidney disease    7. Hypothyroidism due to Hashimoto's thyroiditis    8. Mixed hyperlipidemia    Fall at home,, continues long-term care now, continues getting up with restorative,    into the wheelchair on a regular basis,    Lower extremity edema redness, chronic issue, most likely chronic stasis dermatitis,,, continues Lasix 20 mg daily, potassium 20 mEq daily, triamcinolone cream, wound care to the lower leg, heel protectors,    Arterial insufficiency of the lower legs,     large a abdominal aortic aneurysm     Generalized weakness,, continues restorative getting up and down with assistance, stable    asthma, continues Symbicort 2 puffs twice a day, Singulair 10 mg daily,, Spiriva inhaler once daily    Hearing loss --profound    Anemia,, continue to follow labs, next set April 11, 2024    Hypothyroidism, new diagnosis found TSH 39, lab work August 28, 2023 reviewed,, continues levothyroxine, 0.125 mg daily    Allergies, continues  Singulair 10 mg daily,    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, continues on nystatin cream and powder as needed    7.6 cm infrarenal abdominal aortic aneurysm nonoperable candidate, continue metoprolol extended release 50 mg daily     Type 2 diabetes, off  metformin monitoring clinically    Vitamin D deficiency continues replacement 1000 units daily    Acute kidney injury on top of chronic kidney disease, off sodium bicarb ,     Hyperlipidemia continues  simvastatin 20 mg daily    Volume issues ---------BR NP levels are low,  continue Lasix 20 mg daily, potassium 20 mEq daily,       Chandrakant Holcomb MD

## 2024-06-03 ENCOUNTER — NURSING HOME (OUTPATIENT)
Dept: INTERNAL MEDICINE | Age: 89
End: 2024-06-03
Payer: MEDICARE

## 2024-06-03 DIAGNOSIS — R60.0 EDEMA OF BOTH LOWER LEGS DUE TO PERIPHERAL VENOUS INSUFFICIENCY: ICD-10-CM

## 2024-06-03 DIAGNOSIS — R53.1 GENERALIZED WEAKNESS: ICD-10-CM

## 2024-06-03 DIAGNOSIS — R60.0 LOCALIZED EDEMA: ICD-10-CM

## 2024-06-03 DIAGNOSIS — E78.2 MIXED HYPERLIPIDEMIA: ICD-10-CM

## 2024-06-03 DIAGNOSIS — E06.3 HYPOTHYROIDISM DUE TO HASHIMOTO'S THYROIDITIS: ICD-10-CM

## 2024-06-03 DIAGNOSIS — I87.2 EDEMA OF BOTH LOWER LEGS DUE TO PERIPHERAL VENOUS INSUFFICIENCY: ICD-10-CM

## 2024-06-03 DIAGNOSIS — I71.43 INFRARENAL ABDOMINAL AORTIC ANEURYSM (AAA) WITHOUT RUPTURE: Primary | ICD-10-CM

## 2024-06-03 DIAGNOSIS — E03.8 HYPOTHYROIDISM DUE TO HASHIMOTO'S THYROIDITIS: ICD-10-CM

## 2024-06-03 DIAGNOSIS — I10 HYPERTENSION, ESSENTIAL: ICD-10-CM

## 2024-06-03 PROCEDURE — 99309 SBSQ NF CARE MODERATE MDM 30: CPT | Performed by: INTERNAL MEDICINE

## 2024-06-03 NOTE — PROGRESS NOTES
Nursing Home Follow-Up Note      Chandrakant Holcomb MD  [x]  [address], [address]  [place] [address]  Phone: [phone]  Fax: [phone]     PATIENT NAME: Jayashree Longoria                                                                          YOB: 1929            DATE OF SERVICE: 06/03/2024  FACILITY:   [] [facility]   [x] [facility]  [] [facility]  [] Other      HISTORY OF PRESENT ILLNESS: Patient was seen today for routine rounds she is in good spirits she was asleep she arouses easily no complaints no distress no nursing reported issues      PAST MEDICAL & SURGICAL HISTORY:   Past Medical History:   Diagnosis Date    Diabetes mellitus     Disease of thyroid gland     Hyperlipidemia     Hypertension     Renal disorder    Chronic right upper extremity swelling  No past surgical history on file.       MEDICATIONS:  I have reviewed and reconciled the patients medication list in the patients chart at the skilled nursing facility today.        PHYSICAL EXAMINATION:   VITAL SIGNS: 128/70 sat 96% on room air pulse 72 respiratory rate 18 and temperature 97.4    PHYSICAL EXAM: Her neck is supple her skin is dry, she is awake alert pleasant talkative cardiac exam regular rhythm with a 2/6 systolic murmur abdomen is soft nontender her right arm is larger than her left, her lower legs show chronic redness stasis changes around the lower PreTAB regions bilaterally, there is trace to 1+ edema in that area, she can move her legs to command she moves her arms to command      RECORDS REVIEW:   Orders Reviewed.  Labs Reviewed.      ICD-10-CM ICD-9-CM   1. Infrarenal abdominal aortic aneurysm (AAA) without rupture  I71.43 441.4   2. Hypothyroidism due to Hashimoto's thyroiditis  E03.8 244.8    E06.3 245.2   3. Mixed hyperlipidemia  E78.2 272.2   4. Hypertension, essential  I10 401.9   5. Generalized weakness  R53.1 780.79   6.  Localized edema  R60.0 782.3   7. Edema of both lower legs due to peripheral venous insufficiency  I87.2 459.81    R60.0 782.3       ASSESSMENT/PLAN    Diagnoses and all orders for this visit:    1. Infrarenal abdominal aortic aneurysm (AAA) without rupture (Primary)    2. Hypothyroidism due to Hashimoto's thyroiditis    3. Mixed hyperlipidemia    4. Hypertension, essential    5. Generalized weakness    6. Localized edema    7. Edema of both lower legs due to peripheral venous insufficiency        Fall at home,, continues long-term care -- into the wheelchair on a regular basis,    Lower extremity edema /redness, chronic issue, most likely chronic stasis dermatitis,,, continues Lasix 20 mg daily, potassium 20 mEq daily, triamcinolone cream,     Arterial insufficiency of the lower legs,     large abdominal aortic aneurysm     Generalized weakness,,  up and down with assistance, stable    asthma, continues Symbicort 2 puffs twice a day, Singulair 10 mg daily,, Spiriva inhaler once daily    Hearing loss --profound    Anemia,, continue to follow labs, next set April 11, 2024    Hypothyroidism, new diagnosis found TSH 39, lab work August 28, 2023 reviewed,, continues levothyroxine, 0.137 mg daily    Allergies, continues  Singulair 10 mg daily,    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, continues on nystatin cream and powder as needed    7.6 cm infrarenal abdominal aortic aneurysm nonoperable candidate, continue metoprolol extended release 50 mg daily     Type 2 diabetes, off metformin monitoring clinically    Vitamin D deficiency continues replacement 1000 units daily    Acute kidney injury on top of chronic kidney disease, off sodium bicarb ,     Hyperlipidemia continues  simvastatin 20 mg daily    Volume issues ---------BR NP levels are low,  continue Lasix 20 mg daily, potassium 20 mEq daily,   Chandrakant Holcomb MD

## 2024-07-09 ENCOUNTER — NURSING HOME (OUTPATIENT)
Dept: INTERNAL MEDICINE | Age: 89
End: 2024-07-09
Payer: MEDICARE

## 2024-07-09 DIAGNOSIS — E06.3 HYPOTHYROIDISM DUE TO HASHIMOTO'S THYROIDITIS: ICD-10-CM

## 2024-07-09 DIAGNOSIS — E11.65 TYPE 2 DIABETES MELLITUS WITH HYPERGLYCEMIA, WITHOUT LONG-TERM CURRENT USE OF INSULIN: ICD-10-CM

## 2024-07-09 DIAGNOSIS — E78.2 MIXED HYPERLIPIDEMIA: ICD-10-CM

## 2024-07-09 DIAGNOSIS — I10 HYPERTENSION, ESSENTIAL: ICD-10-CM

## 2024-07-09 DIAGNOSIS — R60.0 EDEMA OF BOTH LOWER LEGS DUE TO PERIPHERAL VENOUS INSUFFICIENCY: Primary | ICD-10-CM

## 2024-07-09 DIAGNOSIS — N18.31 STAGE 3A CHRONIC KIDNEY DISEASE: ICD-10-CM

## 2024-07-09 DIAGNOSIS — E03.8 HYPOTHYROIDISM DUE TO HASHIMOTO'S THYROIDITIS: ICD-10-CM

## 2024-07-09 DIAGNOSIS — I71.43 INFRARENAL ABDOMINAL AORTIC ANEURYSM (AAA) WITHOUT RUPTURE: ICD-10-CM

## 2024-07-09 DIAGNOSIS — R53.1 GENERALIZED WEAKNESS: ICD-10-CM

## 2024-07-09 DIAGNOSIS — I87.2 EDEMA OF BOTH LOWER LEGS DUE TO PERIPHERAL VENOUS INSUFFICIENCY: Primary | ICD-10-CM

## 2024-07-09 PROCEDURE — 99309 SBSQ NF CARE MODERATE MDM 30: CPT | Performed by: INTERNAL MEDICINE

## 2024-07-09 NOTE — PROGRESS NOTES
Nursing Home Follow-Up Note      Chandrakant Holcomb MD  [x]  [address], [address]  [place] [address]  Phone: [phone]  Fax: [phone]     PATIENT NAME: Jayashree Longoria                                                                          YOB: 1929            DATE OF SERVICE: 07/09/2024  FACILITY:   [] [facility]   [x] [facility]  [] [facility]  [] Other      HISTORY OF PRESENT ILLNESS: Patient was seen today she is in good spirits she arouses easily the nurses report that she refuses to have her legs Ace wrapped or even elevated, she says she kicks the pillows out once they put them under her legs, she denies any pain in her legs she answers a few simple questions,      PAST MEDICAL & SURGICAL HISTORY:   Past Medical History:   Diagnosis Date    Diabetes mellitus     Disease of thyroid gland     Hyperlipidemia     Hypertension     Renal disorder    Obesity  Chronic lower extremity edema  Chronic stasis dermatitis and venous insufficiency  No past surgical history on file.       MEDICATIONS:  I have reviewed and reconciled the patients medication list in the patients chart at the skilled nursing facility today.        PHYSICAL EXAMINATION:   VITAL SIGNS: 125/75 sat 95% pulse of 84 respiratory rate 18 temperature 98.1 on July 3, 2024, weight 196.4 pounds    PHYSICAL EXAM: On exam she is pleasant she arouses she is confused at baseline she has chronic right upper extremity swelling, her neck is supple she has crackles at both lung bases laterally cardiac exam reveals a regular rhythm with a 3/6 systolic murmur heard over the entire precordium her lower legs show redness edema from the knees down to the ankles mainly in the mid PreTAB regions with a bandage over the left anterior lateral foot area, she does have edema in their feet, she follows simple commands      RECORDS REVIEW:   Orders Reviewed.  Labs  Reviewed.      ICD-10-CM ICD-9-CM   1. Edema of both lower legs due to peripheral venous insufficiency  I87.2 459.81    R60.0 782.3   2. Infrarenal abdominal aortic aneurysm (AAA) without rupture  I71.43 441.4   3. Generalized weakness  R53.1 780.79   4. Type 2 diabetes mellitus with hyperglycemia, without long-term current use of insulin  E11.65 250.00     790.29   5. Hypothyroidism due to Hashimoto's thyroiditis  E03.8 244.8    E06.3 245.2   6. Stage 3a chronic kidney disease  N18.31 585.3   7. Hypertension, essential  I10 401.9   8. Mixed hyperlipidemia  E78.2 272.2       ASSESSMENT/PLAN    Diagnoses and all orders for this visit:    1. Edema of both lower legs due to peripheral venous insufficiency (Primary)    2. Infrarenal abdominal aortic aneurysm (AAA) without rupture    3. Generalized weakness    4. Type 2 diabetes mellitus with hyperglycemia, without long-term current use of insulin    5. Hypothyroidism due to Hashimoto's thyroiditis    6. Stage 3a chronic kidney disease    7. Hypertension, essential    8. Mixed hyperlipidemia    Fall at home,, continues long-term care    Chronic lower extremity edema /redness, chronic issue, most likely chronic stasis dermatitis,,, continues Lasix 20 mg daily, potassium 20 mEq daily, triamcinolone cream,, consider restarting Ace wrap's, compression wraps, and increasing diuretics?  July 9, 2024    Arterial insufficiency of the lower legs,     large abdominal aortic aneurysm     Generalized weakness,,  up and down with assistance, stable    asthma, continues Symbicort 2 puffs twice a day, Singulair 10 mg daily    Hearing loss --profound    Anemia,,     Hypothyroidism, new diagnosis found TSH 39, lab work August 28, 2023, continues levothyroxine, 0.137 mg daily    Allergies, continues  Singulair 10 mg daily,    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, continues on nystatin cream and powder as needed    7.6 cm infrarenal abdominal aortic aneurysm nonoperable candidate,  continue metoprolol extended release 50 mg daily     Type 2 diabetes, off metformin monitoring clinically    Vitamin D deficiency continues replacement 1000 units daily    Acute kidney injury on top of chronic kidney disease, off sodium bicarb ,     Hyperlipidemia continues  simvastatin 20 mg daily    Volume issues ---------BR NP levels are low,  continue Lasix 20 mg daily, potassium 20 mEq daily,       Chandrakant Holcomb MD

## 2024-08-08 ENCOUNTER — TRANSCRIBE ORDERS (OUTPATIENT)
Dept: ADMINISTRATIVE | Facility: HOSPITAL | Age: 89
End: 2024-08-08
Payer: MEDICARE

## 2024-08-08 DIAGNOSIS — I73.9 PAD (PERIPHERAL ARTERY DISEASE): Primary | ICD-10-CM

## 2024-08-10 ENCOUNTER — NURSING HOME (OUTPATIENT)
Dept: INTERNAL MEDICINE | Age: 89
End: 2024-08-10
Payer: MEDICARE

## 2024-08-10 DIAGNOSIS — E06.3 HYPOTHYROIDISM DUE TO HASHIMOTO'S THYROIDITIS: ICD-10-CM

## 2024-08-10 DIAGNOSIS — I10 HYPERTENSION, ESSENTIAL: Primary | ICD-10-CM

## 2024-08-10 DIAGNOSIS — R60.0 EDEMA OF BOTH LOWER LEGS DUE TO PERIPHERAL VENOUS INSUFFICIENCY: ICD-10-CM

## 2024-08-10 DIAGNOSIS — N18.31 STAGE 3A CHRONIC KIDNEY DISEASE: ICD-10-CM

## 2024-08-10 DIAGNOSIS — E78.2 MIXED HYPERLIPIDEMIA: ICD-10-CM

## 2024-08-10 DIAGNOSIS — I71.43 INFRARENAL ABDOMINAL AORTIC ANEURYSM (AAA) WITHOUT RUPTURE: ICD-10-CM

## 2024-08-10 DIAGNOSIS — E11.65 TYPE 2 DIABETES MELLITUS WITH HYPERGLYCEMIA, WITHOUT LONG-TERM CURRENT USE OF INSULIN: ICD-10-CM

## 2024-08-10 DIAGNOSIS — E03.8 HYPOTHYROIDISM DUE TO HASHIMOTO'S THYROIDITIS: ICD-10-CM

## 2024-08-10 DIAGNOSIS — I87.2 EDEMA OF BOTH LOWER LEGS DUE TO PERIPHERAL VENOUS INSUFFICIENCY: ICD-10-CM

## 2024-08-10 DIAGNOSIS — R60.0 LOCALIZED EDEMA: ICD-10-CM

## 2024-08-10 PROCEDURE — 99309 SBSQ NF CARE MODERATE MDM 30: CPT | Performed by: INTERNAL MEDICINE

## 2024-08-10 NOTE — PROGRESS NOTES
Nursing Home Follow-Up Note      Chandrakant Holcomb MD  [x]  [address], [address]  [place] [address]  Phone: [phone]  Fax: [phone]     PATIENT NAME: Jayashree Longoria                                                                          YOB: 1929            DATE OF SERVICE: 08/10/2024  FACILITY:   [] [facility]   [x] [facility]  [] [facility]  [] Other      HISTORY OF PRESENT ILLNESS: Patient is being seen for regular rounds she is pleasant she was asleep she arouses easily she is cooperative, she is a little bit hard of hearing      PAST MEDICAL & SURGICAL HISTORY:   Past Medical History:   Diagnosis Date    Diabetes mellitus     Disease of thyroid gland     Hyperlipidemia     Hypertension     Renal disorder    Hearing loss  Immobilization  Chronic venous insufficiency lower legs  No past surgical history on file.       MEDICATIONS:  I have reviewed and reconciled the patients medication list in the patients chart at the skilled nursing facility today.        PHYSICAL EXAMINATION:   VITAL SIGNS: Temperature 97.4 respiratory rate 18 pulse 76 sat 95% on room air blood pressure 140/66, weight 196.8 pounds    PHYSICAL EXAM: Her lungs are clear anteriorly and laterally diminished breath sounds with a few crackles at both bases cardiac exam regular rhythm with a 3/6 systolic murmur holosystolic, abdomen is soft slightly obese nontender her lower legs show 1+ edema to 2+ with stasis changes and marked redness around the lower PreTAB regions bilateral which is unchanged from previous exams no ulcerations no open areas her feet are floated, she moves her arms to command her skin is very dry her mouth is dry, sclera nonicteric      RECORDS REVIEW:   Orders Reviewed.  Labs Reviewed.      ICD-10-CM ICD-9-CM   1. Hypertension, essential  I10 401.9   2. Localized edema  R60.0 782.3   3. Infrarenal abdominal aortic  aneurysm (AAA) without rupture  I71.43 441.4   4. Edema of both lower legs due to peripheral venous insufficiency  I87.2 459.81    R60.0 782.3   5. Mixed hyperlipidemia  E78.2 272.2   6. Hypothyroidism due to Hashimoto's thyroiditis  E03.8 244.8    E06.3 245.2   7. Stage 3a chronic kidney disease  N18.31 585.3   8. Type 2 diabetes mellitus with hyperglycemia, without long-term current use of insulin  E11.65 250.00     790.29       ASSESSMENT/PLAN    Diagnoses and all orders for this visit:    1. Hypertension, essential (Primary)    2. Localized edema    3. Infrarenal abdominal aortic aneurysm (AAA) without rupture    4. Edema of both lower legs due to peripheral venous insufficiency    5. Mixed hyperlipidemia    6. Hypothyroidism due to Hashimoto's thyroiditis    7. Stage 3a chronic kidney disease    8. Type 2 diabetes mellitus with hyperglycemia, without long-term current use of insulin        Fall at home,, continues long-term care    Chronic lower extremity edema /redness, chronic issue, most likely chronic stasis dermatitis,,, continues Lasix 20 mg daily, potassium 20 mEq daily, triamcinolone cream,, consider restarting Ace wrap's, compression wraps, and increasing diuretics?  July 9, 2024, nurses report patient does not want Ace wrap's in place as of August 2024    Arterial insufficiency of the lower legs,     Generalized weakness,,  up and down with assistance, stable    asthma, continues Symbicort 2 puffs twice a day, Singulair 10 mg daily Mucinex 600 twice a day    Hearing loss --profound    Anemia,,, will follow labs,    Hypothyroidism, new diagnosis found TSH 39, lab work August 28, 2023, continues levothyroxine, 0.137 mg daily continue following labs    Allergies, continues  Singulair 10 mg daily,, Claritin 10 mg daily    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, continues on nystatin cream and powder as needed    7.6 cm infrarenal abdominal aortic aneurysm nonoperable candidate, continue metoprolol  extended release 50 mg daily     Type 2 diabetes, off metformin monitoring clinically    Vitamin D deficiency continues replacement 1000 units daily    Acute kidney injury on top of chronic kidney disease, off sodium bicarb ,, continue following labs    Hyperlipidemia continues  simvastatin 20 mg daily    Volume issues ---------BR NP levels are low,  continue Lasix 20 mg daily, potassium 20 mEq daily,   Chandrakant Holcomb MD

## 2024-09-12 ENCOUNTER — NURSING HOME (OUTPATIENT)
Dept: INTERNAL MEDICINE | Age: 89
End: 2024-09-12
Payer: MEDICARE

## 2024-09-12 DIAGNOSIS — L97.329 LOWER LIMB ULCER, ANKLE, LEFT, WITH UNSPECIFIED SEVERITY: ICD-10-CM

## 2024-09-12 DIAGNOSIS — I10 HYPERTENSION, ESSENTIAL: ICD-10-CM

## 2024-09-12 DIAGNOSIS — M79.604 PAIN IN BOTH LOWER EXTREMITIES: ICD-10-CM

## 2024-09-12 DIAGNOSIS — E03.8 HYPOTHYROIDISM DUE TO HASHIMOTO'S THYROIDITIS: ICD-10-CM

## 2024-09-12 DIAGNOSIS — R60.0 LOCALIZED EDEMA: ICD-10-CM

## 2024-09-12 DIAGNOSIS — N18.31 STAGE 3A CHRONIC KIDNEY DISEASE: ICD-10-CM

## 2024-09-12 DIAGNOSIS — M79.605 PAIN IN BOTH LOWER EXTREMITIES: ICD-10-CM

## 2024-09-12 DIAGNOSIS — E06.3 HYPOTHYROIDISM DUE TO HASHIMOTO'S THYROIDITIS: ICD-10-CM

## 2024-09-12 DIAGNOSIS — R53.1 GENERALIZED WEAKNESS: ICD-10-CM

## 2024-09-12 DIAGNOSIS — J45.20 MILD INTERMITTENT ASTHMA, UNSPECIFIED WHETHER COMPLICATED: ICD-10-CM

## 2024-09-12 DIAGNOSIS — E78.2 MIXED HYPERLIPIDEMIA: ICD-10-CM

## 2024-09-12 DIAGNOSIS — I87.2 EDEMA OF BOTH LOWER LEGS DUE TO PERIPHERAL VENOUS INSUFFICIENCY: Primary | ICD-10-CM

## 2024-09-12 DIAGNOSIS — E11.65 TYPE 2 DIABETES MELLITUS WITH HYPERGLYCEMIA, WITHOUT LONG-TERM CURRENT USE OF INSULIN: ICD-10-CM

## 2024-09-12 DIAGNOSIS — R60.0 EDEMA OF BOTH LOWER LEGS DUE TO PERIPHERAL VENOUS INSUFFICIENCY: Primary | ICD-10-CM

## 2024-09-12 PROCEDURE — 99309 SBSQ NF CARE MODERATE MDM 30: CPT | Performed by: INTERNAL MEDICINE

## 2024-09-12 NOTE — PROGRESS NOTES
Nursing Home Follow-Up Note      Chandrakant Holcomb MD  [x]  [address], [address]  [place] [address]  Phone: [phone]  Fax: [phone]     PATIENT NAME: Jayashree Longoria                                                                          YOB: 1929            DATE OF SERVICE: 09/12/2024  FACILITY:   [] [facility]   [x] [facility]  [] [facility]  [] Other      HISTORY OF PRESENT ILLNESS: Patient was seen for routine rounds she says her ankle hurts her where she has a bandage over the lateral malleolus, she will not let them wrap her legs she says it hurts too bad, her legs stay red and swollen, she says she wants a cream to put on it to make it pain go away, she is in no distress she is pleasant she is very talkative she does not know where she is or what town she lives in currently she knows she is at a hospital of some sort      PAST MEDICAL & SURGICAL HISTORY:   Past Medical History:   Diagnosis Date    Diabetes mellitus     Disease of thyroid gland     Hyperlipidemia     Hypertension     Renal disorder    Lower extremity edema  Venous insufficiency stasis dermatitis  No past surgical history on file.       MEDICATIONS:  I have reviewed and reconciled the patients medication list in the patients chart at the skilled nursing facility today.        PHYSICAL EXAMINATION:   VITAL SIGNS: 114/65 sat 95% on room air pulse 76 respiratory rate 18 temperature 97.9    PHYSICAL EXAM: Her neck is supple she is got some fatty tissue her right upper extremity is markedly enlarged compared to the left with a little bit of thickened skin, she can raise it up and down but she does not use it as well as the left arm her abdomen is obese soft nontender cardiac exam reveals a regular rhythm with a 3/6 systolic murmur, she is got 1+ edema to 2+ in the lower legs bilaterally with marked redness circumferentially around the  lower legs, she is got an ulcer with a bandage over the lateral left ankle and malleolus, she is very talkative pleasant      RECORDS REVIEW:   Orders Reviewed.  Labs Reviewed.      ICD-10-CM ICD-9-CM   1. Edema of both lower legs due to peripheral venous insufficiency  I87.2 459.81    R60.0 782.3   2. Localized edema  R60.0 782.3   3. Lower limb ulcer, ankle, left, with unspecified severity  L97.329 707.13   4. Mixed hyperlipidemia  E78.2 272.2   5. Hypertension, essential  I10 401.9   6. Hypothyroidism due to Hashimoto's thyroiditis  E03.8 244.8    E06.3 245.2   7. Stage 3a chronic kidney disease  N18.31 585.3   8. Pain in both lower extremities  M79.604 729.5    M79.605    9. Generalized weakness  R53.1 780.79   10. Type 2 diabetes mellitus with hyperglycemia, without long-term current use of insulin  E11.65 250.00     790.29   11. Mild intermittent asthma, unspecified whether complicated  J45.20 493.90       ASSESSMENT/PLAN    Diagnoses and all orders for this visit:    1. Edema of both lower legs due to peripheral venous insufficiency (Primary)    2. Localized edema    3. Lower limb ulcer, ankle, left, with unspecified severity    4. Mixed hyperlipidemia    5. Hypertension, essential    6. Hypothyroidism due to Hashimoto's thyroiditis    7. Stage 3a chronic kidney disease    8. Pain in both lower extremities    9. Generalized weakness    10. Type 2 diabetes mellitus with hyperglycemia, without long-term current use of insulin    11. Mild intermittent asthma, unspecified whether complicated        Fall at home,, continues long-term care, maintaining stable September 12, 2024    Chronic lower extremity edema /redness, chronic issue, chronic stasis dermatitis,,, continues Lasix 20 mg daily, potassium 20 mEq daily, triamcinolone cream,, consider restarting Ace wrap's, compression wraps, and increasing diuretics?  July 9, 2024, nurses report patient does not want Ace wrap's in place as of August 2024, discussed with  patient again September 12, 2024    Left ankle ulcer continue topical treatment pressure relief measures    Arterial insufficiency of the lower legs,     Generalized weakness,,  up and down with assistance, stable    asthma, continues Symbicort 2 puffs twice a day, Singulair 10 mg daily Mucinex 600 twice a day    Hearing loss --profound    Anemia,,, will follow labs,    Hypothyroidism, new diagnosis found TSH 39, lab work August 28, 2023, continues levothyroxine, 0.137 mg daily continue following labs    Allergies, continues  Singulair 10 mg daily,, Claritin 10 mg daily    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, continues on nystatin cream and powder as needed    7.6 cm infrarenal abdominal aortic aneurysm nonoperable candidate, continue metoprolol extended release 50 mg daily, not helping with lower extremity issues    Type 2 diabetes, off metformin monitoring clinically    Vitamin D deficiency continues replacement 1000 units daily    Acute kidney injury on top of chronic kidney disease, off sodium bicarb ,, continue following labs    Hyperlipidemia continues  simvastatin 20 mg daily    Volume issues ---------BR NP levels are low,  continue Lasix 20 mg daily, potassium 20 mEq daily,   Chandrakant Holcomb MD

## 2024-10-10 ENCOUNTER — NURSING HOME (OUTPATIENT)
Dept: INTERNAL MEDICINE | Age: 89
End: 2024-10-10
Payer: MEDICARE

## 2024-10-10 DIAGNOSIS — R53.1 GENERALIZED WEAKNESS: ICD-10-CM

## 2024-10-10 DIAGNOSIS — M79.605 PAIN IN BOTH LOWER EXTREMITIES: ICD-10-CM

## 2024-10-10 DIAGNOSIS — L97.329 LOWER LIMB ULCER, ANKLE, LEFT, WITH UNSPECIFIED SEVERITY: Primary | ICD-10-CM

## 2024-10-10 DIAGNOSIS — M79.604 PAIN IN BOTH LOWER EXTREMITIES: ICD-10-CM

## 2024-10-10 DIAGNOSIS — N18.31 STAGE 3A CHRONIC KIDNEY DISEASE: ICD-10-CM

## 2024-10-10 DIAGNOSIS — I71.43 INFRARENAL ABDOMINAL AORTIC ANEURYSM (AAA) WITHOUT RUPTURE: ICD-10-CM

## 2024-10-10 DIAGNOSIS — E06.3 HYPOTHYROIDISM DUE TO HASHIMOTO'S THYROIDITIS: ICD-10-CM

## 2024-10-10 DIAGNOSIS — I10 HYPERTENSION, ESSENTIAL: ICD-10-CM

## 2024-10-10 DIAGNOSIS — J45.20 MILD INTERMITTENT ASTHMA, UNSPECIFIED WHETHER COMPLICATED: ICD-10-CM

## 2024-10-10 DIAGNOSIS — R60.0 LOCALIZED EDEMA: ICD-10-CM

## 2024-10-10 DIAGNOSIS — E78.2 MIXED HYPERLIPIDEMIA: ICD-10-CM

## 2024-10-10 DIAGNOSIS — E11.65 TYPE 2 DIABETES MELLITUS WITH HYPERGLYCEMIA, WITHOUT LONG-TERM CURRENT USE OF INSULIN: ICD-10-CM

## 2024-10-10 PROCEDURE — 99309 SBSQ NF CARE MODERATE MDM 30: CPT | Performed by: INTERNAL MEDICINE

## 2024-10-10 NOTE — PROGRESS NOTES
Nursing Home Follow-Up Note      Chandrakant Holcomb MD  [x]  [address], [address]  [place] [address]  Phone: [phone]  Fax: [phone]     PATIENT NAME: Jayashree Longoria                                                                          YOB: 1929            DATE OF SERVICE: 10/10/2024  FACILITY:   [] [facility]   [x] [facility]  [] [facility]  [] Other      HISTORY OF PRESENT ILLNESS: Patient is being seen for routine rounds she is in good spirits no real complaints or distress she is pleasant she follows commands she says the only problem she has is her left ankle wound and I believe it is getting better the nurses report they are just putting a topical ointment on it right now she is moving the leg fairly well to command she gets up I believe at least daily, she is watching TV she can control the TV well with her remote, and she is pleasant answering questions fairly well      PAST MEDICAL & SURGICAL HISTORY:   Past Medical History:   Diagnosis Date    Diabetes mellitus     Disease of thyroid gland     Hyperlipidemia     Hypertension     Renal disorder       No past surgical history on file.       MEDICATIONS:  I have reviewed and reconciled the patients medication list in the patients chart at the skilled nursing facility today.        PHYSICAL EXAMINATION:   VITAL SIGNS: 140/76 pulse 70 temperature 97.9 sat 97% on room air, respiratory rate 18    PHYSICAL EXAM: On exam her cardiac exam reveals a regular rhythm with a 3/6 systolic murmur heard at the left upper and right upper sternal borders, her abdomen is obese soft nontender her lungs are clear laterally and anteriorly her neck is supple with some fatty tissue her skin is dry she has chronic right upper extremity enlargement edema compared to the left from chronic venous insufficiency due to her previous breast cancer and surgery, her lower legs  show redness around the lower legs mid PreTAB down to the ankles bilateral this is chronic and is unchanged from previous exams are still some swelling there about 1+ bilateral, she is got a small punctate ulcer on the left lateral malleolus it is got cream applied to it and she can move that leg fairly well to command the skin is tight around the ankles and feet bilateral      RECORDS REVIEW:   Orders Reviewed.  Labs Reviewed.      ICD-10-CM ICD-9-CM   1. Lower limb ulcer, ankle, left, with unspecified severity  L97.329 707.13   2. Generalized weakness  R53.1 780.79   3. Localized edema  R60.0 782.3   4. Infrarenal abdominal aortic aneurysm (AAA) without rupture  I71.43 441.4   5. Pain in both lower extremities  M79.604 729.5    M79.605    6. Stage 3a chronic kidney disease  N18.31 585.3   7. Hypothyroidism due to Hashimoto's thyroiditis  E06.3 245.2   8. Hypertension, essential  I10 401.9   9. Type 2 diabetes mellitus with hyperglycemia, without long-term current use of insulin  E11.65 250.00     790.29   10. Mild intermittent asthma, unspecified whether complicated  J45.20 493.90   11. Mixed hyperlipidemia  E78.2 272.2       ASSESSMENT/PLAN    Diagnoses and all orders for this visit:    1. Lower limb ulcer, ankle, left, with unspecified severity (Primary)    2. Generalized weakness    3. Localized edema    4. Infrarenal abdominal aortic aneurysm (AAA) without rupture    5. Pain in both lower extremities    6. Stage 3a chronic kidney disease    7. Hypothyroidism due to Hashimoto's thyroiditis    8. Hypertension, essential    9. Type 2 diabetes mellitus with hyperglycemia, without long-term current use of insulin    10. Mild intermittent asthma, unspecified whether complicated    11. Mixed hyperlipidemia        Fall at home,, continues long-term care, maintaining October 10, 2024    Chronic lower extremity edema /redness, chronic issue, chronic stasis dermatitis,,, continues Lasix 20 mg daily, potassium 20 mEq  daily, triamcinolone cream,,     Left ankle ulcer continue topical treatment pressure relief measures    Arterial insufficiency of the lower legs,, continues aspirin, Pletal 50 mg daily,, simvastatin 20 mg daily    Generalized weakness,,  up and down with assistance, stable    asthma, continues DuoNebs 4 times a day, Symbicort 2 puffs twice a day, Singulair 10 mg daily Mucinex 600 twice a day    Hearing loss --profound    Anemia,,, will follow labs,    Hypothyroidism, new diagnosis found TSH 39, lab work August 28, 2023, continues levothyroxine, 0.137 mg daily continue following labs    Allergies, continues  Singulair 10 mg daily,, Claritin 10 mg daily    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, continues on nystatin cream and powder as needed    7.6 cm infrarenal abdominal aortic aneurysm nonoperable candidate, continue metoprolol extended release 50 mg daily, not helping with lower extremity issues    Type 2 diabetes, off metformin monitoring clinically    Vitamin D deficiency continues replacement 1000 units daily    Acute kidney injury on top of chronic kidney disease, off sodium bicarb ,, continue following labs    Hyperlipidemia continues  simvastatin 20 mg daily    Volume issues ---------BR NP levels are low,  continue Lasix 20 mg daily, potassium 20 mEq daily,     Chandrakant Holcomb MD

## 2024-11-11 ENCOUNTER — NURSING HOME (OUTPATIENT)
Dept: INTERNAL MEDICINE | Age: 89
End: 2024-11-11
Payer: MEDICARE

## 2024-11-11 DIAGNOSIS — I71.43 INFRARENAL ABDOMINAL AORTIC ANEURYSM (AAA) WITHOUT RUPTURE: ICD-10-CM

## 2024-11-11 DIAGNOSIS — J45.20 MILD INTERMITTENT ASTHMA, UNSPECIFIED WHETHER COMPLICATED: ICD-10-CM

## 2024-11-11 DIAGNOSIS — R53.1 GENERALIZED WEAKNESS: ICD-10-CM

## 2024-11-11 DIAGNOSIS — R60.0 EDEMA OF BOTH LOWER LEGS DUE TO PERIPHERAL VENOUS INSUFFICIENCY: Primary | ICD-10-CM

## 2024-11-11 DIAGNOSIS — L97.329 LOWER LIMB ULCER, ANKLE, LEFT, WITH UNSPECIFIED SEVERITY: ICD-10-CM

## 2024-11-11 DIAGNOSIS — N18.31 STAGE 3A CHRONIC KIDNEY DISEASE: ICD-10-CM

## 2024-11-11 DIAGNOSIS — I87.2 EDEMA OF BOTH LOWER LEGS DUE TO PERIPHERAL VENOUS INSUFFICIENCY: Primary | ICD-10-CM

## 2024-11-11 DIAGNOSIS — E11.22 TYPE 2 DIABETES MELLITUS WITH STAGE 3A CHRONIC KIDNEY DISEASE, WITHOUT LONG-TERM CURRENT USE OF INSULIN: ICD-10-CM

## 2024-11-11 DIAGNOSIS — I73.9 ARTERIAL INSUFFICIENCY OF LOWER EXTREMITY: ICD-10-CM

## 2024-11-11 DIAGNOSIS — E78.2 MIXED HYPERLIPIDEMIA: ICD-10-CM

## 2024-11-11 DIAGNOSIS — E06.3 HYPOTHYROIDISM DUE TO HASHIMOTO'S THYROIDITIS: ICD-10-CM

## 2024-11-11 DIAGNOSIS — I10 HYPERTENSION, ESSENTIAL: ICD-10-CM

## 2024-11-11 DIAGNOSIS — N18.31 TYPE 2 DIABETES MELLITUS WITH STAGE 3A CHRONIC KIDNEY DISEASE, WITHOUT LONG-TERM CURRENT USE OF INSULIN: ICD-10-CM

## 2024-11-11 PROCEDURE — 99309 SBSQ NF CARE MODERATE MDM 30: CPT | Performed by: INTERNAL MEDICINE

## 2024-11-11 NOTE — PROGRESS NOTES
Nursing Home Follow-Up Note      Chandrakant Holcomb MD  [x]  [address], [address]  [place] [address]  Phone: [phone]  Fax: [phone]     PATIENT NAME: Jayashree Longoria                                                                          YOB: 1929            DATE OF SERVICE: 11/11/2024  FACILITY:   [] [facility]   [x] [facility]  [] [facility]  [] Other      HISTORY OF PRESENT ILLNESS: Patient was seen today for routine rounds she is in good spirits she arouses easily she still very hard of hearing which is unchanged, she says she gets up every day in the wheelchair, she says she would let us wrap her legs if we wanted to keep the swelling and redness down, but the nurses report that she refuses most of the time      PAST MEDICAL & SURGICAL HISTORY:   Past Medical History:   Diagnosis Date    Diabetes mellitus     Disease of thyroid gland     Hyperlipidemia     Hypertension     Renal disorder       No past surgical history on file.       MEDICATIONS:  I have reviewed and reconciled the patients medication list in the patients chart at the skilled nursing facility today.        PHYSICAL EXAMINATION:   VITAL SIGNS: Respiratory rate 18 temperature 97.2 pulse 91, weight is 194.2 pounds, last blood pressure was 146/72    PHYSICAL EXAM: Her neck is supple with fatty tissue she is alert pleasant very hard of hearing her sclera nonicteric her lungs are clear laterally and anteriorly cardiac exam reveals a regular rhythm with a 3/6 systolic murmur she can move her arms to command her abdomen is obese soft her lower legs show 2-3+ edema from the knees to the ankles and feet with marked redness around the circumferential areas of both lower extremities, no open areas are noted she has a bandage over the left lateral malleolus which is present due to a chronic small ulcer, she is very talkative alert  pleasant      RECORDS REVIEW:   Orders Reviewed.  Labs Reviewed.      ICD-10-CM ICD-9-CM   1. Edema of both lower legs due to peripheral venous insufficiency  I87.2 459.81    R60.0 782.3   2. Generalized weakness  R53.1 780.79   3. Lower limb ulcer, ankle, left, with unspecified severity  L97.329 707.13   4. Mixed hyperlipidemia  E78.2 272.2   5. Hypertension, essential  I10 401.9   6. Hypothyroidism due to Hashimoto's thyroiditis  E06.3 245.2   7. Type 2 diabetes mellitus with stage 3a chronic kidney disease, without long-term current use of insulin  E11.22 250.40    N18.31 585.3   8. Stage 3a chronic kidney disease  N18.31 585.3   9. Mild intermittent asthma, unspecified whether complicated  J45.20 493.90   10. Infrarenal abdominal aortic aneurysm (AAA) without rupture  I71.43 441.4   11. Arterial insufficiency of lower extremity  I73.9 443.9       ASSESSMENT/PLAN    Diagnoses and all orders for this visit:    1. Edema of both lower legs due to peripheral venous insufficiency (Primary)    2. Generalized weakness    3. Lower limb ulcer, ankle, left, with unspecified severity    4. Mixed hyperlipidemia    5. Hypertension, essential    6. Hypothyroidism due to Hashimoto's thyroiditis    7. Type 2 diabetes mellitus with stage 3a chronic kidney disease, without long-term current use of insulin    8. Stage 3a chronic kidney disease    9. Mild intermittent asthma, unspecified whether complicated    10. Infrarenal abdominal aortic aneurysm (AAA) without rupture    11. Arterial insufficiency of lower extremity        Fall at home,, continues long-term care,, maintains November 11, 2024    Chronic lower extremity edema /redness, chronic issue, chronic stasis dermatitis,,, continues Lasix 20 mg daily, potassium 20 mEq daily, triamcinolone cream,,, discussed with nursing staff and patient about wrapping legs if she would let us November 11, 2024    Left ankle ulcer continue topical treatment pressure relief  measures    Arterial insufficiency of the lower legs,, continues aspirin, Pletal 50 mg daily,, simvastatin 20 mg daily    Generalized weakness,,  up and down with assistance, stable    asthma, continues DuoNebs 4 times a day, Symbicort 2 puffs twice a day, Singulair 10 mg daily Mucinex 600 twice a day    Hearing loss --profound    Anemia,,, will follow labs,    Hypothyroidism, new diagnosis found TSH 39, lab work August 28, 2023, continues levothyroxine, 0.137 mg daily    Allergies, continues  Singulair 10 mg daily,, Claritin 10 mg daily    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, continues on nystatin cream and powder as needed    7.6 cm infrarenal abdominal aortic aneurysm nonoperable candidate, continue metoprolol extended release 50 mg daily, not helping with lower extremity issues    Type 2 diabetes, off metformin monitoring clinically    Vitamin D deficiency continues replacement 1000 units daily    chronic kidney disease, off sodium bicarb ,, continue following labs    Hyperlipidemia continues  simvastatin 20 mg daily    Volume issues ---------BR NP levels are low,  continue Lasix 20 mg twice a day potassium 20 mEq daily,, will need to follow labs with change in diuretics October November 2024    Chandrakant Holcomb MD

## 2024-12-10 ENCOUNTER — NURSING HOME (OUTPATIENT)
Dept: INTERNAL MEDICINE | Age: 89
End: 2024-12-10
Payer: MEDICARE

## 2024-12-10 DIAGNOSIS — J45.20 MILD INTERMITTENT ASTHMA, UNSPECIFIED WHETHER COMPLICATED: ICD-10-CM

## 2024-12-10 DIAGNOSIS — N18.31 STAGE 3A CHRONIC KIDNEY DISEASE: ICD-10-CM

## 2024-12-10 DIAGNOSIS — E06.3 HYPOTHYROIDISM DUE TO HASHIMOTO'S THYROIDITIS: ICD-10-CM

## 2024-12-10 DIAGNOSIS — E78.2 MIXED HYPERLIPIDEMIA: ICD-10-CM

## 2024-12-10 DIAGNOSIS — E11.65 TYPE 2 DIABETES MELLITUS WITH HYPERGLYCEMIA, WITHOUT LONG-TERM CURRENT USE OF INSULIN: ICD-10-CM

## 2024-12-10 DIAGNOSIS — R53.1 GENERALIZED WEAKNESS: ICD-10-CM

## 2024-12-10 DIAGNOSIS — I71.43 INFRARENAL ABDOMINAL AORTIC ANEURYSM (AAA) WITHOUT RUPTURE: ICD-10-CM

## 2024-12-10 DIAGNOSIS — I10 HYPERTENSION, ESSENTIAL: ICD-10-CM

## 2024-12-10 DIAGNOSIS — L97.329 LOWER LIMB ULCER, ANKLE, LEFT, WITH UNSPECIFIED SEVERITY: ICD-10-CM

## 2024-12-10 DIAGNOSIS — I87.2 EDEMA OF BOTH LOWER LEGS DUE TO PERIPHERAL VENOUS INSUFFICIENCY: Primary | ICD-10-CM

## 2024-12-10 DIAGNOSIS — R60.0 EDEMA OF BOTH LOWER LEGS DUE TO PERIPHERAL VENOUS INSUFFICIENCY: Primary | ICD-10-CM

## 2024-12-10 PROCEDURE — 99309 SBSQ NF CARE MODERATE MDM 30: CPT | Performed by: INTERNAL MEDICINE

## 2024-12-10 NOTE — PROGRESS NOTES
Nursing Home Follow-Up Note      Chandrakant Holcomb MD  [x]  [address], [address]  [place] [address]  Phone: [phone]  Fax: [phone]     PATIENT NAME: Jayashree Longoria                                                                          YOB: 1929            DATE OF SERVICE: 12/10/2024  FACILITY:   [] [facility]   [x] [facility]  [] [facility]  [] Other      HISTORY OF PRESENT ILLNESS: Patient was seen today for routine rash that she is doing well she tells me she had a birthday and she showed me all the cards that were stuck on the walls and her dresser, and she did have quite a few, she smiles really big when she talks about it, she tells me she is 89 years old.  Very pleasant talkative cooperative, no distress      PAST MEDICAL & SURGICAL HISTORY:   Past Medical History:   Diagnosis Date    Diabetes mellitus     Disease of thyroid gland     Hyperlipidemia     Hypertension     Renal disorder    Lymphedema right upper extremity  Breast cancer by history  Immobilization  Obesity  Stasis dermatitis lower extremities chronic venous insufficiency  No past surgical history on file.       MEDICATIONS:  I have reviewed and reconciled the patients medication list in the patients chart at the skilled nursing facility today.        PHYSICAL EXAMINATION:   VITAL SIGNS: Sat 94% on room air respiratory rate 18 weight is 200 pounds, afebrile,    PHYSICAL EXAM: Lungs are clear with a few crackles at both bases, cardiac exam reveals a regular rhythm she is got some skin lesions where she has picked on her left thumb forearm and left hand, they have dried blood, she is got marked redness to the lower legs bilaterally from the proximal PreTAB regions to the ankles with some warmth noted which is chronic to her, there is no open areas, there is edema to the lower legs in the PreTAB regions bilaterally of 2+, her abdomen is  obese soft nontender she is got chronic swelling to the right upper extremity, her neck is supple her skin is dry with poor turgor but she is very talkative and alert she moves all 4 extremities to command her feet are partially elevated      RECORDS REVIEW:   Orders Reviewed.  Labs Reviewed.      ICD-10-CM ICD-9-CM   1. Edema of both lower legs due to peripheral venous insufficiency  I87.2 459.81    R60.0 782.3   2. Generalized weakness  R53.1 780.79   3. Lower limb ulcer, ankle, left, with unspecified severity  L97.329 707.13   4. Mixed hyperlipidemia  E78.2 272.2   5. Hypertension, essential  I10 401.9   6. Hypothyroidism due to Hashimoto's thyroiditis  E06.3 245.2   7. Mild intermittent asthma, unspecified whether complicated  J45.20 493.90   8. Infrarenal abdominal aortic aneurysm (AAA) without rupture  I71.43 441.4   9. Stage 3a chronic kidney disease  N18.31 585.3   10. Type 2 diabetes mellitus with hyperglycemia, without long-term current use of insulin  E11.65 250.00     790.29       ASSESSMENT/PLAN    Diagnoses and all orders for this visit:    1. Edema of both lower legs due to peripheral venous insufficiency (Primary)    2. Generalized weakness    3. Lower limb ulcer, ankle, left, with unspecified severity    4. Mixed hyperlipidemia    5. Hypertension, essential    6. Hypothyroidism due to Hashimoto's thyroiditis    7. Mild intermittent asthma, unspecified whether complicated    8. Infrarenal abdominal aortic aneurysm (AAA) without rupture    9. Stage 3a chronic kidney disease    10. Type 2 diabetes mellitus with hyperglycemia, without long-term current use of insulin        Fall at home,, continues long-term care,,     Chronic lower extremity edema /redness, chronic issue, chronic stasis dermatitis,,, continues Lasix 20 mg daily, potassium 20 mEq daily, triamcinolone cream,,    Left ankle ulcer continue topical treatment pressure relief measures    Arterial insufficiency of the lower legs,, continues  aspirin, Pletal 50 mg daily,, simvastatin 20 mg daily    Generalized weakness,,  up and down with assistance, stable    asthma, continues DuoNebs 4 times a day, Symbicort 2 puffs twice a day, Mucinex 600 twice a day, Claritin 10 mg daily    Hearing loss --profound    Anemia,,, will follow labs,    Hypothyroidism, new diagnosis found TSH 39, lab work August 28, 2023, continues levothyroxine, 0.137 mg daily    Allergies, continues  Claritin 10 mg daily    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

## 2025-01-02 ENCOUNTER — OFFICE VISIT (OUTPATIENT)
Dept: PULMONOLOGY | Facility: CLINIC | Age: OVER 89
End: 2025-01-02
Payer: MEDICARE

## 2025-01-02 VITALS
BODY MASS INDEX: 33.32 KG/M2 | OXYGEN SATURATION: 94 % | WEIGHT: 200 LBS | DIASTOLIC BLOOD PRESSURE: 57 MMHG | HEART RATE: 86 BPM | TEMPERATURE: 98.2 F | SYSTOLIC BLOOD PRESSURE: 129 MMHG | RESPIRATION RATE: 18 BRPM | HEIGHT: 65 IN

## 2025-01-02 DIAGNOSIS — J96.11 CHRONIC RESPIRATORY FAILURE WITH HYPOXIA: Chronic | ICD-10-CM

## 2025-01-02 DIAGNOSIS — J45.20 MILD INTERMITTENT ASTHMA, UNSPECIFIED WHETHER COMPLICATED: Primary | Chronic | ICD-10-CM

## 2025-01-02 DIAGNOSIS — R06.09 DYSPNEA ON EXERTION: ICD-10-CM

## 2025-01-02 RX ORDER — NYSTATIN 100000 [USP'U]/G
POWDER TOPICAL 4 TIMES DAILY
COMMUNITY

## 2025-01-02 RX ORDER — BUDESONIDE AND FORMOTEROL FUMARATE DIHYDRATE 160; 4.5 UG/1; UG/1
2 AEROSOL RESPIRATORY (INHALATION)
COMMUNITY

## 2025-01-02 RX ORDER — ASPIRIN 81 MG/1
81 TABLET ORAL DAILY
COMMUNITY

## 2025-01-02 RX ORDER — LEVOTHYROXINE SODIUM 50 UG/1
1 CAPSULE ORAL DAILY
COMMUNITY

## 2025-01-02 RX ORDER — METOPROLOL TARTRATE 50 MG
50 TABLET ORAL 2 TIMES DAILY
COMMUNITY

## 2025-01-02 RX ORDER — IPRATROPIUM BROMIDE AND ALBUTEROL SULFATE 2.5; .5 MG/3ML; MG/3ML
3 SOLUTION RESPIRATORY (INHALATION) EVERY 4 HOURS PRN
COMMUNITY

## 2025-01-02 RX ORDER — PRAVASTATIN SODIUM 20 MG
1 TABLET ORAL DAILY
COMMUNITY

## 2025-01-02 RX ORDER — CILOSTAZOL 50 MG/1
50 TABLET ORAL 2 TIMES DAILY
COMMUNITY

## 2025-01-02 RX ORDER — GUAIFENESIN 600 MG/1
1200 TABLET, EXTENDED RELEASE ORAL 2 TIMES DAILY
COMMUNITY

## 2025-01-02 RX ORDER — POTASSIUM CHLORIDE 1500 MG/1
20 TABLET, EXTENDED RELEASE ORAL DAILY
COMMUNITY

## 2025-01-02 RX ORDER — SENNOSIDES 8.6 MG
650 CAPSULE ORAL EVERY 8 HOURS PRN
COMMUNITY

## 2025-01-02 RX ORDER — LORATADINE 10 MG/1
10 TABLET ORAL DAILY
COMMUNITY
Start: 2024-10-22

## 2025-01-02 NOTE — PROGRESS NOTES
Primary Care Provider  Chandrakant Holcomb MD     Referring Provider  Chandrakant Holcomb, *     Chief Complaint  Establish Care and Emphysema (oxygen)    Subjective          Jayashree Longoria presents to [facility] PULMONARY & CRITICAL CARE MEDICINE  History of Present Illness  Jayashree Longoria is a 89 y.o. female patient here to establish care for COPD, emphysema and chronic respiratory failure.    Patient currently resides at [facility].  She states that she does have some shortness of breath with exertion and occasional wheezing.  She states that she feels like she has a cold and is not usually congested.  She has a cough that is mostly nonproductive.  She does have a history of asthma and is on Symbicort along with as needed DuoNebs, Mucinex and Claritin.  She is a never smoker.  She is currently on 2 L of oxygen and is benefiting from its use.  I have reviewed previous CT scans and chest x-rays that do not make any mention of emphysema.  Patient is a never smoker.  After discussing the testing processes containing a chest CT and a pulmonary function test, patient declines at this time.  Overall, she states she thinks she is doing very well for her age.     Her history of smoking is   Tobacco Use: Low Risk  (1/2/2025)    Patient History     Smoking Tobacco Use: Never     Smokeless Tobacco Use: Never     Passive Exposure: Not on file   .    Review of Systems   Constitutional:  Negative for chills, fatigue, fever, unexpected weight gain and unexpected weight loss.   HENT:  Congestion: Nasal.    Respiratory:  Positive for shortness of breath. Negative for apnea, cough and wheezing.         Negative for Hemoptysis     Cardiovascular:  Negative for chest pain, palpitations and leg swelling.   Skin:         Negative for cyanosis      Sleep: Negative for Excessive daytime sleepiness  Negative for morning headaches  Negative for Snoring    History reviewed. No pertinent  family history.     Social History     Socioeconomic History    Marital status:    Tobacco Use    Smoking status: Never    Smokeless tobacco: Never   Vaping Use    Vaping status: Never Used   Substance and Sexual Activity    Alcohol use: Never    Drug use: Never    Sexual activity: Not Currently        Past Medical History:   Diagnosis Date    Diabetes mellitus     Disease of thyroid gland     Hyperlipidemia     Hypertension     Renal disorder         Immunization History   Administered Date(s) Administered    COVID-19 (MODERNA) 1st,2nd,3rd Dose Monovalent 02/22/2021, 03/22/2021    COVID-19 (MODERNA) Monovalent Original Booster 10/23/2021, 04/20/2022    Fluzone High-Dose 65+YRS 09/12/2012, 10/16/2013, 09/12/2018    Fluzone High-Dose 65+yrs 09/15/2020, 12/02/2020    Influenza, Unspecified 12/06/2019    Pneumococcal Conjugate 13-Valent (PCV13) 09/12/2018    Pneumococcal Polysaccharide (PPSV23) 02/06/2020         No Known Allergies       Current Outpatient Medications:     acetaminophen (TYLENOL) 650 MG 8 hr tablet, Take 1 tablet by mouth Every 8 (Eight) Hours As Needed for Mild Pain., Disp: , Rfl:     ascorbic acid (VITAMIN C) 1000 MG tablet, Take 1 tablet by mouth Daily., Disp: , Rfl:     aspirin 81 MG EC tablet, Take 1 tablet by mouth Daily., Disp: , Rfl:     budesonide-formoterol (SYMBICORT) 160-4.5 MCG/ACT inhaler, Inhale 2 puffs 2 (Two) Times a Day., Disp: , Rfl:     cilostazol (PLETAL) 50 MG tablet, Take 1 tablet by mouth 2 (Two) Times a Day., Disp: , Rfl:     furosemide (LASIX) 40 MG tablet, Take 1 tablet by mouth Daily., Disp: , Rfl:     guaiFENesin (MUCINEX) 600 MG 12 hr tablet, Take 2 tablets by mouth 2 (Two) Times a Day., Disp: , Rfl:     ipratropium-albuterol (DUO-NEB) 0.5-2.5 mg/3 ml nebulizer, Take 3 mL by nebulization Every 4 (Four) Hours As Needed for Wheezing., Disp: , Rfl:     loratadine (CLARITIN) 10 MG tablet, Take 1 tablet by mouth Daily., Disp: , Rfl:     metFORMIN (GLUCOPHAGE) 500 MG  tablet, Take 1 tablet by mouth 2 (Two) Times a Day With Meals., Disp: , Rfl:     metoprolol tartrate (LOPRESSOR) 50 MG tablet, Take 1 tablet by mouth 2 (Two) Times a Day., Disp: , Rfl:     nystatin (MYCOSTATIN) 178094 UNIT/GM powder, Apply  topically to the appropriate area as directed 4 (Four) Times a Day., Disp: , Rfl:     potassium chloride (K-DUR,KLOR-CON) 20 MEQ CR tablet, Take 1 tablet by mouth Daily., Disp: , Rfl:     simvastatin (ZOCOR) 20 MG tablet, Take 1 tablet by mouth Every Night., Disp: , Rfl:     Synthroid 75 MCG tablet, Take 1 tablet by mouth Daily., Disp: , Rfl:     atenolol (TENORMIN) 50 MG tablet, Take 1 tablet by mouth Daily. (Patient not taking: Reported on 1/2/2025), Disp: , Rfl:     levothyroxine sodium (TIROSINT) 50 MCG capsule, Take 1 tablet by mouth Daily. (Patient not taking: Reported on 1/2/2025), Disp: , Rfl:     miconazole (MICOTIN) 2 % powder, Apply  topically to the appropriate area as directed Every 12 (Twelve) Hours. (Patient not taking: Reported on 1/2/2025), Disp: , Rfl:     montelukast (SINGULAIR) 10 MG tablet, Take 1 tablet by mouth Every Night. (Patient not taking: Reported on 1/2/2025), Disp: , Rfl:     potassium chloride ER (K-TAB) 20 MEQ tablet controlled-release ER tablet, Take 1 tablet by mouth Daily. (Patient not taking: Reported on 1/2/2025), Disp: , Rfl:     pravastatin (PRAVACHOL) 20 MG tablet, Take 1 tablet by mouth Daily. (Patient not taking: Reported on 1/2/2025), Disp: , Rfl:     sodium bicarbonate 650 MG tablet, Take 1 tablet by mouth 3 (Three) Times a Day. (Patient not taking: Reported on 1/2/2025), Disp: , Rfl:      Objective   Physical Exam  Constitutional:       General: She is not in acute distress.     Appearance: Normal appearance. She is normal weight.   HENT:      Right Ear: Hearing normal.      Left Ear: Hearing normal.      Nose: No nasal tenderness or congestion.      Mouth/Throat:      Mouth: Mucous membranes are moist. No oral lesions.   Eyes:       "Extraocular Movements: Extraocular movements intact.      Pupils: Pupils are equal, round, and reactive to light.   Cardiovascular:      Rate and Rhythm: Normal rate and regular rhythm.      Pulses: Normal pulses.      Heart sounds: Normal heart sounds. No murmur heard.  Pulmonary:      Effort: Pulmonary effort is normal.      Breath sounds: Decreased breath sounds present. No wheezing, rhonchi or rales.      Comments: Patient is on 2 L of oxygen.  She is able speak full sentences without difficulty.  Musculoskeletal:      Right lower leg: No edema.      Left lower leg: No edema.   Skin:     General: Skin is warm and dry.      Findings: No lesion or rash.   Neurological:      General: No focal deficit present.      Mental Status: She is alert and oriented to person, place, and time.   Psychiatric:         Mood and Affect: Affect normal. Mood is not anxious or depressed.         Vital Signs:   /57 (BP Location: Left arm, Patient Position: Sitting, Cuff Size: Adult)   Pulse 86   Temp 98.2 °F (36.8 °C) (Tympanic)   Resp 18   Ht 165.1 cm (65\")   Wt 90.7 kg (200 lb)   SpO2 94% Comment: 2 liters nc  BMI 33.28 kg/m²        Result Review :   The following data was reviewed by: SAUL Harris on 01/02/2025:    Data reviewed : Radiologic studies chest CT 9/19/2018, PET scan 10/16/2018, chest x-ray 8/11/2023 and nursing home note 12/10/2024    Procedures        Assessment and Plan    Diagnoses and all orders for this visit:    1. Mild intermittent asthma, unspecified whether complicated (Primary)  -     Alpha - 1 - Antitrypsin; Future    2. Dyspnea on exertion    3. Chronic respiratory failure with hypoxia  Comments:  Continue oxygen          Follow Up   No follow-ups on file.  Patient was given instructions and counseling regarding her condition or for health maintenance advice. Please see specific information pulled into the AVS if appropriate.       "

## 2025-01-10 ENCOUNTER — NURSING HOME (OUTPATIENT)
Dept: INTERNAL MEDICINE | Age: OVER 89
End: 2025-01-10
Payer: MEDICARE

## 2025-01-10 DIAGNOSIS — I71.43 INFRARENAL ABDOMINAL AORTIC ANEURYSM (AAA) WITHOUT RUPTURE: Primary | ICD-10-CM

## 2025-01-10 DIAGNOSIS — N18.31 STAGE 3A CHRONIC KIDNEY DISEASE: ICD-10-CM

## 2025-01-10 DIAGNOSIS — M79.605 PAIN IN BOTH LOWER EXTREMITIES: ICD-10-CM

## 2025-01-10 DIAGNOSIS — L03.115 CELLULITIS OF RIGHT LOWER EXTREMITY: ICD-10-CM

## 2025-01-10 DIAGNOSIS — E06.3 HYPOTHYROIDISM DUE TO HASHIMOTO'S THYROIDITIS: ICD-10-CM

## 2025-01-10 DIAGNOSIS — I10 HYPERTENSION, ESSENTIAL: ICD-10-CM

## 2025-01-10 DIAGNOSIS — R60.0 EDEMA OF BOTH LOWER LEGS DUE TO PERIPHERAL VENOUS INSUFFICIENCY: ICD-10-CM

## 2025-01-10 DIAGNOSIS — E78.2 MIXED HYPERLIPIDEMIA: ICD-10-CM

## 2025-01-10 DIAGNOSIS — J45.20 MILD INTERMITTENT ASTHMA, UNSPECIFIED WHETHER COMPLICATED: ICD-10-CM

## 2025-01-10 DIAGNOSIS — I73.9 ARTERIAL INSUFFICIENCY OF LOWER EXTREMITY: ICD-10-CM

## 2025-01-10 DIAGNOSIS — E11.65 TYPE 2 DIABETES MELLITUS WITH HYPERGLYCEMIA, WITHOUT LONG-TERM CURRENT USE OF INSULIN: ICD-10-CM

## 2025-01-10 DIAGNOSIS — R53.1 GENERALIZED WEAKNESS: ICD-10-CM

## 2025-01-10 DIAGNOSIS — I87.2 EDEMA OF BOTH LOWER LEGS DUE TO PERIPHERAL VENOUS INSUFFICIENCY: ICD-10-CM

## 2025-01-10 DIAGNOSIS — M79.604 PAIN IN BOTH LOWER EXTREMITIES: ICD-10-CM

## 2025-01-10 PROCEDURE — 99309 SBSQ NF CARE MODERATE MDM 30: CPT | Performed by: INTERNAL MEDICINE

## 2025-01-10 NOTE — PROGRESS NOTES
Nursing Home Follow-Up Note      Chandrakant Holcomb MD  [x]  [address], [address]  [place] [address]  Phone: [phone]  Fax: [phone]     PATIENT NAME: Jayashree Longoria                                                                          YOB: 1929            DATE OF SERVICE: 01/10/2025  FACILITY:   [] [facility]   [x] [facility]  [] [facility]  [] Other      HISTORY OF PRESENT ILLNESS: Patient was seen for routine rounds she is in no distress she is pleasant she was asleep during most of my exam but she woke's up, she tells me that her feet still hurt her at times, she had the left leg hanging off the bed she recently went out to another doctor for asthma and pulmonology follow-up,, there was really no change in medications or orders written other than to continue oxygen,      PAST MEDICAL & SURGICAL HISTORY:   Past Medical History:   Diagnosis Date    Diabetes mellitus     Disease of thyroid gland     Hyperlipidemia     Hypertension     Renal disorder    Asthma COPD  Chronic lower extremity venous insufficiency changes  PAD  Left ankle ulcer,  No past surgical history on file.       MEDICATIONS:  I have reviewed and reconciled the patients medication list in the patients chart at the skilled nursing facility today.        PHYSICAL EXAMINATION:   VITAL SIGNS: Pulse of 85 respiratory rate 18 sat 96% on 2 L of oxygen blood pressure is 138 over 72, temperature 98.0    PHYSICAL EXAM: Her neck is supple skin is very dry throughout, she opens her eyes she talks to me fairly well her abdomen is slightly obese soft nontender she is got chronic edema swelling of the right arm compared to the left with previous mastectomy on the right, she moves her arms to command cardiac exam reveals a regular rhythm distant heart tones 2/6 systolic murmur lungs show bilateral expiratory wheezes anteriorly and laterally lower  extremities show marked redness around the lower mid PreTAB regions down to the tops of the ankles, she is got redness to the toes on the right with a small scabbed area in between the third and fourth toe on the top, the feet are warm, and she can move her legs, the skin is tight around her feet and ankles and lower legs      RECORDS REVIEW:   Orders Reviewed.  Labs Reviewed.      ICD-10-CM ICD-9-CM   1. Infrarenal abdominal aortic aneurysm (AAA) without rupture  I71.43 441.4   2. Mixed hyperlipidemia  E78.2 272.2   3. Hypertension, essential  I10 401.9   4. Arterial insufficiency of lower extremity  I73.9 443.9   5. Hypothyroidism due to Hashimoto's thyroiditis  E06.3 245.2   6. Pain in both lower extremities  M79.604 729.5    M79.605    7. Generalized weakness  R53.1 780.79   8. Mild intermittent asthma, unspecified whether complicated  J45.20 493.90   9. Type 2 diabetes mellitus with hyperglycemia, without long-term current use of insulin  E11.65 250.00     790.29   10. Edema of both lower legs due to peripheral venous insufficiency  I87.2 459.81    R60.0 782.3   11. Cellulitis of right lower extremity  L03.115 682.6   12. Stage 3a chronic kidney disease  N18.31 585.3       ASSESSMENT/PLAN    Diagnoses and all orders for this visit:    1. Infrarenal abdominal aortic aneurysm (AAA) without rupture (Primary)    2. Mixed hyperlipidemia    3. Hypertension, essential    4. Arterial insufficiency of lower extremity    5. Hypothyroidism due to Hashimoto's thyroiditis    6. Pain in both lower extremities    7. Generalized weakness    8. Mild intermittent asthma, unspecified whether complicated    9. Type 2 diabetes mellitus with hyperglycemia, without long-term current use of insulin    10. Edema of both lower legs due to peripheral venous insufficiency    11. Cellulitis of right lower extremity    12. Stage 3a chronic kidney disease        Fall at home,, continues long-term care,,     Chronic lower extremity edema  /redness, chronic issue, chronic stasis dermatitis,,, continues Lasix 20 mg twice a day , potassium 20 mEq daily, triamcinolone cream,,, aspirin 81 mg daily, continues topical creams, has refused Ace wrap's in the past    Left ankle ulcer continue topical treatment pressure relief measures    Arterial insufficiency of the lower legs,, continues aspirin, Pletal 50 mg daily,, simvastatin 20 mg daily    Generalized weakness,,  up and down with assistance, stable    asthma, continues DuoNebs 4 times a day, Symbicort 2 puffs twice a day, Mucinex 600 twice a day, Claritin 10 mg daily    Hearing loss --profound    Anemia,,, will follow labs,    Hypothyroidism, new diagnosis found TSH 39, lab work August 28, 2023, continues levothyroxine, 0.137 mg daily, follow labs    Allergies, continues  Claritin 10 mg daily    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, continues on nystatin cream and powder as needed    7.6 cm infrarenal abdominal aortic aneurysm nonoperable candidate, continue metoprolol extended release 50 mg daily, not helping with lower extremity issues    Type 2 diabetes, off metformin monitoring clinically    Vitamin D deficiency continues replacement 1000 units daily    chronic kidney disease, off sodium bicarb ,, continue following labs    Hyperlipidemia continues  simvastatin 20 mg daily    Volume issues ---------BR NP levels are low,  continue Lasix 20 mg twice a day potassium 20 mEq daily,,   Chandrakant Holcomb MD

## 2025-02-10 ENCOUNTER — NURSING HOME (OUTPATIENT)
Dept: INTERNAL MEDICINE | Age: OVER 89
End: 2025-02-10
Payer: MEDICARE

## 2025-02-10 DIAGNOSIS — L97.329 LOWER LIMB ULCER, ANKLE, LEFT, WITH UNSPECIFIED SEVERITY: ICD-10-CM

## 2025-02-10 DIAGNOSIS — I71.43 INFRARENAL ABDOMINAL AORTIC ANEURYSM (AAA) WITHOUT RUPTURE: ICD-10-CM

## 2025-02-10 DIAGNOSIS — M79.605 PAIN IN BOTH LOWER EXTREMITIES: Primary | ICD-10-CM

## 2025-02-10 DIAGNOSIS — I10 HYPERTENSION, ESSENTIAL: ICD-10-CM

## 2025-02-10 DIAGNOSIS — E11.65 TYPE 2 DIABETES MELLITUS WITH HYPERGLYCEMIA, WITHOUT LONG-TERM CURRENT USE OF INSULIN: ICD-10-CM

## 2025-02-10 DIAGNOSIS — J45.20 MILD INTERMITTENT ASTHMA, UNSPECIFIED WHETHER COMPLICATED: ICD-10-CM

## 2025-02-10 DIAGNOSIS — R60.0 EDEMA OF BOTH LOWER LEGS DUE TO PERIPHERAL VENOUS INSUFFICIENCY: ICD-10-CM

## 2025-02-10 DIAGNOSIS — E06.3 HYPOTHYROIDISM DUE TO HASHIMOTO'S THYROIDITIS: ICD-10-CM
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
## 2025-02-10 DIAGNOSIS — I87.2 EDEMA OF BOTH LOWER LEGS DUE TO PERIPHERAL VENOUS INSUFFICIENCY: ICD-10-CM

## 2025-02-10 DIAGNOSIS — N18.31 STAGE 3A CHRONIC KIDNEY DISEASE: ICD-10-CM

## 2025-02-10 DIAGNOSIS — M79.604 PAIN IN BOTH LOWER EXTREMITIES: Primary | ICD-10-CM

## 2025-02-10 DIAGNOSIS — R53.1 GENERALIZED WEAKNESS: ICD-10-CM

## 2025-02-10 DIAGNOSIS — E78.2 MIXED HYPERLIPIDEMIA: ICD-10-CM

## 2025-02-10 PROCEDURE — 99309 SBSQ NF CARE MODERATE MDM 30: CPT | Performed by: INTERNAL MEDICINE

## 2025-02-10 NOTE — PROGRESS NOTES
Nursing Home Follow-Up Note      Chandrakant Holcomb MD  [x]  [address], [address]  [place] [address]  Phone: [phone]  Fax: [phone]     PATIENT NAME: Jayashree Longoria                                                                          YOB: 1929            DATE OF SERVICE: 02/10/2025  FACILITY:   [] [facility]   [x] [facility]  [] [facility]  [] Other      HISTORY OF PRESENT ILLNESS: Patient is being seen for routine rounds today she is in good spirits no real complaints when I walked in the room her left leg is externally rotated and the left ankle and foot are laying on the bed sideways which is a chronic position for it thus the reason for the chronic left lateral malleolus ankle ulcer, discussed with nursing staff about putting a foam pad around the ankle area if possible to lift the ankle and foot off the edge of the bed if possible, she is pleasant she is in no distress she is confused she is sleeping lying flat when I walked in but arouses easily she is hard of hearing      PAST MEDICAL & SURGICAL HISTORY:   Past Medical History:   Diagnosis Date    Diabetes mellitus     Disease of thyroid gland     Hyperlipidemia     Hypertension     Renal disorder    Hearing loss    Chronic lower extremity venous insufficiency with chronic stasis dermatitis    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       MEDICATIONS:  I have reviewed and reconciled the patients medication list in the patients chart at the skilled nursing facility today.        PHYSICAL EXAMINATION:   VITAL SIGNS: Respiratory rate 18 pulse 78 sat 93% to 94% on room air 140/76,    PHYSICAL EXAM: Her neck is supple with fatty tissue skin is dry throughout she is awake alert she is confused at baseline she follows simple commands her right arm is more enlarged than the left chronically,  nonpitting edema abdomen is obese soft nontender lungs are clear laterally and anteriorly with diminished breath sounds cardiac exam regular rhythm 1/6 systolic murmur her lower legs show chronic stasis changes with marked redness left greater than right from the mid to proximal PreTAB region to just above the ankle she has a small dried scabbed area on the left lateral ankle, the feet themselves are warm to touch, and she can move her legs to command      RECORDS REVIEW:   Orders Reviewed.  Labs Reviewed.      ICD-10-CM ICD-9-CM   1. Pain in both lower extremities  M79.604 729.5    M79.605    2. Lower limb ulcer, ankle, left, with unspecified severity  L97.329 707.13   3. Stage 3a chronic kidney disease  N18.31 585.3   4. Infrarenal abdominal aortic aneurysm (AAA) without rupture  I71.43 441.4   5. Mild intermittent asthma, unspecified whether complicated  J45.20 493.90   6. Fall, sequela  W19.XXXS 909.4     E929.3   7. Type 2 diabetes mellitus with hyperglycemia, without long-term current use of insulin  E11.65 250.00     790.29   8. Hypertension, essential  I10 401.9   9. Hypothyroidism due to Hashimoto's thyroiditis  E06.3 245.2   10. Mixed hyperlipidemia  E78.2 272.2   11. Edema of both lower legs due to peripheral venous insufficiency  I87.2 459.81    R60.0 782.3   12. Generalized weakness  R53.1 780.79       ASSESSMENT/PLAN    Diagnoses and all orders for this visit:    1. Pain in both lower extremities (Primary)    2. Lower limb ulcer, ankle, left, with unspecified severity    3. Stage 3a chronic kidney disease    4. Infrarenal abdominal aortic aneurysm (AAA) without rupture    5. Mild intermittent asthma, unspecified whether complicated    6. Fall, sequela    7. Type 2 diabetes mellitus with hyperglycemia, without long-term current use of insulin    8. Hypertension, essential    9. Hypothyroidism due to Hashimoto's thyroiditis    10. Mixed hyperlipidemia    11. Edema of both lower legs due to peripheral  venous insufficiency    12. Generalized weakness        Fall at home,, continues long-term care,,     Chronic lower extremity edema /redness, chronic issue, chronic stasis dermatitis,,, continues Lasix 20 mg twice a day , potassium 20 mEq daily, triamcinolone cream,,, aspirin 81 mg daily, continues topical creams, has refused Ace wrap's in the past    Left ankle ulcer continue topical treatment --pressure relief measures discussed with nursing again February 10, 2025    Arterial insufficiency of the lower legs,, continues aspirin, Pletal 50 mg daily,, simvastatin 20 mg daily    Generalized weakness,,  up and down with assistance, stable    asthma, continues DuoNebs 4 times a day, Symbicort 2 puffs twice a day, Mucinex 600 twice a day, Claritin 10 mg daily    Hearing loss --profound    Anemia,,, will follow labs,    Hypothyroidism, new diagnosis found TSH 39, lab work August 28, 2023, continues levothyroxine, 0.137 mg daily, follow labs    Allergies, continues  Claritin 10 mg daily    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

## 2025-03-11 ENCOUNTER — NURSING HOME (OUTPATIENT)
Dept: INTERNAL MEDICINE | Age: OVER 89
End: 2025-03-11
Payer: MEDICARE

## 2025-03-11 DIAGNOSIS — I71.43 INFRARENAL ABDOMINAL AORTIC ANEURYSM (AAA) WITHOUT RUPTURE: Primary | ICD-10-CM

## 2025-03-11 DIAGNOSIS — R60.0 EDEMA OF BOTH LOWER LEGS DUE TO PERIPHERAL VENOUS INSUFFICIENCY: ICD-10-CM

## 2025-03-11 DIAGNOSIS — L97.329 LOWER LIMB ULCER, ANKLE, LEFT, WITH UNSPECIFIED SEVERITY: ICD-10-CM

## 2025-03-11 DIAGNOSIS — N18.31 STAGE 3A CHRONIC KIDNEY DISEASE: ICD-10-CM

## 2025-03-11 DIAGNOSIS — I87.2 EDEMA OF BOTH LOWER LEGS DUE TO PERIPHERAL VENOUS INSUFFICIENCY: ICD-10-CM

## 2025-03-11 DIAGNOSIS — E78.2 MIXED HYPERLIPIDEMIA: ICD-10-CM

## 2025-03-11 DIAGNOSIS — M79.605 PAIN IN BOTH LOWER EXTREMITIES: ICD-10-CM

## 2025-03-11 DIAGNOSIS — M79.604 PAIN IN BOTH LOWER EXTREMITIES: ICD-10-CM

## 2025-03-11 DIAGNOSIS — E06.3 HYPOTHYROIDISM DUE TO HASHIMOTO'S THYROIDITIS: ICD-10-CM

## 2025-03-11 DIAGNOSIS — R53.1 GENERALIZED WEAKNESS: ICD-10-CM

## 2025-03-11 DIAGNOSIS — I10 HYPERTENSION, ESSENTIAL: ICD-10-CM

## 2025-03-11 DIAGNOSIS — L03.115 CELLULITIS OF RIGHT LOWER EXTREMITY: ICD-10-CM

## 2025-03-11 DIAGNOSIS — I73.9 ARTERIAL INSUFFICIENCY OF LOWER EXTREMITY: ICD-10-CM

## 2025-03-11 PROCEDURE — 99309 SBSQ NF CARE MODERATE MDM 30: CPT | Performed by: INTERNAL MEDICINE

## 2025-03-11 NOTE — PROGRESS NOTES
Nursing Home Follow-Up Note      Chandrakant Holcomb MD  [x]  [address]  [place] [address]  Phone: [phone]  Fax: [phone]     PATIENT NAME: Jayashree Longoria                                                                          YOB: 1929            DATE OF SERVICE: 03/11/2025  FACILITY:   [] [facility]   [x] [facility]  [] [facility]  [] Other      HISTORY OF PRESENT ILLNESS: Patient was seen today when I walked in the room she was sound asleep the TV was blaring at full volume and you could hear all the way down the hallway and she does have a roommate, so I turned the TV off and she got upset when she woke up, she is in no distress otherwise she is pleasant she says she stays in bed most of the time      PAST MEDICAL & SURGICAL HISTORY:   Past Medical History:   Diagnosis Date    Diabetes mellitus     Disease of thyroid gland     Hyperlipidemia     Hypertension     Renal disorder       No past surgical history on file.       MEDICATIONS:  I have reviewed and reconciled the patients medication list in the patients chart at the skilled nursing facility today.        PHYSICAL EXAMINATION:   VITAL SIGNS: Blood sugar 112, 104, blood pressure 136/74, weight is 204.9 pounds pulse 78 respiratory rate 18, sat 94% on room air    PHYSICAL EXAM: Her neck is supple with some fatty tissue her abdomen slightly obese soft nontender cardiac exam reveals a regular rhythm with a 2/6 to 3/6 systolic murmur heard at the entire precordium, her lungs are clear laterally and anteriorly with diminished breath sounds a few crackles at the right base her lower legs showed redness around the lower PreTAB regions bilateral left greater than right she has a bandage over the left lateral lower leg that is intact, she can move her feet to command she has somewhat tight skin throughout the lower legs from the knees to the  feet bilaterally,      RECORDS REVIEW:   Orders Reviewed.  Labs Reviewed.      ICD-10-CM ICD-9-CM   1. Infrarenal abdominal aortic aneurysm (AAA) without rupture  I71.43 441.4   2. Generalized weakness  R53.1 780.79   3. Cellulitis of right lower extremity  L03.115 682.6   4. Arterial insufficiency of lower extremity  I73.9 443.9   5. Hypothyroidism due to Hashimoto's thyroiditis  E06.3 245.2   6. Hypertension, essential  I10 401.9   7. Stage 3a chronic kidney disease  N18.31 585.3   8. Pain in both lower extremities  M79.604 729.5    M79.605    9. Lower limb ulcer, ankle, left, with unspecified severity  L97.329 707.13   10. Edema of both lower legs due to peripheral venous insufficiency  I87.2 459.81    R60.0 782.3   11. Mixed hyperlipidemia  E78.2 272.2       ASSESSMENT/PLAN    Diagnoses and all orders for this visit:    1. Infrarenal abdominal aortic aneurysm (AAA) without rupture (Primary)    2. Generalized weakness    3. Cellulitis of right lower extremity    4. Arterial insufficiency of lower extremity    5. Hypothyroidism due to Hashimoto's thyroiditis    6. Hypertension, essential    7. Stage 3a chronic kidney disease    8. Pain in both lower extremities    9. Lower limb ulcer, ankle, left, with unspecified severity    10. Edema of both lower legs due to peripheral venous insufficiency    11. Mixed hyperlipidemia        Fall at home,, continues long-term care,, labs reviewed March 11, 2025    Chronic lower extremity edema /redness, chronic issue, chronic stasis dermatitis,,, continues Lasix 20 mg twice a day , potassium 20 mEq daily, triamcinolone cream,,, aspirin 81 mg daily, continues topical creams, has refused Ace wrap's in the past    Left ankle ulcer continue topical treatment --pressure relief measures discussed with nursing again February 10, 2025, now with a small area ulcer on the left lateral lower leg being treated with topical medications bandage intact March 11, 2025    Arterial insufficiency  of the lower legs,, continues aspirin, Pletal 50 mg daily,, simvastatin 20 mg daily    Generalized weakness,,  up and down with assistance,     asthma, continues Brovana nebs twice a day, Atrovent inhaler 2 puffs 4 times a day, DuoNebs 4 times a day, as needed, Mucinex 600 twice a day, Claritin 10 mg daily    Hearing loss --profound    Anemia,,, will follow labs,    Hypothyroidism, new diagnosis found TSH 39, lab work August 28, 2023, continues levothyroxine, 0.137 mg daily, follow labs    Allergies, continues  Claritin 10 mg daily    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, continues on nystatin cream and powder as needed    7.6 cm infrarenal abdominal aortic aneurysm nonoperable candidate, continue metoprolol extended release 50 mg daily, not helping with lower extremity issues    Type 2 diabetes, off metformin monitoring clinically    Vitamin D deficiency continues replacement 1000 units daily    chronic kidney disease,, improved, off sodium bicarb ,, continue following labs, stable creatinine 1.7 December 2024    Hyperlipidemia continues  simvastatin 20 mg daily    Volume issues ---------BR NP levels are low,  continue Lasix 20 mg twice a day potassium 20 mEq daily,,     Chandrakant Holcomb MD

## 2025-04-09 ENCOUNTER — OFFICE VISIT (OUTPATIENT)
Age: OVER 89
End: 2025-04-09
Payer: MEDICARE

## 2025-04-09 VITALS
DIASTOLIC BLOOD PRESSURE: 56 MMHG | SYSTOLIC BLOOD PRESSURE: 124 MMHG | HEART RATE: 77 BPM | HEIGHT: 65 IN | OXYGEN SATURATION: 98 % | RESPIRATION RATE: 18 BRPM | TEMPERATURE: 97.1 F | BODY MASS INDEX: 34.99 KG/M2 | WEIGHT: 210 LBS

## 2025-04-09 DIAGNOSIS — I73.9 ARTERIAL INSUFFICIENCY OF LOWER EXTREMITY: ICD-10-CM

## 2025-04-09 DIAGNOSIS — I71.43 INFRARENAL ABDOMINAL AORTIC ANEURYSM (AAA) WITHOUT RUPTURE: Primary | ICD-10-CM

## 2025-04-09 DIAGNOSIS — L97.321 SKIN ULCER OF LEFT ANKLE, LIMITED TO BREAKDOWN OF SKIN: ICD-10-CM

## 2025-04-09 DIAGNOSIS — I73.9 PVD (PERIPHERAL VASCULAR DISEASE): ICD-10-CM

## 2025-04-09 NOTE — PROGRESS NOTES
[facility] Vascular Surgery New Patient Office Note     Date of Encounter: 04/09/2025     MRN Number: [id number]  Name: Jayashree Longoria  Phone Number: [phone]     Referred By: Arcelia Gallo APRN  PCP: Chandrakant Holcomb MD    Chief Complaint:    Chief Complaint   Patient presents with    Ulcer     Patient is a 89 year old female that presents to the clinic from her long term care facility with a concern of arterial ulcers to bilateral feet. Patient is accompanied by staff and she is very hard of hearing. Patient resides at [facility].        Subjective      History of Present Illness:    Jayashree Longoria is a 89 y.o. female presents as a referral from SAUL Degroot for arterial and venous concerns.  She is accompanied by a member of the staff from [facility] which she is a resident.  She is in a wheelchair, staff member states the patient does not walk. She does not elevate her legs much during the day likes to sit in her chair. Patient states her feet do not hurt or wake her at night. Staff member explains that she is never complains much even if something is bothering her. Toes on the right foot are discolored, she has doppler detected pedal pulses. Left foot has a healing wound lateral ankle, palpable pedal pulses. Both lower extremities with 1+ pitting edema and with erythema from mid calf to ankle, no drainage. She is able to move her feet on command, right toes are more limited than the left.  She was seen by our surgeons in 2023 during a hospital admission and was found to have a 7.6 cm abdominal aortic aneurysm, no intervention was recommended, they did not feel she would benefit from a procedure given her advanced age and quality of life at that time.She is currently being medically managed with Pletal, aspirin and a statin medication.     Review of Systems:  ROS  Review of Systems   Constitutional: Negative.   HENT: Negative.    Cardiovascular:  Negative.    Respiratory: Negative.    Skin: Negative.    Musculoskeletal: Negative.    Gastrointestinal: Negative.    Neurological: Negative.    Psychiatric/Behavioral: Negative.     I have reviewed the following portions of the patient's history: problem list, current medications, allergies, past surgical history, past medical history, past social history, past family history, and ROS and confirm it's accurate.    Allergies:  No Known Allergies    Medications:      Current Outpatient Medications:     acetaminophen (TYLENOL) 650 MG 8 hr tablet, Take 1 tablet by mouth Every 8 (Eight) Hours As Needed for Mild Pain., Disp: , Rfl:     ascorbic acid (VITAMIN C) 1000 MG tablet, Take 1 tablet by mouth Daily., Disp: , Rfl:     aspirin 81 MG EC tablet, Take 1 tablet by mouth Daily., Disp: , Rfl:     budesonide-formoterol (SYMBICORT) 160-4.5 MCG/ACT inhaler, Inhale 2 puffs 2 (Two) Times a Day., Disp: , Rfl:     cilostazol (PLETAL) 50 MG tablet, Take 1 tablet by mouth 2 (Two) Times a Day., Disp: , Rfl:     furosemide (LASIX) 40 MG tablet, Take 1 tablet by mouth Daily., Disp: , Rfl:     guaiFENesin (MUCINEX) 600 MG 12 hr tablet, Take 2 tablets by mouth 2 (Two) Times a Day., Disp: , Rfl:     ipratropium-albuterol (DUO-NEB) 0.5-2.5 mg/3 ml nebulizer, Take 3 mL by nebulization Every 4 (Four) Hours As Needed for Wheezing., Disp: , Rfl:     loratadine (CLARITIN) 10 MG tablet, Take 1 tablet by mouth Daily., Disp: , Rfl:     metFORMIN (GLUCOPHAGE) 500 MG tablet, Take 1 tablet by mouth 2 (Two) Times a Day With Meals., Disp: , Rfl:     metoprolol tartrate (LOPRESSOR) 50 MG tablet, Take 1 tablet by mouth 2 (Two) Times a Day., Disp: , Rfl:     nystatin (MYCOSTATIN) 227160 UNIT/GM powder, Apply  topically to the appropriate area as directed 4 (Four) Times a Day., Disp: , Rfl:     potassium chloride (K-DUR,KLOR-CON) 20 MEQ CR tablet, Take 1 tablet by mouth Daily., Disp: , Rfl:     simvastatin (ZOCOR) 20 MG tablet, Take 1 tablet by mouth  "Every Night., Disp: , Rfl:     Synthroid 75 MCG tablet, Take 1 tablet by mouth Daily., Disp: , Rfl:     atenolol (TENORMIN) 50 MG tablet, Take 1 tablet by mouth Daily. (Patient not taking: Reported on 1/2/2025), Disp: , Rfl:     levothyroxine sodium (TIROSINT) 50 MCG capsule, Take 1 tablet by mouth Daily. (Patient not taking: Reported on 4/9/2025), Disp: , Rfl:     miconazole (MICOTIN) 2 % powder, Apply  topically to the appropriate area as directed Every 12 (Twelve) Hours. (Patient not taking: Reported on 4/9/2025), Disp: , Rfl:     montelukast (SINGULAIR) 10 MG tablet, Take 1 tablet by mouth Every Night. (Patient not taking: Reported on 1/2/2025), Disp: , Rfl:     potassium chloride ER (K-TAB) 20 MEQ tablet controlled-release ER tablet, Take 1 tablet by mouth Daily. (Patient not taking: Reported on 4/9/2025), Disp: , Rfl:     pravastatin (PRAVACHOL) 20 MG tablet, Take 1 tablet by mouth Daily. (Patient not taking: Reported on 4/9/2025), Disp: , Rfl:     sodium bicarbonate 650 MG tablet, Take 1 tablet by mouth 3 (Three) Times a Day. (Patient not taking: Reported on 4/9/2025), Disp: , Rfl:     History:   Past Medical History:   Diagnosis Date    Diabetes mellitus     Disease of thyroid gland     Hyperlipidemia     Hypertension     Renal disorder        History reviewed. No pertinent surgical history.    Social History     Socioeconomic History    Marital status:    Tobacco Use    Smoking status: Never    Smokeless tobacco: Never   Vaping Use    Vaping status: Never Used   Substance and Sexual Activity    Alcohol use: Never    Drug use: Never    Sexual activity: Not Currently        History reviewed. No pertinent family history.    Objective     Physical Exam:  Vitals:    04/09/25 0802   BP: 124/56   BP Location: Left arm   Patient Position: Sitting   Cuff Size: Large Adult   Pulse: 77   Resp: 18   Temp: 97.1 °F (36.2 °C)   TempSrc: Oral   SpO2: 98%   Weight: 95.3 kg (210 lb)   Height: 165.1 cm (65\")   PainSc: " 0-No pain      Body mass index is 34.95 kg/m².  BMI is >= 30 and <35. (Class 1 Obesity). The following options were offered after discussion;: information on healthy weight added to patient's after visit summary        Physical Exam   Physical Exam  Constitutional:       Appearance: Normal  HENT:      Head: Normocephalic and atraumatic.   Eyes:      Pupils: Pupils are equal, round, and reactive to light.   Cardiovascular:      Rate and Rhythm: Normal rate and regular rhythm.  Right lower extremity: Discoloration/purple toes, toes are cool to the touch, foot is warm, Doppler detected pedal pulses, erythema from mid calf to ankle, 1+ pitting edema, no open areas, no drainage.   Left lower extremity: Healing ulceration lateral ankle, minimal drainage, +2 palpable pedal pulses, +1 pitting edema, erythema from mid calf to ankle, no drainage.  Pulmonary:      Effort: Pulmonary effort is normal.     Musculoskeletal:      Cervical back: Normal range of motion and neck supple.   Skin:     General: Skin is warm and dry.   Neurological:      General: No focal deficit present.      Mental Status: Alert and oriented to person, place, and time.     Imaging/Labs:  I have reveiwed the two duplexes and a venous study performed at an outside facility. Two left lower extremity duplexes were performed one performed in September and one in October 2024.  The conclusion on both reports was no significant stenosis in the left lower extremity however she did have multiphasic waveforms.  No radiology results for the last 30 days.       Assessment / Plan      Assessment / Plan:  Diagnoses and all orders for this visit:    1. Infrarenal abdominal aortic aneurysm (AAA) without rupture (Primary)    2. Skin ulcer of left ankle, limited to breakdown of skin    3. Arterial insufficiency of lower extremity    4. PVD (peripheral vascular disease)     Ms. Longoria appears to have a combination of arterial and venous insufficiency.  Her feet are warm,  she has palpable pedal pulses on the left and Doppler detected pulses on the right. She is able to move her feet and toes on command. She denies any rest pain, although the staff member that accompanies her explains, she does not complain much even when something hurts. She has a small ulceration on the left foot that appears to be healing well.  I have reviewed her previous venous and arterial duplex that was performed in 2024 that concludes no significant stenosis. She also has a known 7.6 cm abdominal aortic aneurysm.  No vascular intervention is advised at this time, I recommend that we continue conservative medical management with pletal and aspirin, along with elevating or lightly wrapping the lower extremities with ace wrap to help control the swelling and prevent tissue breakdown.      She may follow-up as needed, I have explained should they have any additional concerns or worsening signs or symptoms that we will be glad to see her back in the office.    I have answered all of her questions and she is in agreement with the plan at this time.  Thank you for allowing me to participate in your patient's care.        Follow Up:   No follow-ups on file.   Or sooner for any further concerns or worsening sign and symptoms. If unable to reach us in the office please dial 911 or go to the nearest emergency department.      SAUL Booth  [facility] Vascular Surgery

## 2025-04-21 ENCOUNTER — NURSING HOME (OUTPATIENT)
Dept: INTERNAL MEDICINE | Age: OVER 89
End: 2025-04-21
Payer: MEDICARE

## 2025-04-21 DIAGNOSIS — I73.9 ARTERIAL INSUFFICIENCY OF LOWER EXTREMITY: ICD-10-CM

## 2025-04-21 DIAGNOSIS — R53.1 GENERALIZED WEAKNESS: Primary | ICD-10-CM

## 2025-04-21 DIAGNOSIS — E06.3 HYPOTHYROIDISM DUE TO HASHIMOTO'S THYROIDITIS: ICD-10-CM

## 2025-04-21 DIAGNOSIS — R60.0 LOCALIZED EDEMA: ICD-10-CM

## 2025-04-21 DIAGNOSIS — I10 HYPERTENSION, ESSENTIAL: ICD-10-CM

## 2025-04-21 DIAGNOSIS — E78.2 MIXED HYPERLIPIDEMIA: ICD-10-CM

## 2025-04-21 DIAGNOSIS — N18.31 STAGE 3A CHRONIC KIDNEY DISEASE: ICD-10-CM

## 2025-04-21 DIAGNOSIS — I87.2 EDEMA OF BOTH LOWER LEGS DUE TO PERIPHERAL VENOUS INSUFFICIENCY: ICD-10-CM

## 2025-04-21 DIAGNOSIS — J45.20 MILD INTERMITTENT ASTHMA, UNSPECIFIED WHETHER COMPLICATED: ICD-10-CM

## 2025-04-21 DIAGNOSIS — R60.0 EDEMA OF BOTH LOWER LEGS DUE TO PERIPHERAL VENOUS INSUFFICIENCY: ICD-10-CM

## 2025-04-21 DIAGNOSIS — I71.43 INFRARENAL ABDOMINAL AORTIC ANEURYSM (AAA) WITHOUT RUPTURE: ICD-10-CM

## 2025-04-21 PROCEDURE — 99309 SBSQ NF CARE MODERATE MDM 30: CPT | Performed by: INTERNAL MEDICINE

## 2025-05-23 ENCOUNTER — NURSING HOME (OUTPATIENT)
Dept: INTERNAL MEDICINE | Age: OVER 89
End: 2025-05-23
Payer: MEDICARE

## 2025-05-23 DIAGNOSIS — J45.20 MILD INTERMITTENT ASTHMA, UNSPECIFIED WHETHER COMPLICATED: ICD-10-CM

## 2025-05-23 DIAGNOSIS — E06.3 HYPOTHYROIDISM DUE TO HASHIMOTO'S THYROIDITIS: ICD-10-CM

## 2025-05-23 DIAGNOSIS — I87.2 EDEMA OF BOTH LOWER LEGS DUE TO PERIPHERAL VENOUS INSUFFICIENCY: ICD-10-CM

## 2025-05-23 DIAGNOSIS — K92.0 HEMATEMESIS, UNSPECIFIED WHETHER NAUSEA PRESENT: Primary | ICD-10-CM

## 2025-05-23 DIAGNOSIS — L97.321 SKIN ULCER OF LEFT ANKLE, LIMITED TO BREAKDOWN OF SKIN: ICD-10-CM

## 2025-05-23 DIAGNOSIS — M79.605 PAIN IN BOTH LOWER EXTREMITIES: ICD-10-CM

## 2025-05-23 DIAGNOSIS — E11.65 TYPE 2 DIABETES MELLITUS WITH HYPERGLYCEMIA, WITHOUT LONG-TERM CURRENT USE OF INSULIN: ICD-10-CM

## 2025-05-23 DIAGNOSIS — I73.9 ARTERIAL INSUFFICIENCY OF LOWER EXTREMITY: ICD-10-CM

## 2025-05-23 DIAGNOSIS — E78.2 MIXED HYPERLIPIDEMIA: ICD-10-CM

## 2025-05-23 DIAGNOSIS — N18.31 STAGE 3A CHRONIC KIDNEY DISEASE: ICD-10-CM

## 2025-05-23 DIAGNOSIS — I10 HYPERTENSION, ESSENTIAL: ICD-10-CM

## 2025-05-23 DIAGNOSIS — M79.604 PAIN IN BOTH LOWER EXTREMITIES: ICD-10-CM

## 2025-05-23 DIAGNOSIS — R60.0 EDEMA OF BOTH LOWER LEGS DUE TO PERIPHERAL VENOUS INSUFFICIENCY: ICD-10-CM

## 2025-05-23 DIAGNOSIS — I71.43 INFRARENAL ABDOMINAL AORTIC ANEURYSM (AAA) WITHOUT RUPTURE: ICD-10-CM

## 2025-05-23 DIAGNOSIS — R60.0 LOCALIZED EDEMA: ICD-10-CM

## 2025-05-23 NOTE — PROGRESS NOTES
Nursing Home Follow-Up Note      Chandrakant Holcomb MD  [x]  [address], [address]  [place] [address]  Phone: [phone]  Fax: [phone]     PATIENT NAME: Jayashree Longoria                                                                          YOB: 1929            DATE OF SERVICE: 05/23/2025  FACILITY:   [] [facility]   [x] [facility]  [] [facility]  [] Other      HISTORY OF PRESENT ILLNESS: Patient was going to be seen for routine rounds but I was informed that she had coughed up several episodes through the night of coffee-ground emesis but refused to go to the emergency room, I spoke with the nursing staff today they said it was very dark chocolatey colored, the patient is confused at baseline so she does not remember, they said that she had a blister on her left leg and they dressed it last night and she had pulled the dressing off, she says if it is too tight she does not like it, she is more worried about her hearing aid, she says she does not want to go to the hospital when I specifically asked her about the vomiting and the bleeding.      PAST MEDICAL & SURGICAL HISTORY:   Past Medical History:   Diagnosis Date    Diabetes mellitus     Disease of thyroid gland     Hyperlipidemia     Hypertension     Renal disorder       No past surgical history on file.       MEDICATIONS:  I have reviewed and reconciled the patients medication list in the patients chart at the skilled nursing facility today.        PHYSICAL EXAMINATION:   VITAL SIGNS: Sat 95% on room air pulse 108-110, last blood pressure 132/76,    PHYSICAL EXAM: On exam she is very hard of hearing her skin is very dry throughout she is got increased enlargement of the right arm compared to the left which is chronic she has 1-2+ edema in the lower legs with a large very round blistered area on the left lateral lower leg she also has a small dried ulcer  on the left lateral malleolus of the ankle, the dressing had been pulled partially off on the left blistered ulcer area on the left leg, her abdomen is obese soft nontender her lungs were clear laterally and anteriorly with crackles laterally at the bases, cardiac exam reveals a regular rhythm tachycardic, neck is supple she is talkative alert moving her arms to command she can move her feet to command      RECORDS REVIEW:   Orders Reviewed.  Labs Reviewed.      ICD-10-CM ICD-9-CM   1. Hematemesis, unspecified whether nausea present  K92.0 578.0   2. Localized edema  R60.0 782.3   3. Infrarenal abdominal aortic aneurysm (AAA) without rupture  I71.43 441.4   4. Mild intermittent asthma, unspecified whether complicated  J45.20 493.90   5. Type 2 diabetes mellitus with hyperglycemia, without long-term current use of insulin  E11.65 250.00     790.29   6. Hypertension, essential  I10 401.9   7. Hypothyroidism due to Hashimoto's thyroiditis  E06.3 245.2   8. Stage 3a chronic kidney disease  N18.31 585.3   9. Pain in both lower extremities  M79.604 729.5    M79.605    10. Skin ulcer of left ankle, limited to breakdown of skin  L97.321 707.13   11. Edema of both lower legs due to peripheral venous insufficiency  I87.2 459.81    R60.0 782.3   12. Mixed hyperlipidemia  E78.2 272.2   13. Arterial insufficiency of lower extremity  I73.9 443.9       ASSESSMENT/PLAN    Diagnoses and all orders for this visit:    1. Hematemesis, unspecified whether nausea present (Primary)    2. Localized edema    3. Infrarenal abdominal aortic aneurysm (AAA) without rupture    4. Mild intermittent asthma, unspecified whether complicated    5. Type 2 diabetes mellitus with hyperglycemia, without long-term current use of insulin    6. Hypertension, essential    7. Hypothyroidism due to Hashimoto's thyroiditis    8. Stage 3a chronic kidney disease    9. Pain in both lower extremities    10. Skin ulcer of left ankle, limited to breakdown of  skin    11. Edema of both lower legs due to peripheral venous insufficiency    12. Mixed hyperlipidemia    13. Arterial insufficiency of lower extremity    Hematemesis, discussed with patient son about going to the hospital if necessary, patient has been refusing so we will make her comfort measures but continue current treatment plan but no hospitalization will be implemented,    Fall at home,, continues long-term care,, labs reviewed March 11, 2025    Chronic lower extremity edema /redness, chronic issue, chronic stasis dermatitis,,, continues Lasix 20 mg twice a day , potassium 20 mEq daily, triamcinolone cream,,, aspirin 81 mg daily, continues topical creams, has refused Ace wrap's in the past    Large blistered ulcer on the left lateral lower leg, continue dressings treatment options, May 23, 2025 discussed with nursing staff, currently on doxycycline since May 13 no stop date will need to clarify with staff about this issue, May 23, 2025    Left ankle ulcer continue topical treatment --pressure relief measures discussed with nursing again February 10, 2025,     Arterial insufficiency of the lower legs,, continues aspirin, Pletal 50 mg daily,, simvastatin 20 mg daily    Generalized weakness,,  up and down with assistance,     asthma, continues Brovana nebs twice a day, Atrovent inhaler 2 puffs 4 times a day, DuoNebs 4 times a day, as needed, Mucinex 600 twice a day, Claritin 10 mg daily    Hearing loss --profound    Anemia,,, will follow labs,    Hypothyroidism, diagnosis found TSH 39, lab work August 28, 2023, continues levothyroxine, 0.137 mg daily, follow labs    Allergies, continues  Claritin 10 mg daily, due to polypharmacy would stop    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, continues on nystatin cream and powder as needed  Depression, started on Zoloft 50 mg daily May 1, 2025    7.6 cm infrarenal abdominal aortic aneurysm nonoperable candidate, continue metoprolol extended release 50 mg daily, not  helping with lower extremity issues    Type 2 diabetes, off metformin monitoring clinically    Vitamin D deficiency continues replacement 1000 units daily    chronic kidney disease,, improved, off sodium bicarb ,, continue following labs, stable creatinine 1.7 December 2024    Hyperlipidemia continues  simvastatin 20 mg daily consider stopping    Volume issues ---------BR NP levels are low,  continue Lasix 20 mg twice a day potassium 20 mEq daily,,   Chandrakant Holcomb MD